# Patient Record
Sex: MALE | Race: WHITE | NOT HISPANIC OR LATINO | Employment: OTHER | URBAN - METROPOLITAN AREA
[De-identification: names, ages, dates, MRNs, and addresses within clinical notes are randomized per-mention and may not be internally consistent; named-entity substitution may affect disease eponyms.]

---

## 2017-05-19 ENCOUNTER — THERAPY VISIT (OUTPATIENT)
Dept: PHYSICAL THERAPY | Facility: CLINIC | Age: 57
End: 2017-05-19
Payer: COMMERCIAL

## 2017-05-19 DIAGNOSIS — M75.102 ROTATOR CUFF TEAR ARTHROPATHY OF BOTH SHOULDERS: ICD-10-CM

## 2017-05-19 DIAGNOSIS — M75.101 ROTATOR CUFF TEAR ARTHROPATHY OF BOTH SHOULDERS: ICD-10-CM

## 2017-05-19 DIAGNOSIS — M12.811 ROTATOR CUFF TEAR ARTHROPATHY OF BOTH SHOULDERS: ICD-10-CM

## 2017-05-19 DIAGNOSIS — M12.812 ROTATOR CUFF TEAR ARTHROPATHY OF BOTH SHOULDERS: ICD-10-CM

## 2017-05-19 DIAGNOSIS — M25.511 BILATERAL SHOULDER PAIN, UNSPECIFIED CHRONICITY: Primary | ICD-10-CM

## 2017-05-19 DIAGNOSIS — M25.512 BILATERAL SHOULDER PAIN, UNSPECIFIED CHRONICITY: Primary | ICD-10-CM

## 2017-05-19 PROCEDURE — 97112 NEUROMUSCULAR REEDUCATION: CPT | Mod: GP | Performed by: PHYSICAL THERAPIST

## 2017-05-19 PROCEDURE — 97161 PT EVAL LOW COMPLEX 20 MIN: CPT | Mod: GP | Performed by: PHYSICAL THERAPIST

## 2017-05-19 NOTE — LETTER
[facility]  [address] [place] [address]  [phone]    May 22, 2017    Re: Yakov Kahn   :   1960  MRN:  [id number]   REFERRING PHYSICIAN:   Ricardo Poe    [facility]  Date of Initial Evaluation: 2107   Visits:  Rxs Used: 1  Reason for Referral:     Bilateral shoulder pain, unspecified chronicity  Rotator cuff tear arthropathy of both shoulders    EVALUATION SUMMARY  Subjective:  Patient is a 56-year-old male presenting with rehab left shoulder hpi.   Yakov Kahn is a 56 year old male with a bilateral shoulders (L>R) condition.  Condition occurred with: Unknown cause.  Condition occurred: for unknown reasons.  This is a new condition  Pain ongoing for years, tore L recently a few weeks ago, R problematic since .    Patient reports pain: In the joint, anterior and lateral (L painful R not).  Radiates to: Upper arm.  Pain is described as sharp and is intermittent and reported as 3/10 and 10/10.  Associated symptoms: Loss of motion/stiffness and edema.  Pain is worse in the PM.  Symptoms are exacerbated by using arm at shoulder level, using arm overhead, certain positions, lifting, carrying and using arm behind back and relieved by ice and NSAIDs.  Since onset symptoms are gradually improving.  Special tests: MRI.  Previous treatment includes other (massage, self prescribed exercsies).  There was significant improvement following previous treatment.  General health as reported by patient is good.  Pertinent medical history includes: None.  Medical allergies: yes.  Other surgeries include:  None reported.  Current medications: None as reported by the patient.  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  Patient is working in normal job without restrictions.  Primary job tasks include: Repetitive tasks, lifting, driving and operating a machine.  Barriers include: None as reported by the patient.  Red flags: None as  reported by the patient.    Objective:  Shoulder Evaluation:  AROM:    Flexion:  Left:  80    Right:  140  Abduction:  Left: 90   Right:  140  Internal Rotation:  Left:  60    Right:  70  External Rotation:  Left:  60    Right:  80  Strength:    Flexion: Left:4-/5   Pain:    Right: 4+/5     Pain:   Abduction:  Left: 4-/5  Pain:    Right: 4+/5     Pain:  Internal Rotation:  Left:4+/5     Pain:    Right: 4+/5     Pain:  External Rotation:   Left:4/5     Pain:   Right:4/5     Pain:    Elbow Flexion:  Left:5/5     Pain:    Right:5/5     Pain:  Elbow Extension:  Left:5/5     Pain:    Right:5/5     Pain:  Stability Testing:  normal  Special Tests:    Left shoulder positive for the following special tests:  Impingement and Rotator cuff tear  Right shoulder positive for the following special tests:Impingement and Rotator cuff tear            Re: Yakov Kahn   :   1960      Assessment/Plan:    Patient is a 56-year-old male with both sides shoulder complaints.    Patient has the following significant findings with corresponding treatment plan.                Diagnosis 1:  B RCT  Pain -  hot/cold therapy, manual therapy, education, directional preference exercise and home program  Decreased ROM/flexibility - manual therapy, therapeutic exercise and home program  Decreased strength - therapeutic exercise, therapeutic activities and home program  Impaired muscle performance - neuro re-education and home program  Decreased function - therapeutic activities and home program    Therapy Evaluation Codes:   1) History comprised of:   Personal factors that impact the plan of care:  Profession.    Comorbidity factors that impact the plan of care are:  Weakness.     Medications impacting care: Anti-inflammatory.  2) Examination of Body Systems comprised of:   Body structures and functions that impact the plan of care:  Shoulder.   Activity limitations that impact the plan of care are:     Bathing, Cooking, Driving, Dressing,  Lifting, Sports, Throwing, Working and Sleeping.  3) Clinical presentation characteristics are: Stable/Uncomplicated.  4) Decision-Making:  Low complexity using standardized patient assessment instrument and/or measureable assessment of functional outcome.  Cumulative Therapy Evaluation is: Low complexity.    Previous and current functional limitations: (See Goal Flow Sheet for this information)    Short term and Long term goals: (See Goal Flow Sheet for this information)   Communication ability: Patient appears to be able to clearly communicate and understand verbal and written communication and follow directions correctly.  Treatment Explanation - The following has been discussed with the patient: RX ordered/plan of care  Anticipated outcomes  Possible risks and side effects  This patient would benefit from PT intervention to resume normal activities.   Rehab potential is fair due to presence of B RCT confirmed on MRI.  Frequency:  1 X week, once daily  Duration:  for 4 weeks tapering to 2 X a month over 8 weeks  Discharge Plan:  Achieve all LTG.  Independent in home treatment program.  Reach maximal therapeutic benefit.         Thank you for your referral.    INQUIRIES  Therapist: Swathi Yang [facility]  [facility]  [address] [place] [address]  Phone: [phone]/Fax: [phone]

## 2017-05-19 NOTE — PROGRESS NOTES
Subjective:    Patient is a 56 year old male presenting with rehab left shoulder hpi.   Yakov Kahn is a 56 year old male with a bilateral shoulders (L>R) condition.  Condition occurred with:  Unknown cause.  Condition occurred: for unknown reasons.  This is a new condition  Pain ongoing for years, tore L recently a few weeks ago, R problematic since 2013.    Patient reports pain:  In the joint, anterior and lateral (L painful R not).  Radiates to:  Upper arm.  Pain is described as sharp and is intermittent and reported as 3/10 and 10/10.  Associated symptoms:  Loss of motion/stiffness and edema. Pain is worse in the P.M..  Symptoms are exacerbated by using arm at shoulder level, using arm overhead, certain positions, lifting, carrying and using arm behind back and relieved by ice and NSAID's.  Since onset symptoms are gradually improving.  Special tests:  MRI.  Previous treatment includes other (massage, self prescribed exercsies).  There was significant improvement following previous treatment.  General health as reported by patient is good.  Pertinent medical history includes:  None.  Medical allergies: yes.  Other surgeries include:  None reported.  Current medications:  None as reported by the patient.  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  Patient is working in normal job without restrictions.  Primary job tasks include:  Repetitive tasks, lifting, driving and operating a machine.    Barriers include:  None as reported by the patient.    Red flags:  None as reported by the patient.                        Objective:    System                   Shoulder Evaluation:  ROM:  AROM:    Flexion:  Left:  80    Right:  140    Abduction:  Left: 90   Right:  140    Internal Rotation:  Left:  60    Right:  70  External Rotation:  Left:  60    Right:  80                      Strength:    Flexion: Left:4-/5   Pain:    Right: 4+/5     Pain:     Abduction:  Left: 4-/5  Pain:    Right: 4+/5      Pain:    Internal Rotation:  Left:4+/5     Pain:    Right: 4+/5     Pain:  External Rotation:   Left:4/5     Pain:   Right:4/5     Pain:        Elbow Flexion:  Left:5/5     Pain:    Right:5/5     Pain:  Elbow Extension:  Left:5/5     Pain:    Right:5/5     Pain:  Stability Testing:  normal      Special Tests:    Left shoulder positive for the following special tests:  Impingement and Rotator cuff tear  Right shoulder positive for the following special tests:Impingement and Rotator cuff tear                                       General     ROS    Assessment/Plan:      Patient is a 56 year old male with both sides shoulder complaints.    Patient has the following significant findings with corresponding treatment plan.                Diagnosis 1:  B RCT  Pain -  hot/cold therapy, manual therapy, education, directional preference exercise and home program  Decreased ROM/flexibility - manual therapy, therapeutic exercise and home program  Decreased strength - therapeutic exercise, therapeutic activities and home program  Impaired muscle performance - neuro re-education and home program  Decreased function - therapeutic activities and home program    Therapy Evaluation Codes:   1) History comprised of:   Personal factors that impact the plan of care:      Profession.    Comorbidity factors that impact the plan of care are:      Weakness.     Medications impacting care: Anti-inflammatory.  2) Examination of Body Systems comprised of:   Body structures and functions that impact the plan of care:      Shoulder.   Activity limitations that impact the plan of care are:      Bathing, Cooking, Driving, Dressing, Lifting, Sports, Throwing, Working and Sleeping.  3) Clinical presentation characteristics are:   Stable/Uncomplicated.  4) Decision-Making    Low complexity using standardized patient assessment instrument and/or measureable assessment of functional outcome.  Cumulative Therapy Evaluation is: Low  complexity.    Previous and current functional limitations:  (See Goal Flow Sheet for this information)    Short term and Long term goals: (See Goal Flow Sheet for this information)     Communication ability:  Patient appears to be able to clearly communicate and understand verbal and written communication and follow directions correctly.  Treatment Explanation - The following has been discussed with the patient:   RX ordered/plan of care  Anticipated outcomes  Possible risks and side effects  This patient would benefit from PT intervention to resume normal activities.   Rehab potential is fair due to presence of B RCT confirmed on MRI.    Frequency:  1 X week, once daily  Duration:  for 4 weeks tapering to 2 X a month over 8 weeks  Discharge Plan:  Achieve all LTG.  Independent in home treatment program.  Reach maximal therapeutic benefit.    Please refer to the daily flowsheet for treatment today, total treatment time and time spent performing 1:1 timed codes.

## 2017-05-19 NOTE — MR AVS SNAPSHOT
After Visit Summary   5/19/2017    Yakov Kahn    MRN: [id number]           Patient Information     Date Of Birth          1960        Visit Information        Provider Department      5/19/2017 9:00 AM Swathi Yang, PT [facility]        Today's Diagnoses     Bilateral shoulder pain, unspecified chronicity    -  1    Rotator cuff tear arthropathy of both shoulders           Follow-ups after your visit        Your next 10 appointments already scheduled     May 25, 2017  8:00 AM CDT   LINDA Extremity with Swathi Yang PT   [facility] (LINDA [place])    [address]  [place] [address]   [phone]            Jun 02, 2017  7:00 AM CDT   LINDA Extremity with Swathi Yang PT   [facility] ([facility] [place])    [address]  [place] [address]   [phone]            Jun 09, 2017  7:00 AM CDT   LINDA Extremity with Swathi Yang PT   [facility] ([facility] [place])    [address]  [place] [address]   [phone]              Who to contact     If you have questions or need follow up information about today's clinic visit or your schedule please contact [facility] directly at [phone].  Normal or non-critical lab and imaging results will be communicated to you by MyChart, letter or phone within 4 business days after the clinic has received the results. If you do not hear from us within 7 days, please contact the clinic through MyChart or phone. If you have a critical or abnormal lab result, we will notify you by phone as soon as possible.  Submit refill requests through [email or web address] or call your pharmacy and they will forward the refill request to us. Please allow 3 business days for your refill to be completed.          Additional Information About Your Visit        Care EveryWhere ID     This is your Care  EveryWhere ID. This could be used by other organizations to access your [facility] medical records  [id number]         Blood Pressure from Last 3 Encounters:   04/07/15 140/82   09/17/04 140/78    Weight from Last 3 Encounters:   04/07/15 95.7 kg (210 lb 14.4 oz)   09/17/04 87.5 kg (193 lb)              We Performed the Following     HC PT EVAL, LOW COMPLEXITY     LINDA INITIAL EVAL REPORT     NEUROMUSCULAR RE-EDUCATION        Primary Care Provider Office Phone # Fax #    Humberto Vora PA-C [phone]       [facility] [address]  [facility] [address]        Thank you!     Thank you for choosing [facility]  for your care. Our goal is always to provide you with excellent care. Hearing back from our patients is one way we can continue to improve our services. Please take a few minutes to complete the written survey that you may receive in the mail after your visit with us. Thank you!             Your Updated Medication List - Protect others around you: Learn how to safely use, store and throw away your medicines at www.disposemymeds.org.          This list is accurate as of: 5/19/17  2:34 PM.  Always use your most recent med list.                   Brand Name Dispense Instructions for use    CALCIUM-D PO          NAPROSYN 500 MG tablet   Generic drug:  naproxen     60    1 TABLET TWICE DAILY       triamcinolone 0.1 % lotion    KENALOG    60 mL    Apply sparingly to affected area three times daily as needed.       vitamin D 1000 UNITS capsule      Take 2 capsules by mouth daily       ZYRTEC ALLERGY 10 MG Caps   Generic drug:  cetirizine HCl      Take by mouth daily

## 2017-05-25 ENCOUNTER — THERAPY VISIT (OUTPATIENT)
Dept: PHYSICAL THERAPY | Facility: CLINIC | Age: 57
End: 2017-05-25
Payer: COMMERCIAL

## 2017-05-25 DIAGNOSIS — M25.511 BILATERAL SHOULDER PAIN, UNSPECIFIED CHRONICITY: ICD-10-CM

## 2017-05-25 DIAGNOSIS — M25.512 BILATERAL SHOULDER PAIN, UNSPECIFIED CHRONICITY: ICD-10-CM

## 2017-05-25 DIAGNOSIS — M12.811 ROTATOR CUFF TEAR ARTHROPATHY OF BOTH SHOULDERS: ICD-10-CM

## 2017-05-25 DIAGNOSIS — M12.812 ROTATOR CUFF TEAR ARTHROPATHY OF BOTH SHOULDERS: ICD-10-CM

## 2017-05-25 DIAGNOSIS — M75.102 ROTATOR CUFF TEAR ARTHROPATHY OF BOTH SHOULDERS: ICD-10-CM

## 2017-05-25 DIAGNOSIS — M75.101 ROTATOR CUFF TEAR ARTHROPATHY OF BOTH SHOULDERS: ICD-10-CM

## 2017-05-25 PROCEDURE — 97110 THERAPEUTIC EXERCISES: CPT | Mod: GP | Performed by: PHYSICAL THERAPIST

## 2017-05-25 PROCEDURE — 97112 NEUROMUSCULAR REEDUCATION: CPT | Mod: GP | Performed by: PHYSICAL THERAPIST

## 2017-06-02 ENCOUNTER — THERAPY VISIT (OUTPATIENT)
Dept: PHYSICAL THERAPY | Facility: CLINIC | Age: 57
End: 2017-06-02
Payer: COMMERCIAL

## 2017-06-02 DIAGNOSIS — M12.812 ROTATOR CUFF TEAR ARTHROPATHY OF BOTH SHOULDERS: ICD-10-CM

## 2017-06-02 DIAGNOSIS — M12.811 ROTATOR CUFF TEAR ARTHROPATHY OF BOTH SHOULDERS: ICD-10-CM

## 2017-06-02 DIAGNOSIS — M75.102 ROTATOR CUFF TEAR ARTHROPATHY OF BOTH SHOULDERS: ICD-10-CM

## 2017-06-02 DIAGNOSIS — M25.512 BILATERAL SHOULDER PAIN, UNSPECIFIED CHRONICITY: ICD-10-CM

## 2017-06-02 DIAGNOSIS — M25.511 BILATERAL SHOULDER PAIN, UNSPECIFIED CHRONICITY: ICD-10-CM

## 2017-06-02 DIAGNOSIS — M75.101 ROTATOR CUFF TEAR ARTHROPATHY OF BOTH SHOULDERS: ICD-10-CM

## 2017-06-02 PROCEDURE — 97110 THERAPEUTIC EXERCISES: CPT | Mod: GP | Performed by: PHYSICAL THERAPIST

## 2017-06-02 PROCEDURE — 97112 NEUROMUSCULAR REEDUCATION: CPT | Mod: GP | Performed by: PHYSICAL THERAPIST

## 2017-06-30 ENCOUNTER — THERAPY VISIT (OUTPATIENT)
Dept: PHYSICAL THERAPY | Facility: CLINIC | Age: 57
End: 2017-06-30
Payer: COMMERCIAL

## 2017-06-30 DIAGNOSIS — M12.812 ROTATOR CUFF TEAR ARTHROPATHY OF BOTH SHOULDERS: ICD-10-CM

## 2017-06-30 DIAGNOSIS — M75.101 ROTATOR CUFF TEAR ARTHROPATHY OF BOTH SHOULDERS: ICD-10-CM

## 2017-06-30 DIAGNOSIS — M25.512 BILATERAL SHOULDER PAIN, UNSPECIFIED CHRONICITY: ICD-10-CM

## 2017-06-30 DIAGNOSIS — M12.811 ROTATOR CUFF TEAR ARTHROPATHY OF BOTH SHOULDERS: ICD-10-CM

## 2017-06-30 DIAGNOSIS — M75.102 ROTATOR CUFF TEAR ARTHROPATHY OF BOTH SHOULDERS: ICD-10-CM

## 2017-06-30 DIAGNOSIS — M25.511 BILATERAL SHOULDER PAIN, UNSPECIFIED CHRONICITY: ICD-10-CM

## 2017-06-30 PROCEDURE — 97112 NEUROMUSCULAR REEDUCATION: CPT | Mod: GP | Performed by: PHYSICAL THERAPIST

## 2017-06-30 PROCEDURE — 97110 THERAPEUTIC EXERCISES: CPT | Mod: GP | Performed by: PHYSICAL THERAPIST

## 2017-07-28 ENCOUNTER — OFFICE VISIT (OUTPATIENT)
Dept: FAMILY MEDICINE | Facility: CLINIC | Age: 57
End: 2017-07-28
Payer: COMMERCIAL

## 2017-07-28 ENCOUNTER — THERAPY VISIT (OUTPATIENT)
Dept: PHYSICAL THERAPY | Facility: CLINIC | Age: 57
End: 2017-07-28
Payer: COMMERCIAL

## 2017-07-28 VITALS
HEART RATE: 69 BPM | HEIGHT: 68 IN | WEIGHT: 204.6 LBS | BODY MASS INDEX: 31.01 KG/M2 | TEMPERATURE: 96.9 F | SYSTOLIC BLOOD PRESSURE: 136 MMHG | DIASTOLIC BLOOD PRESSURE: 87 MMHG

## 2017-07-28 DIAGNOSIS — Z11.59 NEED FOR HEPATITIS C SCREENING TEST: ICD-10-CM

## 2017-07-28 DIAGNOSIS — M12.811 ROTATOR CUFF TEAR ARTHROPATHY OF BOTH SHOULDERS: ICD-10-CM

## 2017-07-28 DIAGNOSIS — M25.511 BILATERAL SHOULDER PAIN, UNSPECIFIED CHRONICITY: ICD-10-CM

## 2017-07-28 DIAGNOSIS — R03.0 ELEVATED BLOOD PRESSURE READING WITHOUT DIAGNOSIS OF HYPERTENSION: Primary | ICD-10-CM

## 2017-07-28 DIAGNOSIS — M75.102 ROTATOR CUFF TEAR ARTHROPATHY OF BOTH SHOULDERS: ICD-10-CM

## 2017-07-28 DIAGNOSIS — Z13.6 CARDIOVASCULAR SCREENING; LDL GOAL LESS THAN 160: ICD-10-CM

## 2017-07-28 DIAGNOSIS — M75.101 ROTATOR CUFF TEAR ARTHROPATHY OF BOTH SHOULDERS: ICD-10-CM

## 2017-07-28 DIAGNOSIS — M25.512 BILATERAL SHOULDER PAIN, UNSPECIFIED CHRONICITY: ICD-10-CM

## 2017-07-28 DIAGNOSIS — Z12.11 SPECIAL SCREENING FOR MALIGNANT NEOPLASMS, COLON: ICD-10-CM

## 2017-07-28 DIAGNOSIS — M12.812 ROTATOR CUFF TEAR ARTHROPATHY OF BOTH SHOULDERS: ICD-10-CM

## 2017-07-28 LAB
ANION GAP SERPL CALCULATED.3IONS-SCNC: 11 MMOL/L (ref 3–14)
BUN SERPL-MCNC: 12 MG/DL (ref 7–30)
CALCIUM SERPL-MCNC: 9.2 MG/DL (ref 8.5–10.1)
CHLORIDE SERPL-SCNC: 107 MMOL/L (ref 94–109)
CHOLEST SERPL-MCNC: 262 MG/DL
CO2 SERPL-SCNC: 22 MMOL/L (ref 20–32)
CREAT SERPL-MCNC: 0.84 MG/DL (ref 0.66–1.25)
GFR SERPL CREATININE-BSD FRML MDRD: ABNORMAL ML/MIN/1.7M2
GLUCOSE SERPL-MCNC: 118 MG/DL (ref 70–99)
HCV AB SERPL QL IA: NORMAL
HDLC SERPL-MCNC: 99 MG/DL
LDLC SERPL CALC-MCNC: 138 MG/DL
NONHDLC SERPL-MCNC: 163 MG/DL
POTASSIUM SERPL-SCNC: 3.8 MMOL/L (ref 3.4–5.3)
SODIUM SERPL-SCNC: 140 MMOL/L (ref 133–144)
TRIGL SERPL-MCNC: 123 MG/DL

## 2017-07-28 PROCEDURE — 80048 BASIC METABOLIC PNL TOTAL CA: CPT | Performed by: PHYSICIAN ASSISTANT

## 2017-07-28 PROCEDURE — 97110 THERAPEUTIC EXERCISES: CPT | Mod: GP | Performed by: PHYSICAL THERAPIST

## 2017-07-28 PROCEDURE — 97112 NEUROMUSCULAR REEDUCATION: CPT | Mod: GP | Performed by: PHYSICAL THERAPIST

## 2017-07-28 PROCEDURE — 36415 COLL VENOUS BLD VENIPUNCTURE: CPT | Performed by: PHYSICIAN ASSISTANT

## 2017-07-28 PROCEDURE — 82274 ASSAY TEST FOR BLOOD FECAL: CPT | Performed by: PHYSICIAN ASSISTANT

## 2017-07-28 PROCEDURE — 80061 LIPID PANEL: CPT | Performed by: PHYSICIAN ASSISTANT

## 2017-07-28 PROCEDURE — 99213 OFFICE O/P EST LOW 20 MIN: CPT | Performed by: PHYSICIAN ASSISTANT

## 2017-07-28 PROCEDURE — 86803 HEPATITIS C AB TEST: CPT | Performed by: PHYSICIAN ASSISTANT

## 2017-07-28 NOTE — MR AVS SNAPSHOT
After Visit Summary   7/28/2017    Yakov Kahn    MRN: [id number]           Patient Information     Date Of Birth          1960        Visit Information        Provider Department      7/28/2017 7:50 AM Indigo Cutler PA-C [facility]        Today's Diagnoses     Elevated blood pressure reading without diagnosis of hypertension    -  1    CARDIOVASCULAR SCREENING; LDL GOAL LESS THAN 160        Need for hepatitis C screening test        Special screening for malignant neoplasms, colon           Follow-ups after your visit        Your next 10 appointments already scheduled     Aug 28, 2017  7:10 AM CDT   LINDA Extremity with Swathi Yang PT   [facility] [place] [facility] ([facility])    [address]  [place] [address] [phone]              Future tests that were ordered for you today     Open Future Orders        Priority Expected Expires Ordered    Fecal colorectal cancer screen FIT Routine 8/18/2017 10/20/2017 7/28/2017            Who to contact     If you have questions or need follow up information about today's clinic visit or your schedule please contact [facility] directly at [phone].  Normal or non-critical lab and imaging results will be communicated to you by MyChart, letter or phone within 4 business days after the clinic has received the results. If you do not hear from us within 7 days, please contact the clinic through MyChart or phone. If you have a critical or abnormal lab result, we will notify you by phone as soon as possible.  Submit refill requests through [employer] or call your pharmacy and they will forward the refill request to us. Please allow 3 business days for your refill to be completed.          Additional Information About Your Visit        Care EveryWhere ID     This is your Care EveryWhere ID. This could be used by other organizations to access your [facility]  "records  [id number]        Your Vitals Were     Pulse Temperature Height BMI (Body Mass Index)          69 96.9  F (36.1  C) (Tympanic) 5' 7.5\" (1.715 m) 31.57 kg/m2         Blood Pressure from Last 3 Encounters:   07/28/17 136/87   04/07/15 140/82   09/17/04 140/78    Weight from Last 3 Encounters:   07/28/17 204 lb 9.6 oz (92.8 kg)   04/07/15 210 lb 14.4 oz (95.7 kg)   09/17/04 193 lb (87.5 kg)              We Performed the Following     Basic metabolic panel     Hepatitis C Screen Reflex to HCV RNA Quant and Genotype     Lipid panel reflex to direct LDL        Primary Care Provider Office Phone # Fax #    Humberto Vora PA-C [phone]       [facility] [address] [facility] [address]        Equal Access to Services     KARISSA ARIZA AH: Hadii aad ku hadasho Soomaali, waaxda luqadaha, qaybta kaalmada adeegyada, waxay idiin hayaan peytoneg kharash laekaterinan . So [facility] [phone].    ATENCIÓN: Si stacey chambers, tiene a parrish disposición servicios gratuitos de asistencia lingüística. Llame al [phone].    We comply with applicable federal civil rights laws and [place] laws. We do not discriminate on the basis of race, color, national origin, age, disability sex, sexual orientation or gender identity.            Thank you!     Thank you for choosing [facility]  for your care. Our goal is always to provide you with excellent care. Hearing back from our patients is one way we can continue to improve our services. Please take a few minutes to complete the written survey that you may receive in the mail after your visit with us. Thank you!             Your Updated Medication List - Protect others around you: Learn how to safely use, store and throw away your medicines at www.disposemymeds.org.          This list is accurate as of: 7/28/17  7:57 AM.  Always use your most recent med list.                   Brand Name Dispense Instructions for use Diagnosis    ZYRTEC " ALLERGY 10 MG Caps   Generic drug:  cetirizine HCl      Take by mouth daily

## 2017-07-28 NOTE — PROGRESS NOTES
"  SUBJECTIVE:                                                    Yakov Kahn is a 56 year old male who presents to clinic today for the following health issues    Hypertension Follow-up      Outpatient blood pressures are not being checked.    Low Salt Diet: low salt    Amount of exercise or physical activity: work only    Problems taking medications regularly: na    Medication side effects: not applicable  Diet: regular (no restrictions)      Patient is here today for BP recheck  Was getting MRI done, and noted to have high blood pressure  No chest pain, shortness of breath, leg swelling, headache or vision changes  Admits to low salt diet, non smoker, + family history of HTN    Problem list and histories reviewed & adjusted, as indicated.  Additional history: as documented    Patient Active Problem List   Diagnosis     Plantar fascial fibromatosis     CARDIOVASCULAR SCREENING; LDL GOAL LESS THAN 160     Bilateral shoulder pain, unspecified chronicity     Rotator cuff tear arthropathy of both shoulders     History reviewed. No pertinent surgical history.    Social History   Substance Use Topics     Smoking status: Never Smoker     Smokeless tobacco: Former User     Alcohol use Yes     Family History   Problem Relation Age of Onset     Hypertension Brother      DIABETES No family hx of      Other Cancer No family hx of          Current Outpatient Prescriptions   Medication Sig Dispense Refill     cetirizine HCl (ZYRTEC ALLERGY) 10 MG CAPS Take by mouth daily       No Known Allergies      Reviewed and updated as needed this visit by clinical staff     Reviewed and updated as needed this visit by Provider         ROS:  Constitutional, HEENT, cardiovascular, pulmonary, gi and gu systems are negative, except as otherwise noted.      OBJECTIVE:   /87  Pulse 69  Temp 96.9  F (36.1  C) (Tympanic)  Ht 5' 7.5\" (1.715 m)  Wt 204 lb 9.6 oz (92.8 kg)  BMI 31.57 kg/m2  Body mass index is 31.57 kg/(m^2).  GENERAL: " healthy, alert and no distress  NECK: no adenopathy, no asymmetry, masses, or scars and thyroid normal to palpation  RESP: lungs clear to auscultation - no rales, rhonchi or wheezes  CV: regular rate and rhythm, normal S1 S2, no S3 or S4, no murmur, click or rub, no peripheral edema and peripheral pulses strong  MS: no gross musculoskeletal defects noted, no edema    Diagnostic Test Results:  none     ASSESSMENT/PLAN:             1. Elevated blood pressure reading without diagnosis of hypertension  Chronic issue, reviewed BP today as high-normal.  Will check BMP today to ensure kidney function okay, suspect may need BP meds in future.  Recommend DASH diet, exercise and weight loss.  Will check BP at a few nurse visit and if higher than 140/90, needs appt.  - Basic metabolic panel    2. CARDIOVASCULAR SCREENING; LDL GOAL LESS THAN 160  - Lipid panel reflex to direct LDL    3. Need for hepatitis C screening test  - Hepatitis C Screen Reflex to HCV RNA Quant and Genotype    4. Special screening for malignant neoplasms, colon  - Fecal colorectal cancer screen FIT; Future    Risks, benefits and alternatives were discussed with patient. Agreeable to the plan of care.      Indigo Cutler PA-C  [facility]

## 2017-07-28 NOTE — MR AVS SNAPSHOT
After Visit Summary   7/28/2017    Yakov Kahn    MRN: [id number]           Patient Information     Date Of Birth          1960        Visit Information        Provider Department      7/28/2017 7:00 AM Swathi Yang PT [facility]        Today's Diagnoses     Bilateral shoulder pain, unspecified chronicity        Rotator cuff tear arthropathy of both shoulders           Follow-ups after your visit        Your next 10 appointments already scheduled     Jul 28, 2017  7:50 AM CDT   SHORT with Indigo Cutler PA-C   [facility]    [address] [address] [phone]            Aug 28, 2017  7:10 AM CDT   LINDA Extremity with Swathi Yang PT   [facility] ([facility])    [address] [address] [phone]              Who to contact     If you have questions or need follow up information about today's clinic visit or your schedule please contact [facility] directly at [phone].  Normal or non-critical lab and imaging results will be communicated to you by MyChart, letter or phone within 4 business days after the clinic has received the results. If you do not hear from us within 7 days, please contact the clinic through MyChart or phone. If you have a critical or abnormal lab result, we will notify you by phone as soon as possible.  Submit refill requests through SideTourt or call your pharmacy and they will forward the refill request to us. Please allow 3 business days for your refill to be completed.          Additional Information About Your Visit        Care EveryWhere ID     This is your Care EveryWhere ID. This could be used by other organizations to access your [facility] medical records  [id number]         Blood Pressure from Last 3 Encounters:   04/07/15 140/82   09/17/04 140/78     Weight from Last 3 Encounters:   04/07/15 95.7 kg (210 lb 14.4 oz)   09/17/04 87.5 kg (193 lb)              We Performed the Following     NEUROMUSCULAR RE-EDUCATION     THERAPEUTIC EXERCISES        Primary Care Provider Office Phone # Fax #    Humberto Vora PA-C [phone]       [facility] [address] [address] [facility] [address]        Equal Access to Services     KARISSA ARIZA : Hadii aad ku hadasho Soomaali, waaxda luqadaha, qaybta kaalmada adeegyada, waxay idiin hayaan adeeg kharash la'aan . So [facility] [phone].    ATENCIÓN: Si habla espyelena, tiene a parrish disposición servicios gratuitos de asistencia lingüística. Llame al [phone].    We comply with applicable federal civil rights laws and [place] laws. We do not discriminate on the basis of race, color, national origin, age, disability sex, sexual orientation or gender identity.            Thank you!     Thank you for choosing [facility]  for your care. Our goal is always to provide you with excellent care. Hearing back from our patients is one way we can continue to improve our services. Please take a few minutes to complete the written survey that you may receive in the mail after your visit with us. Thank you!             Your Updated Medication List - Protect others around you: Learn how to safely use, store and throw away your medicines at www.disposemymeds.org.          This list is accurate as of: 7/28/17  7:48 AM.  Always use your most recent med list.                   Brand Name Dispense Instructions for use Diagnosis    ZYRTEC ALLERGY 10 MG Caps   Generic drug:  cetirizine HCl      Take by mouth daily

## 2017-07-28 NOTE — LETTER
"[facility]  [address] [address] [phone]          July 31, 2017    Yakov Kahn                                                                                                                     [facility] [place] [address]            Dear Yakov,    Total cholesterol is high at 262, please continue exercise and watch diet.  Triglycerides are normal at 123, this is simple sugar and fat in blood. HDL which is the \"good\" cholesterol (heart protective)  is good at 138. Increase this with more exercise.  The LDL or \"bad\" cholesterol is at 138. Would recommend 20mg Lipitor daily with lab recheck in 3 months, see if patient agreeable and if so will order medication and labs.  Your CMP reveals normal kidney function, liver function and electrolytes. Your sugar level was high at 118, would recommend watching diet and exercise, consider adding HGA1C next lab draw.  Your Hepatitis C testing was negative.    Sincerely,         Indigo Cutler PA-C    "

## 2017-08-03 ENCOUNTER — ALLIED HEALTH/NURSE VISIT (OUTPATIENT)
Dept: FAMILY MEDICINE | Facility: CLINIC | Age: 57
End: 2017-08-03
Payer: COMMERCIAL

## 2017-08-03 VITALS — SYSTOLIC BLOOD PRESSURE: 148 MMHG | DIASTOLIC BLOOD PRESSURE: 84 MMHG

## 2017-08-03 DIAGNOSIS — Z01.30 BP CHECK: Primary | ICD-10-CM

## 2017-08-03 PROCEDURE — 99207 ZZC NO CHARGE NURSE ONLY: CPT | Performed by: PHYSICIAN ASSISTANT

## 2017-08-03 NOTE — PROGRESS NOTES
Yakov Kahn is enrolled/participating in the retail pharmacy Blood Pressure Goals Achievement Program (BPGAP).  Yakov Kahn was evaluated at [facility] on August 3, 2017 at which time his blood pressure was:    BP Readings from Last 3 Encounters:   08/03/17 148/84   07/28/17 136/87   04/07/15 140/82     Reviewed lifestyle modifications for blood pressure control and reduction: including making healthy food choices, managing weight, getting regular exercise, smoking cessation, reducing alcohol consumption, monitoring blood pressure regularly.     Yakov Kahn is not experiencing symptoms.    Follow-Up: BP is at goal of < 140/90mmHg (patient 18+ years of age with or without diabetes).  Recommended follow-up at pharmacy in 6 months.     Recommendation to Provider: follow up blood pressure within 30 days    Yakov Kahn was evaluated for enrollment into the PGEN study today.    Patient eligible for enrollment:  No  Patient interested in enrollment:  No    Completed by: Guicho Fong      Thank You   Guicho Fong RPh [facility]

## 2017-08-03 NOTE — MR AVS SNAPSHOT
After Visit Summary   8/3/2017    Yakov Kahn    MRN: [id number]           Patient Information     Date Of Birth          1960        Visit Information        Provider Department      8/3/2017 11:22 AM Humberto Vora PA-C [facility]        Today's Diagnoses     BP check    -  1       Follow-ups after your visit        Your next 10 appointments already scheduled     Aug 28, 2017  7:10 AM CDT   LINDA Extremity with Swathi Yang PT   [facility] [place] [facility] ([facility] [place])    [address]  [place] [address] [phone]              Who to contact     If you have questions or need follow up information about today's clinic visit or your schedule please contact [facility] directly at [phone].  Normal or non-critical lab and imaging results will be communicated to you by MyChart, letter or phone within 4 business days after the clinic has received the results. If you do not hear from us within 7 days, please contact the clinic through MyChart or phone. If you have a critical or abnormal lab result, we will notify you by phone as soon as possible.  Submit refill requests through SpiceCSM or call your pharmacy and they will forward the refill request to us. Please allow 3 business days for your refill to be completed.          Additional Information About Your Visit        Care EveryWhere ID     This is your Care EveryWhere ID. This could be used by other organizations to access your [facility] medical records  [id number]         Blood Pressure from Last 3 Encounters:   08/03/17 148/84   07/28/17 136/87   04/07/15 140/82    Weight from Last 3 Encounters:   07/28/17 204 lb 9.6 oz (92.8 kg)   04/07/15 210 lb 14.4 oz (95.7 kg)   09/17/04 193 lb (87.5 kg)              Today, you had the following     No orders found for display       Primary Care Provider Office Phone # Fax #    Humberto Vora PA-C [phone]  [phone]       [facility] [address]  [facility] [address]        Equal Access to Services     NAVYALORA ANNITA : Hadii aad ku hadbrieo Socemali, waaxda luqadaha, qaybta kaalmada adekofiyada, bernadine maudein hayaasofi bethany clemons laFartunrula mahendra. So [facility] [phone].    ATENCIÓN: Si habla español, tiene a parrish disposición servicios gratuitos de asistencia lingüística. Llame al [phone].    We comply with applicable federal civil rights laws and [place] laws. We do not discriminate on the basis of race, color, national origin, age, disability sex, sexual orientation or gender identity.            Thank you!     Thank you for choosing [facility]  for your care. Our goal is always to provide you with excellent care. Hearing back from our patients is one way we can continue to improve our services. Please take a few minutes to complete the written survey that you may receive in the mail after your visit with us. Thank you!             Your Updated Medication List - Protect others around you: Learn how to safely use, store and throw away your medicines at www.disposemymeds.org.          This list is accurate as of: 8/3/17 11:25 AM.  Always use your most recent med list.                   Brand Name Dispense Instructions for use Diagnosis    ZYRTEC ALLERGY 10 MG Caps   Generic drug:  cetirizine HCl      Take by mouth daily

## 2017-08-04 LAB — HEMOCCULT STL QL IA: NEGATIVE

## 2017-11-07 ENCOUNTER — OFFICE VISIT (OUTPATIENT)
Dept: FAMILY MEDICINE | Facility: CLINIC | Age: 57
End: 2017-11-07
Payer: COMMERCIAL

## 2017-11-07 VITALS
BODY MASS INDEX: 31.39 KG/M2 | HEART RATE: 80 BPM | DIASTOLIC BLOOD PRESSURE: 90 MMHG | HEIGHT: 68 IN | OXYGEN SATURATION: 95 % | WEIGHT: 207.1 LBS | SYSTOLIC BLOOD PRESSURE: 148 MMHG | TEMPERATURE: 97.6 F | RESPIRATION RATE: 16 BRPM

## 2017-11-07 DIAGNOSIS — M12.812 ROTATOR CUFF TEAR ARTHROPATHY OF BOTH SHOULDERS: ICD-10-CM

## 2017-11-07 DIAGNOSIS — M75.101 ROTATOR CUFF TEAR ARTHROPATHY OF BOTH SHOULDERS: ICD-10-CM

## 2017-11-07 DIAGNOSIS — M75.102 ROTATOR CUFF TEAR ARTHROPATHY OF BOTH SHOULDERS: ICD-10-CM

## 2017-11-07 DIAGNOSIS — Z01.818 PREOP GENERAL PHYSICAL EXAM: Primary | ICD-10-CM

## 2017-11-07 DIAGNOSIS — I10 ESSENTIAL HYPERTENSION: ICD-10-CM

## 2017-11-07 DIAGNOSIS — M12.811 ROTATOR CUFF TEAR ARTHROPATHY OF BOTH SHOULDERS: ICD-10-CM

## 2017-11-07 LAB — HGB BLD-MCNC: 16.5 G/DL (ref 13.3–17.7)

## 2017-11-07 PROCEDURE — 36415 COLL VENOUS BLD VENIPUNCTURE: CPT | Performed by: PHYSICIAN ASSISTANT

## 2017-11-07 PROCEDURE — 85018 HEMOGLOBIN: CPT | Performed by: PHYSICIAN ASSISTANT

## 2017-11-07 PROCEDURE — 80048 BASIC METABOLIC PNL TOTAL CA: CPT | Performed by: PHYSICIAN ASSISTANT

## 2017-11-07 PROCEDURE — 99215 OFFICE O/P EST HI 40 MIN: CPT | Performed by: PHYSICIAN ASSISTANT

## 2017-11-07 PROCEDURE — 93000 ELECTROCARDIOGRAM COMPLETE: CPT | Performed by: PHYSICIAN ASSISTANT

## 2017-11-07 RX ORDER — LISINOPRIL 10 MG/1
10 TABLET ORAL DAILY
Qty: 90 TABLET | Refills: 1 | Status: SHIPPED | OUTPATIENT
Start: 2017-11-07 | End: 2018-02-08

## 2017-11-07 NOTE — MR AVS SNAPSHOT
After Visit Summary   11/7/2017    Yakov Kahn    MRN: [id number]           Patient Information     Date Of Birth          1960        Visit Information        Provider Department      11/7/2017 9:00 AM Humberto Vora PA-C [facility]        Today's Diagnoses     Preop general physical exam    -  1    Rotator cuff tear arthropathy of both shoulders        Essential hypertension          Care Instructions    On the morning of your surgery do NOT take your blood pressure medication      Before Your Surgery      Call your surgeon if there is any change in your health. This includes signs of a cold or flu (such as a sore throat, runny nose, cough, rash or fever).    Do not smoke, drink alcohol or take over the counter medicine (unless your surgeon or primary care doctor tells you to) for the 24 hours before and after surgery.    If you take prescribed drugs: Follow your doctor s orders about which medicines to take and which to stop until after surgery.    Eating and drinking prior to surgery: follow the instructions from your surgeon    Take a shower or bath the night before surgery. Use the soap your surgeon gave you to gently clean your skin. If you do not have soap from your surgeon, use your regular soap. Do not shave or scrub the surgery site.  Wear clean pajamas and have clean sheets on your bed.           Follow-ups after your visit        Who to contact     If you have questions or need follow up information about today's clinic visit or your schedule please contact [facility] directly at [phone].  Normal or non-critical lab and imaging results will be communicated to you by MyChart, letter or phone within 4 business days after the clinic has received the results. If you do not hear from us within 7 days, please contact the clinic through MyChart or phone. If you have a critical or abnormal lab result, we will notify you by phone as soon as  "possible.  Submit refill requests through Kopjra or call your pharmacy and they will forward the refill request to us. Please allow 3 business days for your refill to be completed.          Additional Information About Your Visit        Kopjra Information     Kopjra lets you send messages to your doctor, view your test results, renew your prescriptions, schedule appointments and more. To sign up, go to [email or web address] . Click on \"Log in\" on the left side of the screen, which will take you to the Welcome page. Then click on \"Sign up Now\" on the right side of the page.     You will be asked to enter the access code listed below, as well as some personal information. Please follow the directions to create your username and password.     Your access code is: [email or web address]  Expires: 2018  9:27 AM     Your access code will  in 90 days. If you need help or a new code, please call your [facility] clinic or [phone].        Care EveryWhere ID     This is your Care EveryWhere ID. This could be used by other organizations to access your [place] medical records  [id number]        Your Vitals Were     Pulse Temperature Respirations Height Pulse Oximetry BMI (Body Mass Index)    80 97.6  F (36.4  C) (Tympanic) 16 5' 8\" (1.727 m) 95% 31.49 kg/m2       Blood Pressure from Last 3 Encounters:   17 148/90   17 148/84   17 136/87    Weight from Last 3 Encounters:   17 207 lb 1.6 oz (93.9 kg)   17 204 lb 9.6 oz (92.8 kg)   04/07/15 210 lb 14.4 oz (95.7 kg)              We Performed the Following     Basic metabolic panel     EKG 12-lead complete w/read - Clinics     Hemoglobin          Today's Medication Changes          These changes are accurate as of: 17  9:27 AM.  If you have any questions, ask your nurse or doctor.               Start taking these medicines.        Dose/Directions    lisinopril 10 MG tablet   Commonly known as:  PRINIVIL/ZESTRIL   Used for:  Essential " hypertension   Started by:  Humberto Vora PA-C        Dose:  10 mg   Take 1 tablet (10 mg) by mouth daily   Quantity:  90 tablet   Refills:  1            Where to get your medicines      These medications were sent to [facility] [place] - [address]  [address] [place] [address]     Phone:  [phone]     lisinopril 10 MG tablet                Primary Care Provider Office Phone # Fax #    Humberto Vora PA-C [phone]       [address]  [facility] [address]        Equal Access to Services     [facility]: Hadii aad ku hadasho Soomaali, waaxda luqadaha, qaybta kaalmada adeegyada, waxay idiin hayaan adeeg kharayazmin plasencia . So [facility] [phone].    ATENCIÓN: Si habla español, tiene a parrish disposición servicios gratuitos de asistencia lingüística. Llame al [phone].    We comply with applicable federal civil rights laws and [place] laws. We do not discriminate on the basis of race, color, national origin, age, disability, sex, sexual orientation, or gender identity.            Thank you!     Thank you for choosing [facility]  for your care. Our goal is always to provide you with excellent care. Hearing back from our patients is one way we can continue to improve our services. Please take a few minutes to complete the written survey that you may receive in the mail after your visit with us. Thank you!             Your Updated Medication List - Protect others around you: Learn how to safely use, store and throw away your medicines at www.disposemymeds.org.          This list is accurate as of: 11/7/17  9:27 AM.  Always use your most recent med list.                   Brand Name Dispense Instructions for use Diagnosis    lisinopril 10 MG tablet    PRINIVIL/ZESTRIL    90 tablet    Take 1 tablet (10 mg) by mouth daily    Essential hypertension       ZYRTEC ALLERGY 10 MG Caps   Generic drug:  cetirizine HCl      Take by  mouth daily

## 2017-11-07 NOTE — NURSING NOTE
"Chief Complaint   Patient presents with     Pre-Op Exam       Initial There were no vitals taken for this visit. Estimated body mass index is 31.57 kg/(m^2) as calculated from the following:    Height as of 7/28/17: 5' 7.5\" (1.715 m).    Weight as of 7/28/17: 204 lb 9.6 oz (92.8 kg).  Medication Reconciliation: complete     Angelica Kay CMA      "

## 2017-11-07 NOTE — PROGRESS NOTES
[facility]  [address] [address] [phone]  Dept: [phone]    PRE-OP EVALUATION:  Today's date: 2017    Yakov Kahn (: 1960) presents for pre-operative evaluation assessment as requested by Dr. Ricardo Poe.  He requires evaluation and anesthesia risk assessment prior to undergoing surgery/procedure for treatment of Rotator Cuff injury/tear.  Proposed procedure: Rotator Cuff Repair - left    Date of Surgery/ Procedure: 2017  Time of Surgery/ Procedure: 6:30am  H[facility]Surgical [facility]  Fax number for surgical facility: [phone]  Primary Physician: Humberto Vora  Type of Anesthesia Anticipated: General    Patient has a Health Care Directive or Living Will:  NO    Preop Questions 2017   1.  Do you have a history of heart attack, stroke, stent, bypass or surgery on an artery in the head, neck, heart or legs? No   2.  Do you ever have any pain or discomfort in your chest? No   3.  Do you have a history of  Heart Failure? No   4.   Are you troubled by shortness of breath when:  walking on a level surface, or up a slight hill, or at night? No   5.  Do you currently have a cold, bronchitis or other respiratory infection? No   6.  Do you have a cough, shortness of breath, or wheezing? No   7.  Do you sometimes get pains in the calves of your legs when you walk? No   8. Do you or anyone in your family have previous history of blood clots? No   9.  Do you or does anyone in your family have a serious bleeding problem such as prolonged bleeding following surgeries or cuts? No   10. Have you ever had problems with anemia or been told to take iron pills? No   11. Have you had any abnormal blood loss such as black, tarry or bloody stools? No   12. Have you ever had a blood transfusion? No   13. Have you or any of your relatives ever had problems with anesthesia? No   14. Do you have sleep apnea, excessive snoring or  daytime drowsiness? No   15. Do you have any prosthetic heart valves? No   16. Do you have prosthetic joints? No           HPI:                                                      Brief HPI related to upcoming procedure: Patient notes a persistent and increasing hx of bilateral shoulder pain. Will be undergoing repair, left first      See problem list for active medical problems.  Problems all longstanding and stable, except as noted/documented.  See ROS for pertinent symptoms related to these conditions.                                                                                                  .    MEDICAL HISTORY:                                                    Patient Active Problem List    Diagnosis Date Noted     Bilateral shoulder pain, unspecified chronicity 05/19/2017     Priority: Medium     Rotator cuff tear arthropathy of both shoulders 05/19/2017     Priority: Medium     CARDIOVASCULAR SCREENING; LDL GOAL LESS THAN 160 02/10/2010     Priority: Medium     Plantar fascial fibromatosis 09/17/2004     Priority: Medium      History reviewed. No pertinent past medical history.  History reviewed. No pertinent surgical history.  Current Outpatient Prescriptions   Medication Sig Dispense Refill     cetirizine HCl (ZYRTEC ALLERGY) 10 MG CAPS Take by mouth daily       OTC products: None, except as noted above    No Known Allergies   Latex Allergy: NO    Social History   Substance Use Topics     Smoking status: Never Smoker     Smokeless tobacco: Former User     Alcohol use Yes     History   Drug Use No       REVIEW OF SYSTEMS:                                                    Constitutional, neuro, ENT, endocrine, pulmonary, cardiac, gastrointestinal, genitourinary, musculoskeletal, integument and psychiatric systems are negative, except as otherwise noted.      EXAM:                                                    /90 (BP Location: Right arm, Patient Position: Chair, Cuff Size: Adult Large)   "Pulse 80  Temp 97.6  F (36.4  C) (Tympanic)  Resp 16  Ht 5' 8\" (1.727 m)  Wt 207 lb 1.6 oz (93.9 kg)  SpO2 95%  BMI 31.49 kg/m2    GENERAL APPEARANCE: healthy, alert and no distress     EYES: EOMI,  PERRL     HENT: ear canals and TM's normal and nose and mouth without ulcers or lesions     NECK: no adenopathy, no asymmetry, masses, or scars and thyroid normal to palpation     RESP: lungs clear to auscultation - no rales, rhonchi or wheezes     CV: regular rates and rhythm and no murmur, click or rub     ABDOMEN:  soft, nontender, no HSM or masses and bowel sounds normal; small umbilical  Hernia, non tender     MS: shoulder not examined; left radial pulse intact     NEURO: Normal strength and tone, sensory exam grossly normal, mentation intact and speech normal    DIAGNOSTICS:                                                    EKG: Normal Sinus Rhythm, mild LVH enlargement    Hemoglobin: 16.5  Serum Potassium: 5.2  Serum Creatinine: 0.97    Recent Labs   Lab Test  07/28/17   0800   NA  140   POTASSIUM  3.8   CR  0.84        IMPRESSION:                                                    Reason for surgery/procedure: Rotator cuff repair, left  Diagnosis/reason for consult: Pre-operative examination    The proposed surgical procedure is considered INTERMEDIATE risk.    REVISED CARDIAC RISK INDEX  The patient has the following serious cardiovascular risks for perioperative complications such as (MI, PE, VFib and 3  AV Block):  No serious cardiac risks  INTERPRETATION: 0 risks: Class I (very low risk - 0.4% complication rate)    The patient has the following additional risks for perioperative complications:  No identified additional risks      ICD-10-CM    1. Preop general physical exam Z01.818 EKG 12-lead complete w/read - Clinics     Basic metabolic panel     Hemoglobin   2. Rotator cuff tear arthropathy of both shoulders M12.811     M12.812    3. Essential hypertension I10 lisinopril (PRINIVIL/ZESTRIL) 10 MG " tablet     EKG 12-lead complete w/read - Clinics    He will rtc for blood pressure check prior to procedure. We are starting lisinopril today.       RECOMMENDATIONS:                                                        ACE Inhibitor or Angiotensin Receptor Blocker (ARB) Use  Ace inhibitor or Angiotensin Receptor Blocker (ARB) and should HOLD this medication for the 24 hours prior to surgery.        APPROVAL GIVEN to proceed with proposed procedure, without further diagnostic evaluation       Signed Electronically by: Humberto Vora PA-C    Copy of this evaluation report is provided to requesting physician.    [facility] Preop Guidelines

## 2017-11-07 NOTE — PATIENT INSTRUCTIONS
On the morning of your surgery do NOT take your blood pressure medication      Before Your Surgery      Call your surgeon if there is any change in your health. This includes signs of a cold or flu (such as a sore throat, runny nose, cough, rash or fever).    Do not smoke, drink alcohol or take over the counter medicine (unless your surgeon or primary care doctor tells you to) for the 24 hours before and after surgery.    If you take prescribed drugs: Follow your doctor s orders about which medicines to take and which to stop until after surgery.    Eating and drinking prior to surgery: follow the instructions from your surgeon    Take a shower or bath the night before surgery. Use the soap your surgeon gave you to gently clean your skin. If you do not have soap from your surgeon, use your regular soap. Do not shave or scrub the surgery site.  Wear clean pajamas and have clean sheets on your bed.

## 2017-11-08 LAB
ANION GAP SERPL CALCULATED.3IONS-SCNC: 8 MMOL/L (ref 3–14)
BUN SERPL-MCNC: 14 MG/DL (ref 7–30)
CALCIUM SERPL-MCNC: 9.8 MG/DL (ref 8.5–10.1)
CHLORIDE SERPL-SCNC: 105 MMOL/L (ref 94–109)
CO2 SERPL-SCNC: 24 MMOL/L (ref 20–32)
CREAT SERPL-MCNC: 0.97 MG/DL (ref 0.66–1.25)
GFR SERPL CREATININE-BSD FRML MDRD: 80 ML/MIN/1.7M2
GLUCOSE SERPL-MCNC: 111 MG/DL (ref 70–99)
POTASSIUM SERPL-SCNC: 5.2 MMOL/L (ref 3.4–5.3)
SODIUM SERPL-SCNC: 137 MMOL/L (ref 133–144)

## 2017-11-15 ENCOUNTER — ALLIED HEALTH/NURSE VISIT (OUTPATIENT)
Dept: PEDIATRICS | Facility: CLINIC | Age: 57
End: 2017-11-15
Payer: COMMERCIAL

## 2017-11-15 VITALS — SYSTOLIC BLOOD PRESSURE: 146 MMHG | DIASTOLIC BLOOD PRESSURE: 86 MMHG

## 2017-11-15 DIAGNOSIS — I10 ESSENTIAL HYPERTENSION: Primary | ICD-10-CM

## 2017-11-15 PROCEDURE — 99207 ZZC NO CHARGE NURSE ONLY: CPT | Performed by: PHYSICIAN ASSISTANT

## 2017-11-15 NOTE — PROGRESS NOTES
Yakov Kahn is enrolled/participating in the retail pharmacy Blood Pressure Goals Achievement Program (BPGAP).  Yakov Kahn was evaluated at [facility] on November 15, 2017 at which time his blood pressure was:    BP Readings from Last 3 Encounters:   11/15/17 146/86   11/07/17 148/90   08/03/17 148/84     Reviewed lifestyle modifications for blood pressure control and reduction: including making healthy food choices, managing weight, getting regular exercise, smoking cessation, reducing alcohol consumption, monitoring blood pressure regularly.     Yakov Kahn is not experiencing symptoms.    Follow-Up: BP is not at goal of < 140/90mmHg (patient 18+ years of age with or without diabetes), Recommended follow-up with PCP.  Routing to PCP for further review.    Recommendation to Provider: none    Yakov Kahn was evaluated for enrollment into the PGEN study today.    Patient eligible for enrollment:  Yes  Patient interested in enrollment:  No    Completed by: Thank You~  Zari Hernandez, PharmD,   [facility], [place]  [phone]

## 2017-11-17 NOTE — PROGRESS NOTES
Please let patient know BP still high. Recommend recheck in one week and after surgery. If still high, we'll consider dose or medication change. His blood work was normal from his visit so he's good to go for surgery.

## 2017-12-05 PROBLEM — M12.812 ROTATOR CUFF TEAR ARTHROPATHY OF BOTH SHOULDERS: Status: RESOLVED | Noted: 2017-05-19 | Resolved: 2017-12-05

## 2017-12-05 PROBLEM — M25.511 BILATERAL SHOULDER PAIN, UNSPECIFIED CHRONICITY: Status: RESOLVED | Noted: 2017-05-19 | Resolved: 2017-12-05

## 2017-12-05 PROBLEM — M75.101 ROTATOR CUFF TEAR ARTHROPATHY OF BOTH SHOULDERS: Status: RESOLVED | Noted: 2017-05-19 | Resolved: 2017-12-05

## 2017-12-05 PROBLEM — M25.512 BILATERAL SHOULDER PAIN, UNSPECIFIED CHRONICITY: Status: RESOLVED | Noted: 2017-05-19 | Resolved: 2017-12-05

## 2017-12-05 PROBLEM — M75.102 ROTATOR CUFF TEAR ARTHROPATHY OF BOTH SHOULDERS: Status: RESOLVED | Noted: 2017-05-19 | Resolved: 2017-12-05

## 2017-12-05 PROBLEM — M12.811 ROTATOR CUFF TEAR ARTHROPATHY OF BOTH SHOULDERS: Status: RESOLVED | Noted: 2017-05-19 | Resolved: 2017-12-05

## 2017-12-05 NOTE — PROGRESS NOTES
"Subjective:    HPI                    Objective:    System    Physical Exam    General     ROS    Assessment/Plan:      DISCHARGE REPORT    Progress reporting period is from 5/19/17 to 7/28/17.       SUBJECTIVE  Subjective changes noted by patient:   reports that shoulder has been \"noisy\", pain levels are not too bad, icing shoulders nightly, R shoulder is doing OK lately, trying to \"baby\" L shoulder as much as he can, exercises are going good, not been extremely consistent w/ HEP, overhead lifting exercises seems to be helpful    Current pain level is NA  .     Previous pain level was  NA  .   Changes in function:  Yes (See Goal flowsheet attached for changes in current functional level)  Adverse reaction to treatment or activity: None    OBJECTIVE  Changes noted in objective findings:  Patient has failed to return to therapy so current objective findings are unknown.  Objective: ER 4-/5, scapt 4-/5     ASSESSMENT/PLAN  Updated problem list and treatment plan: Diagnosis 1:  B shoulder pain  STG/LTGs have been met or progress has been made towards goals:  Yes (See Goal flow sheet completed today.)  Assessment of Progress: The patient has not returned to therapy. Current status is unknown.  Self Management Plans:  Patient has been instructed in a home treatment program.  Patient  has been instructed in self management of symptoms.  I have re-evaluated this patient and find that the nature, scope, duration and intensity of the therapy is appropriate for the medical condition of the patient.  Yakov continues to require the following intervention to meet STG and LTG's:  PT intervention is no longer required to meet STG/LTG.    Recommendations:  Patient failed to return to PT for further treatment after last visit.  Therefore, patient will be discharged at this time.      Please refer to the daily flowsheet for treatment today, total treatment time and time spent performing 1:1 timed codes.                "

## 2017-12-15 ENCOUNTER — THERAPY VISIT (OUTPATIENT)
Dept: PHYSICAL THERAPY | Facility: CLINIC | Age: 57
End: 2017-12-15
Payer: COMMERCIAL

## 2017-12-15 DIAGNOSIS — Z98.890 S/P LEFT ROTATOR CUFF REPAIR: Primary | ICD-10-CM

## 2017-12-15 PROCEDURE — 97161 PT EVAL LOW COMPLEX 20 MIN: CPT | Mod: GP | Performed by: PHYSICAL THERAPIST

## 2017-12-15 PROCEDURE — 97110 THERAPEUTIC EXERCISES: CPT | Mod: GP | Performed by: PHYSICAL THERAPIST

## 2017-12-15 NOTE — PROGRESS NOTES
[facility] Evaluation  Subjective:    Patient is a 57 year old male presenting with rehab left shoulder hpi.   Yakov Kahn is a 57 year old male with a left shoulder condition.  Condition occurred with:  Unknown cause.  Condition occurred: for unknown reasons.  This is a new condition  Pain ongoing for years, tore L last spring, R problematic since 2013 Nov 30th surgery on L.    Site of Pain: no pain reported.  Radiates to:  Shoulder.  Quality: no pain reported.  Pain Scale: no pain reported.  Associated symptoms:  Loss of motion/stiffness. Pain is the same all the time.  Symptoms are exacerbated by using arm at shoulder level, using arm overhead, certain positions, lifting, carrying, using arm behind back and lying on extremity (not using arm at all due to surgical restrictions) and relieved by bracing/immobilizing.  Since onset symptoms are rapidly improving.  Special tests:  MRI.  Previous treatment includes physical therapy (massage, self prescribed exercsies).  There was mild improvement following previous treatment.  General health as reported by patient is good.  Pertinent medical history includes:  None.  Medical allergies: yes.  Other surgeries include:  None reported.  Current medications:  None as reported by the patient.  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  Patient is working in an alternate job.  Primary job tasks include:  Repetitive tasks, lifting, driving and operating a machine.    Barriers include:  None as reported by the patient.    Red flags:  None as reported by the patient.                        Objective:    System                   Shoulder Evaluation:  ROM:  AROM:  not assessed                            PROM:    Flexion:  Left:  100          Abduction:  Left:  60        Internal Rotation:  Left:  To stomach      External Rotation:  Left:  10          Elbow Flexion:  Left:  WNL      Elbow Extension:  Left:  WNL                Strength:   not assessed                      Stability Testing:  not assessed      Special Tests:  not assessed      Palpation:    Left shoulder tenderness present at:  Incisional    Mobility Tests:  not assessed                                                 General     ROS    Assessment/Plan:      Patient is a 57 year old male with left side shoulder complaints.    Patient has the following significant findings with corresponding treatment plan.                Diagnosis 1:  S/p L RCR  Decreased ROM/flexibility - manual therapy, therapeutic exercise and home program  Decreased strength - therapeutic exercise, therapeutic activities and home program  Impaired muscle performance - neuro re-education and home program  Decreased function - therapeutic activities and home program    Therapy Evaluation Codes:   1) History comprised of:   Personal factors that impact the plan of care:      Past/current experiences, Profession and Time since onset of symptoms.    Comorbidity factors that impact the plan of care are:      None.     Medications impacting care: None.  2) Examination of Body Systems comprised of:   Body structures and functions that impact the plan of care:      Shoulder.   Activity limitations that impact the plan of care are:      Bathing, Cooking, Driving, Dressing, Grasping, Lifting, Reading/Computer work, Throwing and Sleeping.  3) Clinical presentation characteristics are:   Stable/Uncomplicated.  4) Decision-Making    Moderate complexity using standardized patient assessment instrument and/or measureable assessment of functional outcome.  Cumulative Therapy Evaluation is: Low complexity.    Previous and current functional limitations:  (See Goal Flow Sheet for this information)    Short term and Long term goals: (See Goal Flow Sheet for this information)     Communication ability:  Patient appears to be able to clearly communicate and understand verbal and written communication and follow directions  correctly.  Treatment Explanation - The following has been discussed with the patient:   RX ordered/plan of care  Anticipated outcomes  Possible risks and side effects  This patient would benefit from PT intervention to resume normal activities.   Rehab potential is excellent.    Frequency:  2 X week, once daily  Duration:  for 4 weeks tapering to 1 X a week over 6 weeks tapering to 2x a month for 2 months  Discharge Plan:  Achieve all LTG.  Independent in home treatment program.  Reach maximal therapeutic benefit.    Please refer to the daily flowsheet for treatment today, total treatment time and time spent performing 1:1 timed codes.

## 2017-12-15 NOTE — MR AVS SNAPSHOT
After Visit Summary   12/15/2017    Yakov Kahn    MRN: [id number]           Patient Information     Date Of Birth          1960        Visit Information        Provider Department      12/15/2017 7:00 AM Swathi Yang, PT [facility] [place] [facility]        Today's Diagnoses     S/P left rotator cuff repair    -  1       Follow-ups after your visit        Your next 10 appointments already scheduled     Dec 19, 2017  7:10 AM CST   LINDA Extremity with Swathi Yang PT   [facility] [place] [facility] ([facility] [place])    [address]  [place] [address]   [phone]            Dec 22, 2017  7:00 AM CST   LINDA Extremity with Swathi Yang PT   [facility] [place] [facility] ([facility] [place])    [address]  [place] [address]   [phone]            Dec 26, 2017  7:10 AM CST   LINDA Extremity with Swathi Yang PT   [facility] [place] [facility] ([facility] [place])    [address]  [place] [address]   [phone]            Dec 29, 2017  7:00 AM CST   LINDA Extremity with Swathi Yang PT   [facility] [place] [facility] ([facility] [place])    [address]  [place] [address]   [phone]            Jan 02, 2018  7:10 AM CST   LINDA Extremity with Swathi Yang PT   [facility] [place] [facility] ([facility] [place])    [address]  [place] [address]   [phone]            Jan 05, 2018  7:00 AM CST   LINDA Extremity with Swathi Yang PT   [facility] [place] [facility] ([facility] [place])    [address]  [place] [address]   [phone]            Jan 09, 2018  7:10 AM CST   LINDA Extremity with Swathi Yang PT   [facility] [place] [facility] ([facility] [place])    [address]  [place] [address]   [phone]            Jan 12, 2018  7:40 AM CST   LINDA Extremity with Swathi Yang PT   [facility]  [facility] ([facility])    [address] [place] [address]   [phone]            2018  7:10 AM CST   LINDA Extremity with Swathi Yang, PT   [facility] ([facility] [place])    [address] [place] [address]   [phone]            2018  7:40 AM CST   LINDA Extremity with Sawthi Yang, PT   [facility] ([facility])    [address] [place] [address]   [phone]              Who to contact     If you have questions or need follow up information about today's clinic visit or your schedule please contact [facility] directly at [phone].  Normal or non-critical lab and imaging results will be communicated to you by MyChart, letter or phone within 4 business days after the clinic has received the results. If you do not hear from us within 7 days, please contact the clinic through [employer]zikh[employer] or phone. If you have a critical or abnormal lab result, we will notify you by phone as soon as possible.  Submit refill requests through Conf[employer] or call your pharmacy and they will forward the refill request to us. Please allow 3 business days for your refill to be completed.          Additional Information About Your Visit        Beez[employer]art Information     Conf[employer] lets you send messages to your doctor, view your test results, renew your prescriptions, schedule appointments and more. To sign up, go to [email or web address] . Click on \"Log in\" on the left side of the screen, which will take you to the Welcome page. Then click on \"Sign up Now\" on the right side of the page.     You will be asked to enter the access code listed below, as well as some personal information. Please follow the directions to create your username and password.     Your access code is: [email or web address]  Expires: 2018  9:27 AM     Your access code will  in 90 days. If you need help or a new code, " please call your [place] clinic or [phone].        Care EveryWhere ID     This is your Care EveryWhere ID. This could be used by other organizations to access your [place] medical records  [id number]         Blood Pressure from Last 3 Encounters:   11/15/17 146/86   11/07/17 148/90   08/03/17 148/84    Weight from Last 3 Encounters:   11/07/17 93.9 kg (207 lb 1.6 oz)   07/28/17 92.8 kg (204 lb 9.6 oz)   04/07/15 95.7 kg (210 lb 14.4 oz)              We Performed the Following     HC PT EVAL, LOW COMPLEXITY     LINDA INITIAL EVAL REPORT     THERAPEUTIC EXERCISES        Primary Care Provider Office Phone # Fax #    Humberto Vora PA-C [phone] [address] [facility] [address]        Equal Access to Services     [facility]: Hadii aad ku hadasho Soomaali, waaxda luqadaha, qaybta kaalmada adeegyada, waxay idiin hayaan adekofi plasencia . So [facility] [phone].    ATENCIÓN: Si habla español, tiene a parrish disposición servicios gratuitos de asistencia lingüística. Llame al [phone].    We comply with applicable federal civil rights laws and [place] laws. We do not discriminate on the basis of race, color, national origin, age, disability, sex, sexual orientation, or gender identity.            Thank you!     Thank you for choosing [facility]  for your [facility]. Our goal is always to provide you with excellent care. Hearing back from our patients is one way we can continue to improve our services. Please take a few minutes to complete the written survey that you may receive in the mail after your visit with us. Thank you!             Your Updated Medication List - Protect others around you: Learn how to safely use, store and throw away your medicines at www.disposemymeds.org.          This list is accurate as of: 12/15/17  2:45 PM.  Always use your most recent med list.                   Brand Name Dispense Instructions for use Diagnosis     lisinopril 10 MG tablet    PRINIVIL/ZESTRIL    90 tablet    Take 1 tablet (10 mg) by mouth daily    Essential hypertension       ZYRTEC ALLERGY 10 MG Caps   Generic drug:  cetirizine HCl      Take by mouth daily

## 2017-12-19 ENCOUNTER — THERAPY VISIT (OUTPATIENT)
Dept: PHYSICAL THERAPY | Facility: CLINIC | Age: 57
End: 2017-12-19
Payer: COMMERCIAL

## 2017-12-19 DIAGNOSIS — Z98.890 S/P LEFT ROTATOR CUFF REPAIR: ICD-10-CM

## 2017-12-19 PROCEDURE — 97110 THERAPEUTIC EXERCISES: CPT | Mod: GP | Performed by: PHYSICAL THERAPIST

## 2017-12-22 ENCOUNTER — THERAPY VISIT (OUTPATIENT)
Dept: PHYSICAL THERAPY | Facility: CLINIC | Age: 57
End: 2017-12-22
Payer: COMMERCIAL

## 2017-12-22 DIAGNOSIS — Z98.890 S/P LEFT ROTATOR CUFF REPAIR: ICD-10-CM

## 2017-12-22 PROCEDURE — 97110 THERAPEUTIC EXERCISES: CPT | Mod: GP | Performed by: PHYSICAL THERAPIST

## 2017-12-26 ENCOUNTER — THERAPY VISIT (OUTPATIENT)
Dept: PHYSICAL THERAPY | Facility: CLINIC | Age: 57
End: 2017-12-26
Payer: COMMERCIAL

## 2017-12-26 DIAGNOSIS — Z98.890 S/P LEFT ROTATOR CUFF REPAIR: ICD-10-CM

## 2017-12-26 PROCEDURE — 97110 THERAPEUTIC EXERCISES: CPT | Mod: GP | Performed by: PHYSICAL THERAPIST

## 2017-12-29 ENCOUNTER — THERAPY VISIT (OUTPATIENT)
Dept: PHYSICAL THERAPY | Facility: CLINIC | Age: 57
End: 2017-12-29
Payer: COMMERCIAL

## 2017-12-29 DIAGNOSIS — Z98.890 S/P LEFT ROTATOR CUFF REPAIR: ICD-10-CM

## 2017-12-29 PROCEDURE — 97110 THERAPEUTIC EXERCISES: CPT | Mod: GP | Performed by: PHYSICAL THERAPIST

## 2018-01-02 ENCOUNTER — THERAPY VISIT (OUTPATIENT)
Dept: PHYSICAL THERAPY | Facility: CLINIC | Age: 58
End: 2018-01-02
Payer: COMMERCIAL

## 2018-01-02 DIAGNOSIS — Z98.890 S/P LEFT ROTATOR CUFF REPAIR: ICD-10-CM

## 2018-01-02 PROCEDURE — 97140 MANUAL THERAPY 1/> REGIONS: CPT | Mod: GP | Performed by: PHYSICAL THERAPIST

## 2018-01-02 PROCEDURE — 97110 THERAPEUTIC EXERCISES: CPT | Mod: GP | Performed by: PHYSICAL THERAPIST

## 2018-01-05 ENCOUNTER — THERAPY VISIT (OUTPATIENT)
Dept: PHYSICAL THERAPY | Facility: CLINIC | Age: 58
End: 2018-01-05
Payer: COMMERCIAL

## 2018-01-05 ENCOUNTER — TELEPHONE (OUTPATIENT)
Dept: FAMILY MEDICINE | Facility: CLINIC | Age: 58
End: 2018-01-05

## 2018-01-05 DIAGNOSIS — Z98.890 S/P LEFT ROTATOR CUFF REPAIR: ICD-10-CM

## 2018-01-05 PROCEDURE — 97110 THERAPEUTIC EXERCISES: CPT | Mod: GP | Performed by: PHYSICAL THERAPIST

## 2018-01-05 PROCEDURE — 97140 MANUAL THERAPY 1/> REGIONS: CPT | Mod: GP | Performed by: PHYSICAL THERAPIST

## 2018-01-05 NOTE — LETTER
[facility]  [address] [place] [address]  [phone]    2018    Re: Yakov Kahn   :   1960  MRN:  [id number]   REFERRING PHYSICIAN:   Ricardo Poe  [facility]  Date of Initial Evaluation:  12/15/2017  Visits:  Rxs Used: 7  Reason for Referral:  S/P left rotator cuff repair    PROGRESS  REPORT  Progress reporting period is from 12/15/17 to 18.       SUBJECTIVE  Subjective changes noted by patient:  reports that shoulder is doing well, not using sling much and arm is tolerating this well, no issues w/ sleeping, trying not to use the arm much other than with his exercises    Current Pain level: 0/10.     Initial Pain level: 0/10.   Changes in function:  Yes (See Goal flowsheet attached for changes in current functional level)  Adverse reaction to treatment or activity: None    OBJECTIVE  Changes noted in objective findings:  Yes,   Objective: PROM flex 140, abduction 95, ER 30 @ side, ER 40 @ 90 abd     ASSESSMENT/PLAN  Updated problem list and treatment plan: Diagnosis 1:  S/p L RCR  Decreased ROM/flexibility - manual therapy, therapeutic exercise and home program  Decreased strength - therapeutic exercise, therapeutic activities and home program  Impaired muscle performance - neuro re-education and home program  Decreased function - therapeutic activities and home program  STG/LTGs have been met or progress has been made towards goals:  Yes (See Goal flow sheet completed today.)  Assessment of Progress: The patient's condition is improving.  Self Management Plans:  Patient has been instructed in a home treatment program.  Patient  has been instructed in self management of symptoms.  I have re-evaluated this patient and find that the nature, scope, duration and intensity of the therapy is appropriate for the medical condition of the patient.  Yakov continues to require the following intervention to meet  STG and LTG's:  PT    Recommendations:  This patient would benefit from continued therapy.     Frequency:  1 X week, once daily  Duration:  for 6 weeks tapering to 2 X a month over 6-8 weeks      Re: Yakov Kahn   :   1960          Thank you for your referral.    INQUIRIESCASSIE Cadena   [facility]  [address] [place] [address]  Phone: [phone]  Fax: [phone]

## 2018-01-05 NOTE — MR AVS SNAPSHOT
After Visit Summary   1/5/2018    Yakov Kahn    MRN: [id number]           Patient Information     Date Of Birth          1960        Visit Information        Provider Department      1/5/2018 7:00 AM Swathi Yang, PT [facility] [place] [facility]        Today's Diagnoses     S/P left rotator cuff repair           Follow-ups after your visit        Your next 10 appointments already scheduled     Jan 09, 2018  7:10 AM CST   LINDA Extremity with Swathi Yang PT   [facility] [place] [facility] ([facility] [place])    [address]  [place] [address]   [phone]            Jan 12, 2018  7:40 AM CST   LINDA Extremity with Swathi Yang PT   [facility] [place] [facility] ([facility] [place])    [address]  [place] [address]   [phone]            Jan 16, 2018  7:10 AM CST   LINDA Extremity with Swathi Yang PT   [facility] [place] [facility] ([facility] [place])    [address]  [place] [address]   [phone]            Jan 19, 2018  7:40 AM CST   LINDA Extremity with Swathi Yang PT   [facility] [place] [facility] ([facility] [place])    [address]  [place] [address]   [phone]            Jan 23, 2018  7:10 AM CST   LINDA Extremity with Swathi Yang PT   [facility] [place] [facility] ([facility] [place])    [address]  [place] [address]   [phone]            Jan 26, 2018  7:40 AM CST   LINDA Extremity with Swathi Yang PT   [facility] [place] [facility] ([facility] [place])    [address]  [place] [address]   [phone]            Jan 30, 2018  7:10 AM CST   LINDA Extremity with Swathi Yang PT   [facility] [place] [facility] ([facility] [place])    [address]  [place] [address]   [phone]            Feb 02, 2018  7:40 AM CST   LINDA Extremity with Swathi Yang PT   [facility] [place] [facility]  "([facility])    [address] [place] [address] [phone]              Who to contact     If you have questions or need follow up information about today's clinic visit or your schedule please contact [facility] directly at [phone].  Normal or non-critical lab and imaging results will be communicated to you by MyChart, letter or phone within 4 business days after the clinic has received the results. If you do not hear from us within 7 days, please contact the clinic through MyChart or phone. If you have a critical or abnormal lab result, we will notify you by phone as soon as possible.  Submit refill requests through [employer] or call your pharmacy and they will forward the refill request to us. Please allow 3 business days for your refill to be completed.          Additional Information About Your Visit        [employer] Information     [employer] lets you send messages to your doctor, view your test results, renew your prescriptions, schedule appointments and more. To sign up, go to [email or web address] . Click on \"Log in\" on the left side of the screen, which will take you to the Welcome page. Then click on \"Sign up Now\" on the right side of the page.     You will be asked to enter the access code listed below, as well as some personal information. Please follow the directions to create your username and password.     Your access code is: [email or web address]  Expires: 2018  9:27 AM     Your access code will  in 90 days. If you need help or a new code, please call your [place] clinic or [phone].        Care EveryWhere ID     This is your Care EveryWhere ID. This could be used by other organizations to access your [place] medical records  [id number]         Blood Pressure from Last 3 Encounters:   11/15/17 146/86   17 148/90   17 148/84    Weight from Last 3 Encounters:   17 93.9 kg (207 lb 1.6 oz)   17 92.8 kg (204 lb 9.6 oz) "   04/07/15 95.7 kg (210 lb 14.4 oz)              We Performed the Following     LINDA PROGRESS NOTES REPORT     MANUAL THER TECH,1+REGIONS,EA 15 MIN     THERAPEUTIC EXERCISES        Primary Care Provider Office Phone # Fax #    Humberto Vora PA-C [phone] [address]  [facility] [address]        Equal Access to Services     [facility]: Hadii aad ku hadasho Soomaali, waaxda luqadaha, qaybta kaalmada adeegyada, waxay idiin hayaan adeeg kharash la'aan ah. So [facility] [phone].    ATENCIÓN: Si habla español, tiene a parrish disposición servicios gratuitos de asistencia lingüística. Lidyaame al [phone].    We comply with applicable federal civil rights laws and [place] laws. We do not discriminate on the basis of race, color, national origin, age, disability, sex, sexual orientation, or gender identity.            Thank you!     Thank you for choosing [facility]  for your care. Our goal is always to provide you with excellent care. Hearing back from our patients is one way we can continue to improve our services. Please take a few minutes to complete the written survey that you may receive in the mail after your visit with us. Thank you!             Your Updated Medication List - Protect others around you: Learn how to safely use, store and throw away your medicines at www.disposemymeds.org.          This list is accurate as of: 1/5/18  8:38 AM.  Always use your most recent med list.                   Brand Name Dispense Instructions for use Diagnosis    lisinopril 10 MG tablet    PRINIVIL/ZESTRIL    90 tablet    Take 1 tablet (10 mg) by mouth daily    Essential hypertension       ZYRTEC ALLERGY 10 MG Caps   Generic drug:  cetirizine HCl      Take by mouth daily

## 2018-01-05 NOTE — PROGRESS NOTES
Subjective:  HPI                    Objective:  System    Physical Exam    General     ROS    Assessment/Plan:    PROGRESS  REPORT    Progress reporting period is from 12/15/17 to 1/5/18.       SUBJECTIVE  Subjective changes noted by patient:  reports that shoulder is doing well, not using sling much and arm is tolerating this well, no issues w/ sleeping, trying not to use the arm much other than with his exercises    Current Pain level: 0/10.     Initial Pain level: 0/10.   Changes in function:  Yes (See Goal flowsheet attached for changes in current functional level)  Adverse reaction to treatment or activity: None    OBJECTIVE  Changes noted in objective findings:  Yes,   Objective: PROM flex 140, abduction 95, ER 30 @ side, ER 40 @ 90 abd     ASSESSMENT/PLAN  Updated problem list and treatment plan: Diagnosis 1:  S/p L RCR  Decreased ROM/flexibility - manual therapy, therapeutic exercise and home program  Decreased strength - therapeutic exercise, therapeutic activities and home program  Impaired muscle performance - neuro re-education and home program  Decreased function - therapeutic activities and home program  STG/LTGs have been met or progress has been made towards goals:  Yes (See Goal flow sheet completed today.)  Assessment of Progress: The patient's condition is improving.  Self Management Plans:  Patient has been instructed in a home treatment program.  Patient  has been instructed in self management of symptoms.  I have re-evaluated this patient and find that the nature, scope, duration and intensity of the therapy is appropriate for the medical condition of the patient.  Yakov continues to require the following intervention to meet STG and LTG's:  PT    Recommendations:  This patient would benefit from continued therapy.     Frequency:  1 X week, once daily  Duration:  for 6 weeks tapering to 2 X a month over 6-8 weeks          Please refer to the daily flowsheet for treatment today, total treatment  time and time spent performing 1:1 timed codes.

## 2018-01-12 ENCOUNTER — THERAPY VISIT (OUTPATIENT)
Dept: PHYSICAL THERAPY | Facility: CLINIC | Age: 58
End: 2018-01-12
Payer: COMMERCIAL

## 2018-01-12 DIAGNOSIS — Z98.890 S/P LEFT ROTATOR CUFF REPAIR: ICD-10-CM

## 2018-01-12 PROCEDURE — 97110 THERAPEUTIC EXERCISES: CPT | Mod: GP | Performed by: PHYSICAL THERAPIST

## 2018-01-12 NOTE — TELEPHONE ENCOUNTER
Called and spoke with patient, he stated that he will stop into pharmacy next week to have BP checked.   Preethi Morgan MA

## 2018-01-16 ENCOUNTER — ALLIED HEALTH/NURSE VISIT (OUTPATIENT)
Dept: FAMILY MEDICINE | Facility: CLINIC | Age: 58
End: 2018-01-16
Payer: COMMERCIAL

## 2018-01-16 ENCOUNTER — TELEPHONE (OUTPATIENT)
Dept: FAMILY MEDICINE | Facility: CLINIC | Age: 58
End: 2018-01-16

## 2018-01-16 VITALS — SYSTOLIC BLOOD PRESSURE: 144 MMHG | DIASTOLIC BLOOD PRESSURE: 86 MMHG

## 2018-01-16 DIAGNOSIS — Z01.30 BP CHECK: Primary | ICD-10-CM

## 2018-01-16 PROCEDURE — 99207 ZZC NO CHARGE NURSE ONLY: CPT | Performed by: PHYSICIAN ASSISTANT

## 2018-01-16 NOTE — PROGRESS NOTES
Yakov Kahn is enrolled/participating in the retail pharmacy Blood Pressure Goals Achievement Program (BPGAP).  Yakov Kahn was evaluated at [facility] on January 16, 2018 at which time his blood pressure was:    BP Readings from Last 3 Encounters:   01/16/18 144/86   11/15/17 146/86   11/07/17 148/90     Reviewed lifestyle modifications for blood pressure control and reduction: including making healthy food choices, managing weight, getting regular exercise, smoking cessation, reducing alcohol consumption, monitoring blood pressure regularly.     Yakov Kahn is not experiencing symptoms.    Follow-Up: BP is not at goal of < 140/90mmHg (patient 18+ years of age with or without diabetes), Recommended follow-up with PCP.  Routing to PCP for further review.    Recommendation to Provider: follow-up blood pressure check within 30 days, MTM referral    Yakov Kahn was evaluated for enrollment into the PGEN study today.    Patient eligible for enrollment:  No  Patient interested in enrollment:  No    Completed by: Guicho Fong    Thank You   [facility]

## 2018-01-16 NOTE — MR AVS SNAPSHOT
After Visit Summary   1/16/2018    Yakov Kahn    MRN: [id number]           Patient Information     Date Of Birth          1960        Visit Information        Provider Department      1/16/2018 4:53 PM Humberto Vora PA-C [facility] [place]        Today's Diagnoses     BP check    -  1       Follow-ups after your visit        Your next 10 appointments already scheduled     Jan 19, 2018  7:40 AM CST   LINDA Extremity with Swathi Yang, PT   [facility] [place] [facility] ([facility] [place])    [address]  [place] [address]   [phone]            Jan 26, 2018  7:40 AM CST   LINDA Extremity with Swathi Yang, PT   [facility] [place] [facility] ([facility] [place])    [address]  [place] [address]   [phone]            Feb 02, 2018  7:40 AM CST   LINDA Extremity with Swathi Yang, PT   [facility] [place] [facility] ([facility] [place])    [address]  [place] [address]   [phone]              Future tests that were ordered for you today     Open Future Orders        Priority Expected Expires Ordered    Basic metabolic panel Routine  1/17/2019 1/17/2018            Who to contact     If you have questions or need follow up information about today's clinic visit or your schedule please contact [facility] directly at [phone].  Normal or non-critical lab and imaging results will be communicated to you by MyChart, letter or phone within 4 business days after the clinic has received the results. If you do not hear from us within 7 days, please contact the clinic through MyChart or phone. If you have a critical or abnormal lab result, we will notify you by phone as soon as possible.  Submit refill requests through [employer] or call your pharmacy and they will forward the refill request to us. Please allow 3 business days for your refill to be completed.          Additional Information About Your  "Visit        [employer] Information     [employer] lets you send messages to your doctor, view your test results, renew your prescriptions, schedule appointments and more. To sign up, go to [email or web address] . Click on \"Log in\" on the left side of the screen, which will take you to the Welcome page. Then click on \"Sign up Now\" on the right side of the page.     You will be asked to enter the access code listed below, as well as some personal information. Please follow the directions to create your username and password.     Your access code is: [email or web address]  Expires: 2018  9:27 AM     Your access code will  in 90 days. If you need help or a new code, please call your [place] clinic or [phone].        Care EveryWhere ID     This is your Care EveryWhere ID. This could be used by other organizations to access your [place] medical records  [id number]         Blood Pressure from Last 3 Encounters:   18 144/86   11/15/17 146/86   17 148/90    Weight from Last 3 Encounters:   17 207 lb 1.6 oz (93.9 kg)   17 204 lb 9.6 oz (92.8 kg)   04/07/15 210 lb 14.4 oz (95.7 kg)               Primary Care Provider Office Phone # Fax #    Humberto Sj Vora PA-C [phone] [address] [facility] [address] [facility] [address]        Equal Access to Services     [facility] AH: Hadii aad ku hadasho Soomaali, waaxda luqadaha, qaybta kaalmada adeegyada, waxay maudein haynicanorn bethany mccray. So [facility] [phone].    ATENCIÓN: Si habla español, tiene a parrish disposición servicios gratuitos de asistencia lingüística. Llame al [phone].    We comply with applicable federal civil rights laws and [place] laws. We do not discriminate on the basis of race, color, national origin, age, disability, sex, sexual orientation, or gender identity.            Thank you!     Thank you for choosing [facility] [place]  for your care. Our goal is always to provide you with excellent care. " Hearing back from our patients is one way we can continue to improve our services. Please take a few minutes to complete the written survey that you may receive in the mail after your visit with us. Thank you!             Your Updated Medication List - Protect others around you: Learn how to safely use, store and throw away your medicines at www.disposemymeds.org.          This list is accurate as of: 1/16/18 11:59 PM.  Always use your most recent med list.                   Brand Name Dispense Instructions for use Diagnosis    lisinopril 10 MG tablet    PRINIVIL/ZESTRIL    90 tablet    Take 1 tablet (10 mg) by mouth daily    Essential hypertension       ZYRTEC ALLERGY 10 MG Caps   Generic drug:  cetirizine HCl      Take by mouth daily

## 2018-01-17 NOTE — PROGRESS NOTES
BP still elevated. Please let patient know i'd like him to begin taken two tabs (20mg) of his medication and recheck his BP in one week at the pharmacy. At that time we'll also need to have him check the blood to make sure his kidney's are tolerating the medication. A follow up visit will be dependent on what his labs and BP look after doubling his dose.

## 2018-01-19 ENCOUNTER — THERAPY VISIT (OUTPATIENT)
Dept: PHYSICAL THERAPY | Facility: CLINIC | Age: 58
End: 2018-01-19
Payer: COMMERCIAL

## 2018-01-19 DIAGNOSIS — Z98.890 S/P LEFT ROTATOR CUFF REPAIR: ICD-10-CM

## 2018-01-19 PROCEDURE — 97110 THERAPEUTIC EXERCISES: CPT | Mod: GP | Performed by: PHYSICAL THERAPIST

## 2018-01-19 PROCEDURE — 97140 MANUAL THERAPY 1/> REGIONS: CPT | Mod: GP | Performed by: PHYSICAL THERAPIST

## 2018-01-25 DIAGNOSIS — Z01.30 BP CHECK: ICD-10-CM

## 2018-01-25 DIAGNOSIS — I10 ESSENTIAL HYPERTENSION: ICD-10-CM

## 2018-01-25 PROCEDURE — 36415 COLL VENOUS BLD VENIPUNCTURE: CPT | Performed by: PHYSICIAN ASSISTANT

## 2018-01-25 PROCEDURE — 80048 BASIC METABOLIC PNL TOTAL CA: CPT | Performed by: PHYSICIAN ASSISTANT

## 2018-01-26 ENCOUNTER — THERAPY VISIT (OUTPATIENT)
Dept: PHYSICAL THERAPY | Facility: CLINIC | Age: 58
End: 2018-01-26
Payer: COMMERCIAL

## 2018-01-26 DIAGNOSIS — Z98.890 S/P LEFT ROTATOR CUFF REPAIR: ICD-10-CM

## 2018-01-26 LAB
ANION GAP SERPL CALCULATED.3IONS-SCNC: 9 MMOL/L (ref 3–14)
BUN SERPL-MCNC: 11 MG/DL (ref 7–30)
CALCIUM SERPL-MCNC: 9.1 MG/DL (ref 8.5–10.1)
CHLORIDE SERPL-SCNC: 105 MMOL/L (ref 94–109)
CO2 SERPL-SCNC: 24 MMOL/L (ref 20–32)
CREAT SERPL-MCNC: 0.94 MG/DL (ref 0.66–1.25)
GFR SERPL CREATININE-BSD FRML MDRD: 82 ML/MIN/1.7M2
GLUCOSE SERPL-MCNC: 171 MG/DL (ref 70–99)
POTASSIUM SERPL-SCNC: 4.4 MMOL/L (ref 3.4–5.3)
SODIUM SERPL-SCNC: 138 MMOL/L (ref 133–144)

## 2018-01-26 PROCEDURE — 97110 THERAPEUTIC EXERCISES: CPT | Mod: GP | Performed by: PHYSICAL THERAPIST

## 2018-02-02 ENCOUNTER — ALLIED HEALTH/NURSE VISIT (OUTPATIENT)
Dept: FAMILY MEDICINE | Facility: CLINIC | Age: 58
End: 2018-02-02
Payer: COMMERCIAL

## 2018-02-02 ENCOUNTER — THERAPY VISIT (OUTPATIENT)
Dept: PHYSICAL THERAPY | Facility: CLINIC | Age: 58
End: 2018-02-02
Payer: COMMERCIAL

## 2018-02-02 VITALS — SYSTOLIC BLOOD PRESSURE: 138 MMHG | DIASTOLIC BLOOD PRESSURE: 84 MMHG

## 2018-02-02 DIAGNOSIS — I10 ESSENTIAL HYPERTENSION: Primary | ICD-10-CM

## 2018-02-02 DIAGNOSIS — Z98.890 S/P LEFT ROTATOR CUFF REPAIR: ICD-10-CM

## 2018-02-02 PROCEDURE — 97110 THERAPEUTIC EXERCISES: CPT | Mod: GP | Performed by: PHYSICAL THERAPIST

## 2018-02-02 PROCEDURE — 99207 ZZC NO CHARGE NURSE ONLY: CPT | Performed by: PHYSICIAN ASSISTANT

## 2018-02-02 PROCEDURE — 97140 MANUAL THERAPY 1/> REGIONS: CPT | Mod: GP | Performed by: PHYSICAL THERAPIST

## 2018-02-02 NOTE — PROGRESS NOTES
Yakov Kahn is enrolled/participating in the retail pharmacy Blood Pressure Goals Achievement Program (BPGAP).  Yakov Kahn was evaluated at [facility] on February 2, 2018 at which time his blood pressure was:    BP Readings from Last 3 Encounters:   02/02/18 138/84   01/16/18 144/86   11/15/17 146/86     Reviewed lifestyle modifications for blood pressure control and reduction: including making healthy food choices, managing weight, getting regular exercise, smoking cessation, reducing alcohol consumption, monitoring blood pressure regularly.     Yakov Kahn is not experiencing symptoms.    Follow-Up: BP is at goal of < 140/90mmHg (patient 18+ years of age with or without diabetes).  Recommended follow-up at pharmacy in 6 months.     Recommendation to Provider: none    Yakov Kahn was evaluated for enrollment into the PGEN study today.    Patient eligible for enrollment:  No  Patient interested in enrollment:  No    Completed by: Deja Garcia, PharmD  [facility]

## 2018-02-02 NOTE — MR AVS SNAPSHOT
After Visit Summary   2/2/2018    Yakov Kahn    MRN: [id number]           Patient Information     Date Of Birth          1960        Visit Information        Provider Department      2/2/2018 9:28 AM Humberto Vora PA-C [facility] [place]        Today's Diagnoses     Essential hypertension    -  1       Follow-ups after your visit        Your next 10 appointments already scheduled     Feb 08, 2018  8:00 AM CST   [facility]    [address] [address] [phone]           Bring a current list of meds and any records pertaining to this visit. For Physicals, please bring immunization records and any forms needing to be filled out. Please arrive 10 minutes early to complete paperwork.            Feb 16, 2018  7:40 AM CST   LINDA Extremity with Swathi Yang PT   [facility] [place] [facility] ([facility])    [address] [address] [facility] [address] [phone]            Feb 27, 2018  3:30 PM CST   Office Visit with Angelica Gibbs, [facility] MTM ([facility])    [address] [address] [phone]           Bring a current list of meds and any records pertaining to this visit. For Physicals, please bring immunization records and any forms needing to be filled out. Please arrive 10 minutes early to complete paperwork.              Who to contact     If you have questions or need follow up information about today's clinic visit or your schedule please contact [facility] directly at [phone].  Normal or non-critical lab and imaging results will be communicated to you by MyChart, letter or phone within 4 business days after the clinic has received the results. If you do not hear from us within 7 days, please contact the clinic through MyChart or phone. If you  "have a critical or abnormal lab result, we will notify you by phone as soon as possible.  Submit refill requests through [employer] or call your pharmacy and they will forward the refill request to us. Please allow 3 business days for your refill to be completed.          Additional Information About Your Visit        [employer] Information     [employer] lets you send messages to your doctor, view your test results, renew your prescriptions, schedule appointments and more. To sign up, go to [email or web address] . Click on \"Log in\" on the left side of the screen, which will take you to the Welcome page. Then click on \"Sign up Now\" on the right side of the page.     You will be asked to enter the access code listed below, as well as some personal information. Please follow the directions to create your username and password.     Your access code is: [email or web address]  Expires: 2018  9:27 AM     Your access code will  in 90 days. If you need help or a new code, please call your [place] clinic or [phone].        Care EveryWhere ID     This is your Care EveryWhere ID. This could be used by other organizations to access your [place] medical records  [id number]         Blood Pressure from Last 3 Encounters:   18 138/84   18 144/86   11/15/17 146/86    Weight from Last 3 Encounters:   17 207 lb 1.6 oz (93.9 kg)   17 204 lb 9.6 oz (92.8 kg)   04/07/15 210 lb 14.4 oz (95.7 kg)              Today, you had the following     No orders found for display       Primary Care Provider Office Phone # Fax #    Humberto Vora PA-C [phone] [address] [place] [address]        Equal Access to Services     [facility] ANNITA : Hadii sapphire stillo Sohector, waaxda luqadaha, qaybta kaalmada adeegyada, bernadine mccray. So [facility] [phone].    ATENCIÓN: Si habla español, tiene a parrish disposición servicios gratuitos de asistencia lingüística. Llame al " [phone].    We comply with applicable federal civil rights laws and [place] laws. We do not discriminate on the basis of race, color, national origin, age, disability, sex, sexual orientation, or gender identity.            Thank you!     Thank you for choosing [facility] [place]  for your care. Our goal is always to provide you with excellent care. Hearing back from our patients is one way we can continue to improve our services. Please take a few minutes to complete the written survey that you may receive in the mail after your visit with us. Thank you!             Your Updated Medication List - Protect others around you: Learn how to safely use, store and throw away your medicines at www.disposemymeds.org.          This list is accurate as of 2/2/18  9:30 AM.  Always use your most recent med list.                   Brand Name Dispense Instructions for use Diagnosis    lisinopril 10 MG tablet    PRINIVIL/ZESTRIL    90 tablet    Take 1 tablet (10 mg) by mouth daily    Essential hypertension       ZYRTEC ALLERGY 10 MG Caps   Generic drug:  cetirizine HCl      Take by mouth daily

## 2018-02-08 ENCOUNTER — OFFICE VISIT (OUTPATIENT)
Dept: FAMILY MEDICINE | Facility: CLINIC | Age: 58
End: 2018-02-08
Payer: COMMERCIAL

## 2018-02-08 VITALS
OXYGEN SATURATION: 94 % | TEMPERATURE: 97.6 F | HEART RATE: 88 BPM | DIASTOLIC BLOOD PRESSURE: 84 MMHG | SYSTOLIC BLOOD PRESSURE: 148 MMHG | WEIGHT: 209.5 LBS | RESPIRATION RATE: 18 BRPM | BODY MASS INDEX: 31.75 KG/M2 | HEIGHT: 68 IN

## 2018-02-08 DIAGNOSIS — I10 ESSENTIAL HYPERTENSION: Primary | ICD-10-CM

## 2018-02-08 PROCEDURE — 99213 OFFICE O/P EST LOW 20 MIN: CPT | Performed by: PHYSICIAN ASSISTANT

## 2018-02-08 RX ORDER — LISINOPRIL 10 MG/1
20 TABLET ORAL DAILY
Qty: 90 TABLET | Refills: 1 | COMMUNITY
Start: 2018-02-08 | End: 2018-02-27

## 2018-02-08 NOTE — NURSING NOTE
"Chief Complaint   Patient presents with     Hypertension       Initial /88 (BP Location: Right arm, Patient Position: Chair, Cuff Size: Adult Large)  Pulse 88  Temp 97.6  F (36.4  C) (Tympanic)  Resp 18  Ht 5' 8\" (1.727 m)  Wt 209 lb 8 oz (95 kg)  SpO2 94%  BMI 31.85 kg/m2 Estimated body mass index is 31.85 kg/(m^2) as calculated from the following:    Height as of this encounter: 5' 8\" (1.727 m).    Weight as of this encounter: 209 lb 8 oz (95 kg).  Medication Reconciliation: complete   Preethi Morgan MA       "

## 2018-02-08 NOTE — MR AVS SNAPSHOT
After Visit Summary   2/8/2018    Yakov Kahn    MRN: [id number]           Patient Information     Date Of Birth          1960        Visit Information        Provider Department      2/8/2018 8:00 AM Humberto Vora PA-C [facility]        Today's Diagnoses     Essential hypertension    -  1       Follow-ups after your visit        Follow-up notes from your care team     Return in about 1 week (around 2/15/2018) for BP Recheck at pharmacy.      Your next 10 appointments already scheduled     Feb 16, 2018  7:40 AM CST   LINDA Extremity with Swathi Yang, PT   [facility] [place] [facility] ([facility])    [address] [address] [phone]            Feb 27, 2018  3:30 PM CST   Office Visit with Angelica Gibbs [facility] M[facility] ([facility])    [address] [address] [phone]           Bring a current list of meds and any records pertaining to this visit. For Physicals, please bring immunization records and any forms needing to be filled out. Please arrive 10 minutes early to complete paperwork.              Who to contact     If you have questions or need follow up information about today's clinic visit or your schedule please contact [facility] directly at [phone].  Normal or non-critical lab and imaging results will be communicated to you by MyChart, letter or phone within 4 business days after the clinic has received the results. If you do not hear from us within 7 days, please contact the clinic through MyChart or phone. If you have a critical or abnormal lab result, we will notify you by phone as soon as possible.  Submit refill requests through [employer] or call your pharmacy and they will forward the refill request to us. Please allow 3 business days for your refill to be completed.          Additional Information About Your Visit       "  MyChart Information     [employer] lets you send messages to your doctor, view your test results, renew your prescriptions, schedule appointments and more. To sign up, go to [email or web address] . Click on \"Log in\" on the left side of the screen, which will take you to the Welcome page. Then click on \"Sign up Now\" on the right side of the page.     You will be asked to enter the access code listed below, as well as some personal information. Please follow the directions to create your username and password.     Your access code is: [email or web address]  Expires: 2018  8:21 AM     Your access code will  in 90 days. If you need help or a new code, please call your [place] clinic or [phone].        Care EveryWhere ID     This is your Care EveryWhere ID. This could be used by other organizations to access your [place] medical records  [id number]        Your Vitals Were     Pulse Temperature Respirations Height Pulse Oximetry BMI (Body Mass Index)    88 97.6  F (36.4  C) (Tympanic) 18 5' 8\" (1.727 m) 94% 31.85 kg/m2       Blood Pressure from Last 3 Encounters:   18 150/88   18 138/84   18 144/86    Weight from Last 3 Encounters:   18 209 lb 8 oz (95 kg)   17 207 lb 1.6 oz (93.9 kg)   17 204 lb 9.6 oz (92.8 kg)              Today, you had the following     No orders found for display       Primary Care Provider Office Phone # Fax #    Humberto Vora PA-C [phone]       [address] [address] [facility] [address]        Equal Access to Services     KARISSA ARIZA : John Noel, giselle parekh, qasimon kaalmada ingrid, bernadine mccray. So [facility] [phone].    ATENCIÓN: Si habla español, tiene a parrish disposición servicios gratuitos de asistencia lingüística. Llame al [phone].    We comply with applicable federal civil rights laws and [place] laws. We do not discriminate on the basis of race, color, national " origin, age, disability, sex, sexual orientation, or gender identity.            Thank you!     Thank you for choosing [facility] [place]  for your care. Our goal is always to provide you with excellent care. Hearing back from our patients is one way we can continue to improve our services. Please take a few minutes to complete the written survey that you may receive in the mail after your visit with us. Thank you!             Your Updated Medication List - Protect others around you: Learn how to safely use, store and throw away your medicines at www.disposemymeds.org.          This list is accurate as of 2/8/18  8:22 AM.  Always use your most recent med list.                   Brand Name Dispense Instructions for use Diagnosis    lisinopril 10 MG tablet    PRINIVIL/ZESTRIL    90 tablet    Take 1 tablet (10 mg) by mouth daily    Essential hypertension       ZYRTEC ALLERGY 10 MG Caps   Generic drug:  cetirizine HCl      Take by mouth daily

## 2018-02-08 NOTE — PROGRESS NOTES
"  SUBJECTIVE:   Yakov Kahn is a 57 year old male who presents to clinic today for the following health issues:    Hypertension Follow-up      Outpatient blood pressures Patient has checked BP at pharmacy a few times.     Low Salt Diet: low salt      Amount of exercise or physical activity: work     Problems taking medications regularly: No    Medication side effects: coughing, scratchy through     Diet: regular (no restrictions)    -Patient presents to follow up on blood pressure  -We had started him on lisinopril 10mg back in November   -With regular rechecks we needed to bump him to 2 tabs (20mg) daily to  get control  -Recently when moving to 20mg he developed a cough, scratchy throat so did not take them since Sunday  -His BP is elevated  -While on mediation he denies other side effects  -denies headaches, chest pain or dyspnea      Problem list and histories reviewed & adjusted, as indicated.  Additional history: as documented    Patient Active Problem List   Diagnosis     Plantar fascial fibromatosis     CARDIOVASCULAR SCREENING; LDL GOAL LESS THAN 160     Essential hypertension     S/P left rotator cuff repair     History reviewed. No pertinent surgical history.    Social History   Substance Use Topics     Smoking status: Never Smoker     Smokeless tobacco: Former User     Alcohol use Yes     Family History   Problem Relation Age of Onset     Hypertension Brother      DIABETES No family hx of      Other Cancer No family hx of            Reviewed and updated as needed this visit by clinical staff       Reviewed and updated as needed this visit by Provider         ROS:  Constitutional, HEENT, cardiovascular, pulmonary, gi and gu systems are negative, except as otherwise noted.    OBJECTIVE:     /84  Pulse 88  Temp 97.6  F (36.4  C) (Tympanic)  Resp 18  Ht 5' 8\" (1.727 m)  Wt 209 lb 8 oz (95 kg)  SpO2 94%  BMI 31.85 kg/m2  Body mass index is 31.85 kg/(m^2).  GENERAL: healthy, alert and no " distress  EYES: Eyes grossly normal to inspection, PERRL and conjunctivae and sclerae normal  RESP: lungs clear to auscultation - no rales, rhonchi or wheezes  CV: regular rates and rhythm, normal S1 S2, no S3 or S4 and no murmur, click or rub  MS: no peripheral edema    Diagnostic Test Results:  none - reviewed 01/18 bmp    ASSESSMENT/PLAN:   1. Essential hypertension  BP not at goal today, in fact he has not taken his medication since Sunday because he had developed a cough and remembered this was a side effect. Unable to know if this was true side effect or if just simple viral. When last checked at pharmacy while on 20mg, he was at goal. I'd like him to restart the 20mg daily for one week and recheck at the pharmacy. We'll update our plan at that time - consider switching to amlodipine or chlorthalidone if not improving or tolerating.     Humberto Vora PA-C  [facility]

## 2018-02-10 ENCOUNTER — MYC MEDICAL ADVICE (OUTPATIENT)
Dept: FAMILY MEDICINE | Facility: CLINIC | Age: 58
End: 2018-02-10

## 2018-02-10 DIAGNOSIS — I10 ESSENTIAL HYPERTENSION: Primary | ICD-10-CM

## 2018-02-12 RX ORDER — LISINOPRIL 20 MG/1
20 TABLET ORAL DAILY
Qty: 30 TABLET | Refills: 0 | Status: SHIPPED | OUTPATIENT
Start: 2018-02-12 | End: 2018-02-27

## 2018-02-12 NOTE — TELEPHONE ENCOUNTER
Teed up 20 mg dose, not sure if you would want to do this or the 10?  China Dangelo, RN  Triage Nurse

## 2018-02-12 NOTE — TELEPHONE ENCOUNTER
Ill send in one month of the 20mg dosing. Have him start this, recheck at pharmacy a few times over the next few weeks. If going well, without side effects, we'll just send in 6 months at that time.

## 2018-02-16 ENCOUNTER — THERAPY VISIT (OUTPATIENT)
Dept: PHYSICAL THERAPY | Facility: CLINIC | Age: 58
End: 2018-02-16
Payer: COMMERCIAL

## 2018-02-16 DIAGNOSIS — Z98.890 S/P LEFT ROTATOR CUFF REPAIR: ICD-10-CM

## 2018-02-16 PROCEDURE — 97140 MANUAL THERAPY 1/> REGIONS: CPT | Mod: GP | Performed by: PHYSICAL THERAPIST

## 2018-02-16 PROCEDURE — 97110 THERAPEUTIC EXERCISES: CPT | Mod: GP | Performed by: PHYSICAL THERAPIST

## 2018-02-16 PROCEDURE — 97112 NEUROMUSCULAR REEDUCATION: CPT | Mod: GP | Performed by: PHYSICAL THERAPIST

## 2018-02-23 ENCOUNTER — THERAPY VISIT (OUTPATIENT)
Dept: PHYSICAL THERAPY | Facility: CLINIC | Age: 58
End: 2018-02-23
Payer: COMMERCIAL

## 2018-02-23 DIAGNOSIS — Z98.890 S/P LEFT ROTATOR CUFF REPAIR: ICD-10-CM

## 2018-02-23 PROCEDURE — 97110 THERAPEUTIC EXERCISES: CPT | Mod: GP | Performed by: PHYSICAL THERAPIST

## 2018-02-23 PROCEDURE — 97112 NEUROMUSCULAR REEDUCATION: CPT | Mod: GP | Performed by: PHYSICAL THERAPIST

## 2018-02-27 ENCOUNTER — OFFICE VISIT (OUTPATIENT)
Dept: PHARMACY | Facility: CLINIC | Age: 58
End: 2018-02-27
Payer: COMMERCIAL

## 2018-02-27 VITALS
WEIGHT: 207.5 LBS | HEART RATE: 76 BPM | DIASTOLIC BLOOD PRESSURE: 89 MMHG | SYSTOLIC BLOOD PRESSURE: 142 MMHG | BODY MASS INDEX: 31.55 KG/M2

## 2018-02-27 DIAGNOSIS — J31.0 CHRONIC RHINITIS: ICD-10-CM

## 2018-02-27 DIAGNOSIS — J31.0 CHRONIC RHINITIS, UNSPECIFIED TYPE: ICD-10-CM

## 2018-02-27 DIAGNOSIS — I10 ESSENTIAL HYPERTENSION: Primary | ICD-10-CM

## 2018-02-27 DIAGNOSIS — E63.9 NUTRITIONAL DEFICIENCY: ICD-10-CM

## 2018-02-27 PROCEDURE — 99607 MTMS BY PHARM ADDL 15 MIN: CPT | Performed by: PHARMACIST

## 2018-02-27 PROCEDURE — 99605 MTMS BY PHARM NP 15 MIN: CPT | Performed by: PHARMACIST

## 2018-02-27 RX ORDER — AMLODIPINE BESYLATE 5 MG/1
5 TABLET ORAL AT BEDTIME
Qty: 30 TABLET | Refills: 2 | Status: SHIPPED | OUTPATIENT
Start: 2018-02-27 | End: 2021-05-16

## 2018-02-27 NOTE — MR AVS SNAPSHOT
After Visit Summary   2/27/2018    Yakov Kahn    MRN: [id number]           Patient Information     Date Of Birth          1960        Visit Information        Provider Department      2/27/2018 3:30 PM Angelica Gibbs, [facility] MTM        Today's Diagnoses     Essential hypertension    -  1      Care Instructions    Recommendations from today's MTM visit:                                                    MTM (medication therapy management) is a service provided by a clinical pharmacist designed to help you get the most of out of your medicines.     Start amlodipine 5mg at bedtime.    Next MTM/pharmacist visit: 1 month    To schedule another MTM appointment, please call the clinic directly or you may call the MTM scheduling line at [phone] or toll-free at [phone].     My Clinical Pharmacist's contact information:                                                      It was a pleasure seeing you today!  Please feel free to contact me with any questions or concerns you have.      Angelica Gibbs, PharmD [facility]  Medication Therapy Management Practitioner   #[phone]    You may receive a survey about the MTM services you received.  I would appreciate your feedback to help me serve you better in the future. Please fill it out and return it when you can. Your comments will be anonymous.      My healthcare goals:                                                      Goals for patients with hypertension (high blood pressure):  Your blood pressure should be less than <130/80.   Today, yours was   BP Readings from Last 1 Encounters:   02/08/18 148/84   .  Things you can do to help lower your blood pressure:  1) Take your medications as directed  2) Regular exercise- Aim for at least 30 minutes of daily physical activity.  3) Weight loss if overweight- losing as little as 10 lbs. can reduce blood pressure  4) Avoid excess dietary sodium (salt).  You should not  consume more than 1500mg of sodium per day.  Following this restriction can lower your blood pressure as much as adding another medication.  -Foods high in sodium include: ham, solomon, deli meats, canned soups and vegetables, frozen meals, gravy, chips, cheeses, fast food, bread.  -1 teaspoon of table salt is about 2300mg of sodium.  5) Avoid alcohol- if you drink, please do so in moderation (no more than 1 drink per day for women or 2 drinks per day for men).  6) Quit smoking- Tobacco injures vessel walls and speeds up the process of hardening of the arteries, making it harder to control your blood pressure.  7) Look for ways to reduce stress in your life.  8)  Try and get plenty of sleep.  9) Consider the DASH diet.  This diet can lower your blood pressure as much as adding another medication if followed correctly.   a. The DASH diet is high in fruits and vegetables, whole grains, fat-free or low-fat milk products, fish and poultry, beans, seeds and nuts.  b. The DASH diet is low in salt, sodium, sugar, sweets, high-sugar drinks, red meat, saturated fat and trans fats.  c. For more information on the DASH diet, visit the National Heart, Lung and Blood [place] at http://www.nhlbi.nih.gov                      Follow-ups after your visit        Your next 10 appointments already scheduled     Mar 09, 2018  7:40 AM CST   LINDA Extremity with Swathi Yang PT   [facility]    [address] [address] [phone]            Mar 20, 2018  8:00 AM CDT   SHORT with Angelica Gibbs [facility] ([facility])    [address] [address] [phone]              Who to contact     If you have questions or need follow up information about today's clinic visit or your schedule please contact [facility] directly at [phone].  Normal or non-critical lab and imaging  results will be communicated to you by [employer], letter or phone within 4 business days after the clinic has received the results. If you do not hear from us within 7 days, please contact the clinic through [employer] or phone. If you have a critical or abnormal lab result, we will notify you by phone as soon as possible.  Submit refill requests through [employer] or call your pharmacy and they will forward the refill request to us. Please allow 3 business days for your refill to be completed.          Additional Information About Your Visit        [employer] Information     [employer] gives you secure access to your electronic health record. If you see a primary care provider, you can also send messages to your care team and make appointments. If you have questions, please call your primary care clinic.  If you do not have a primary care provider, please call [phone] and they will assist you.        Care EveryWhere ID     This is your Care EveryWhere ID. This could be used by other organizations to access your [place] medical records  [id number]        Your Vitals Were     Pulse BMI (Body Mass Index)                76 31.55 kg/m2           Blood Pressure from Last 3 Encounters:   02/27/18 142/89   02/08/18 148/84   02/02/18 138/84    Weight from Last 3 Encounters:   02/27/18 207 lb 8 oz (94.1 kg)   02/08/18 209 lb 8 oz (95 kg)   11/07/17 207 lb 1.6 oz (93.9 kg)              Today, you had the following     No orders found for display         Today's Medication Changes          These changes are accurate as of 2/27/18  3:54 PM.  If you have any questions, ask your nurse or doctor.               Start taking these medicines.        Dose/Directions    amLODIPine 5 MG tablet   Commonly known as:  NORVASC   Used for:  Essential hypertension        Dose:  5 mg   Take 1 tablet (5 mg) by mouth At Bedtime (to replace lisinopril)   Quantity:  30 tablet   Refills:  2            Where to get your medicines      These medications  were sent to [facility] [place] - [place] - [address]   [address]  [address], [place] [address]     Phone:  [phone]     amLODIPine 5 MG tablet                Primary Care Provider Office Phone # Fax #    Humberto Vora PA-C [phone] [address]  [facility] [address]        Equal Access to Services     [facility]: Hadii aad ku hadasho Soomaali, waaxda luqadaha, qaybta kaalmada adeegyada, waxay idiin hayaan adeeg kharash la'aan ah. So [facility] [phone].    ATENCIÓN: Si habla español, tiene a parrish disposición servicios gratuitos de asistencia lingüística. Llame al [phone].    We comply with applicable federal civil rights laws and [place] laws. We do not discriminate on the basis of race, color, national origin, age, disability, sex, sexual orientation, or gender identity.            Thank you!     Thank you for choosing [facility]  for your care. Our goal is always to provide you with excellent care. Hearing back from our patients is one way we can continue to improve our services. Please take a few minutes to complete the written survey that you may receive in the mail after your visit with us. Thank you!             Your Updated Medication List - Protect others around you: Learn how to safely use, store and throw away your medicines at www.disposemymeds.org.          This list is accurate as of 2/27/18  3:54 PM.  Always use your most recent med list.                   Brand Name Dispense Instructions for use Diagnosis    amLODIPine 5 MG tablet    NORVASC    30 tablet    Take 1 tablet (5 mg) by mouth At Bedtime (to replace lisinopril)    Essential hypertension       FISH OIL + D3 PO      Take 3 capsules by mouth daily        MAGNESIUM OXIDE PO      Take 500 mg by mouth daily        ZYRTEC ALLERGY 10 MG Caps   Generic drug:  cetirizine HCl      Take by mouth daily

## 2018-02-27 NOTE — PROGRESS NOTES
SUBJECTIVE/OBJECTIVE:                           Yakov Kahn is a 57 year old male coming in for an initial visit for Medication Therapy Management.  He was referred to me from [facility] pharmacy for hypertension.     Chief Complaint: Hypertension  .  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  He currently is not taking his lisinopril, he does not like to take medications  The patient fills medications at [facility]: YES.     Hypertension: Current medications include none.  Patient does self-monitor BP. Home BP monitoring in range of 138's systolic over 84-87's diastolic three-four times a week.  Patient reports the following medication side effects: coughing with lisinopril, he is worried about restarting it and the reason he has not picked it up as recommended at his last visit.  He wants to control blood pressure by diet and exercise and weight loss.  Doing exercise and try for weight loss  Wt Readings from Last 4 Encounters:   02/27/18 207 lb 8 oz (94.1 kg)   02/08/18 209 lb 8 oz (95 kg)   11/07/17 207 lb 1.6 oz (93.9 kg)   07/28/17 204 lb 9.6 oz (92.8 kg)     The 10-year ASCVD risk score (Chitra SWAPNA Jr, et al., 2013) is: 7.7%    Values used to calculate the score:      Age: 57 years      Sex: Male      Is Non- : No      Diabetic: No      Tobacco smoker: No      Systolic Blood Pressure: 148 mmHg      Is BP treated: Yes      HDL Cholesterol: 99 mg/dL      Total Cholesterol: 262 mg/dL    Supplement:  Fish oil for joints, 3 capsules per day and  magnesium 500mg QD for blood pressure.  He reports no side effects.  He would like to continue these medications.    Allergic rhinitis: Current medications include cetirizine 10mg once daily.Pt feels that current therapy is effective.     Current labs include:  Today's Vitals: /89  Pulse 76  Wt 207 lb 8 oz (94.1 kg)  BMI 31.55 kg/m2  BP Readings from Last 3 Encounters:   02/08/18 148/84   02/02/18 138/84   01/16/18 144/86     Lab Results   Component Value Date    CHOL 262 07/28/2017     Lab Results   Component Value Date    TRIG 123 07/28/2017     Lab Results   Component Value Date    HDL 99 07/28/2017     Lab Results   Component Value Date     07/28/2017       Last Basic Metabolic Panel:  Lab Results   Component Value Date     01/25/2018      Lab Results   Component Value Date    POTASSIUM 4.4 01/25/2018     Lab Results   Component Value Date    CHLORIDE 105 01/25/2018     Lab Results   Component Value Date    BUN 11 01/25/2018     Lab Results   Component Value Date    CR 0.94 01/25/2018     GFR Estimate   Date Value Ref Range Status   01/25/2018 82 >60 mL/min/1.7m2 Final     Comment:     Non  GFR Calc   11/07/2017 80 >60 mL/min/1.7m2 Final     Comment:     Non  GFR Calc   07/28/2017 >90  Non  GFR Calc   >60 mL/min/1.7m2 Final     GFR Estimate If Black   Date Value Ref Range Status   01/25/2018 >90 >60 mL/min/1.7m2 Final     Comment:      GFR Calc   11/07/2017 >90 >60 mL/min/1.7m2 Final     Comment:      GFR Calc   07/28/2017 >90   GFR Calc   >60 mL/min/1.7m2 Final     No results found for: TSH]    Most Recent Immunizations   Administered Date(s) Administered     TDAP Vaccine (Adacel) 04/07/2015       ASSESSMENT:                             Current medications were reviewed today.     Medication Adherence: fair, needs improvement - see below    Hypertension: Needs Improvement. Patient is not meeting  BP goal of < 130/80mmHg.  Pt did not want to start medication but looking back at his trend, per the new guidelines, treatment would be recommended.  Pt would benefit from the following changes - addition of amlodipine, watching diet, increasing exercise and weight loss and sodium restriction    Supplement:  Stable.  Pt did not want to stop supplements.  Suggested based on the new guidelines foods rich in potassium were helpful for hypertension.    Allergic rhinitis:  stable    PLAN:                            Start amlodipine 5mg at bedtime.  Continue non-pharmacological therapy: weight loss, DASH diet, exercise    Next MTM/pharmacist visit: 1 month    I spent 25 minutes with this patient today. All changes were made via collaborative practice agreement with Humberto Vora. A copy of the visit note was provided to the patient's primary care provider.    The patient was given a summary of these recommendations as an after visit summary.     Angelica Gibbs, PharmD [facility]  Medication Therapy Management Practitioner   #[phone]

## 2018-02-27 NOTE — PATIENT INSTRUCTIONS
Recommendations from today's MTM visit:                                                    MTM (medication therapy management) is a service provided by a clinical pharmacist designed to help you get the most of out of your medicines.     Start amlodipine 5mg at bedtime.    Next MTM/pharmacist visit: 1 month    To schedule another MTM appointment, please call the clinic directly or you may call the MTM scheduling line at [phone] or toll-free at [phone].     My Clinical Pharmacist's contact information:                                                      It was a pleasure seeing you today!  Please feel free to contact me with any questions or concerns you have.      Angelica Gibbs, PharmD [facility]  Medication Therapy Management Practitioner   #[phone]    You may receive a survey about the MTM services you received.  I would appreciate your feedback to help me serve you better in the future. Please fill it out and return it when you can. Your comments will be anonymous.      My healthcare goals:                                                      Goals for patients with hypertension (high blood pressure):  Your blood pressure should be less than <130/80.   Today, yours was   BP Readings from Last 1 Encounters:   02/08/18 148/84   .  Things you can do to help lower your blood pressure:  1) Take your medications as directed  2) Regular exercise- Aim for at least 30 minutes of daily physical activity.  3) Weight loss if overweight- losing as little as 10 lbs. can reduce blood pressure  4) Avoid excess dietary sodium (salt).  You should not consume more than 1500mg of sodium per day.  Following this restriction can lower your blood pressure as much as adding another medication.  -Foods high in sodium include: ham, solomon, deli meats, canned soups and vegetables, frozen meals, gravy, chips, cheeses, fast food, bread.  -1 teaspoon of table salt is about 2300mg of sodium.  5) Avoid alcohol- if you drink,  please do so in moderation (no more than 1 drink per day for women or 2 drinks per day for men).  6) Quit smoking- Tobacco injures vessel walls and speeds up the process of hardening of the arteries, making it harder to control your blood pressure.  7) Look for ways to reduce stress in your life.  8)  Try and get plenty of sleep.  9) Consider the DASH diet.  This diet can lower your blood pressure as much as adding another medication if followed correctly.   a. The DASH diet is high in fruits and vegetables, whole grains, fat-free or low-fat milk products, fish and poultry, beans, seeds and nuts.  b. The DASH diet is low in salt, sodium, sugar, sweets, high-sugar drinks, red meat, saturated fat and trans fats.  c. For more information on the DASH diet, visit the National Heart, Lung and Blood [place] at http://www.nhlbi.nih.gov

## 2018-03-09 ENCOUNTER — THERAPY VISIT (OUTPATIENT)
Dept: PHYSICAL THERAPY | Facility: CLINIC | Age: 58
End: 2018-03-09
Payer: COMMERCIAL

## 2018-03-09 DIAGNOSIS — Z98.890 S/P LEFT ROTATOR CUFF REPAIR: ICD-10-CM

## 2018-03-09 PROCEDURE — 97112 NEUROMUSCULAR REEDUCATION: CPT | Mod: GP | Performed by: PHYSICAL THERAPIST

## 2018-03-09 PROCEDURE — 97110 THERAPEUTIC EXERCISES: CPT | Mod: GP | Performed by: PHYSICAL THERAPIST

## 2018-03-20 ENCOUNTER — OFFICE VISIT (OUTPATIENT)
Dept: PHARMACY | Facility: CLINIC | Age: 58
End: 2018-03-20
Payer: COMMERCIAL

## 2018-03-20 VITALS
BODY MASS INDEX: 31.85 KG/M2 | WEIGHT: 209.5 LBS | HEART RATE: 98 BPM | DIASTOLIC BLOOD PRESSURE: 87 MMHG | SYSTOLIC BLOOD PRESSURE: 142 MMHG

## 2018-03-20 DIAGNOSIS — I10 ESSENTIAL HYPERTENSION: Primary | ICD-10-CM

## 2018-03-20 PROCEDURE — 99607 MTMS BY PHARM ADDL 15 MIN: CPT | Performed by: PHARMACIST

## 2018-03-20 PROCEDURE — 99606 MTMS BY PHARM EST 15 MIN: CPT | Performed by: PHARMACIST

## 2018-03-20 NOTE — PATIENT INSTRUCTIONS
Recommendations from today's MTM visit:                                                      Pill box given today    BP Readings from Last 3 Encounters:   03/20/18 142/87   02/27/18 142/89   02/08/18 148/84     Next MTM/pharmacist visit: 1 month    To schedule another MTM appointment, please call the clinic directly or you may call the MTM scheduling line at [phone] or toll-free at [phone].     My Clinical Pharmacist's contact information:                                                      It was a pleasure seeing you today!  Please feel free to contact me with any questions or concerns you have.      Angelica Gibbs, PharmD [facility]  Medication Therapy Management Practitioner   #[phone]    You may receive a survey about the MTM services you received.  I would appreciate your feedback to help me serve you better in the future. Please fill it out and return it when you can. Your comments will be anonymous.

## 2018-03-20 NOTE — MR AVS SNAPSHOT
After Visit Summary   3/20/2018    Yakov Kahn    MRN: [id number]           Patient Information     Date Of Birth          1960        Visit Information        Provider Department      3/20/2018 8:00 AM Angelica Gibbs, [facility]        Today's Diagnoses     Essential hypertension    -  1      Care Instructions    Recommendations from today's MTM visit:                                                      Pill box given today    BP Readings from Last 3 Encounters:   03/20/18 142/87   02/27/18 142/89   02/08/18 148/84       Next MTM/pharmacist visit: 1 month    To schedule another MTM appointment, please call the clinic directly or you may call the MTM scheduling line at [phone] or toll-free at [phone].     My Clinical Pharmacist's contact information:                                                      It was a pleasure seeing you today!  Please feel free to contact me with any questions or concerns you have.      Angelica Gibbs, PharmD [facility]  Medication Therapy Management Practitioner   #[phone]    You may receive a survey about the MT services you received.  I would appreciate your feedback to help me serve you better in the future. Please fill it out and return it when you can. Your comments will be anonymous.                  Follow-ups after your visit        Your next 10 appointments already scheduled     Mar 23, 2018  7:40 AM CDT   LINDA Extremity with Swathi Yang PT   [place] [facility]    [address] [address] [phone]            May 01, 2018  8:00 AM CDT   SHORT with Angelica Gibbs [facility] ([facility]    [address] [address] [phone]              Who to contact     If you have questions or need follow up information about today's clinic visit or your schedule please contact  [facility] MT directly at [phone].  Normal or non-critical lab and imaging results will be communicated to you by MyChart, letter or phone within 4 business days after the clinic has received the results. If you do not hear from us within 7 days, please contact the clinic through MyChart or phone. If you have a critical or abnormal lab result, we will notify you by phone as soon as possible.  Submit refill requests through [employer] or call your pharmacy and they will forward the refill request to us. Please allow 3 business days for your refill to be completed.          Additional Information About Your Visit        [employer] Information     [employer] gives you secure access to your electronic health record. If you see a primary care provider, you can also send messages to your care team and make appointments. If you have questions, please call your primary care clinic.  If you do not have a primary care provider, please call [phone] and they will assist you.        Care EveryWhere ID     This is your Care EveryWhere ID. This could be used by other organizations to access your [place] medical records  [id number]        Your Vitals Were     Pulse                   98            Blood Pressure from Last 3 Encounters:   03/20/18 142/87   02/27/18 142/89   02/08/18 148/84    Weight from Last 3 Encounters:   02/27/18 207 lb 8 oz (94.1 kg)   02/08/18 209 lb 8 oz (95 kg)   11/07/17 207 lb 1.6 oz (93.9 kg)              Today, you had the following     No orders found for display       Primary Care Provider Office Phone # Fax #    Humberto Vora PA-C [phone]       [address] [address] [facility] [address]        Equal Access to Services     [facility]: Hadii aad unno bailey Sohector, waaxda luqadaha, qaybta kaalmada adeegyada, bernadine plasencia . So [facility] [phone].    ATENCIÓN: Si habla español, tiene a parrish disposición servicios gratuitos de asistencia  lingüística. Julio Cesar al [phone].    We comply with applicable federal civil rights laws and [place] laws. We do not discriminate on the basis of race, color, national origin, age, disability, sex, sexual orientation, or gender identity.            Thank you!     Thank you for choosing [facility]  for your care. Our goal is always to provide you with excellent care. Hearing back from our patients is one way we can continue to improve our services. Please take a few minutes to complete the written survey that you may receive in the mail after your visit with us. Thank you!             Your Updated Medication List - Protect others around you: Learn how to safely use, store and throw away your medicines at www.disposemymeds.org.          This list is accurate as of 3/20/18  8:13 AM.  Always use your most recent med list.                   Brand Name Dispense Instructions for use Diagnosis    amLODIPine 5 MG tablet    NORVASC    30 tablet    Take 1 tablet (5 mg) by mouth At Bedtime (to replace lisinopril)    Essential hypertension       FISH OIL + D3 PO      Take 3 capsules by mouth daily        MAGNESIUM OXIDE PO      Take 500 mg by mouth daily        ZYRTEC ALLERGY 10 MG Caps   Generic drug:  cetirizine HCl      Take by mouth daily

## 2018-03-20 NOTE — PROGRESS NOTES
SUBJECTIVE/OBJECTIVE:                Yakov Kahn is a 57 year old male coming in for a follow-up visit for Medication Therapy Management.  He was referred to me from  .     Chief Complaint: Follow up from our visit on 2/27/2018.  Hypertension    Tobacco: No tobacco use  Alcohol: 1-3 beverages / week  Activity: Treadmill 30 minutes, 2.7 miles/hour; three times a week  Shoe shoeing with dog  He works in air conditioning and heating  PMH: Reviewed in Epic Shoulder surgery and seeing PT    Personal Healthcare Goals: He would not like to take medications for his blood pressure    Medication Adherence/Access:   Per patient, misses medication, he reports taking 12 in the last 3 weeks and has 18 pills that he has at home.     Hypertension: Current medications include amlodipine 5mg at night.  Patient does self-monitor BP. He has not been checking home BP monitoring very much but reports continue to be in range of 138's systolic over 84-87's diastolic. Patient reports the following medication side effects:  None  Hx: coughing with lisinopril  He wants to control blood pressure by diet and exercise and weight loss.    Wt Readings from Last 4 Encounters:   03/20/18 209 lb 8 oz (95 kg)   02/27/18 207 lb 8 oz (94.1 kg)   02/08/18 209 lb 8 oz (95 kg)   11/07/17 207 lb 1.6 oz (93.9 kg)       Current labs include:  Today's Vitals: /87  Pulse 98  Wt 209 lb 8 oz (95 kg)  BMI 31.85 kg/m2  BP Readings from Last 3 Encounters:   02/27/18 142/89   02/08/18 148/84   02/02/18 138/84     Lab Results   Component Value Date    CHOL 262 07/28/2017     Lab Results   Component Value Date    TRIG 123 07/28/2017     Lab Results   Component Value Date    HDL 99 07/28/2017     Lab Results   Component Value Date     07/28/2017       Last Basic Metabolic Panel:  Lab Results   Component Value Date     01/25/2018      Lab Results   Component Value Date    POTASSIUM 4.4 01/25/2018     Lab Results   Component Value Date     CHLORIDE 105 01/25/2018     Lab Results   Component Value Date    BUN 11 01/25/2018     Lab Results   Component Value Date    CR 0.94 01/25/2018     GFR Estimate   Date Value Ref Range Status   01/25/2018 82 >60 mL/min/1.7m2 Final     Comment:     Non  GFR Calc   11/07/2017 80 >60 mL/min/1.7m2 Final     Comment:     Non  GFR Calc   07/28/2017 >90  Non  GFR Calc   >60 mL/min/1.7m2 Final     GFR Estimate If Black   Date Value Ref Range Status   01/25/2018 >90 >60 mL/min/1.7m2 Final     Comment:      GFR Calc   11/07/2017 >90 >60 mL/min/1.7m2 Final     Comment:      GFR Calc   07/28/2017 >90   GFR Calc   >60 mL/min/1.7m2 Final     No results found for: TSH]    Most Recent Immunizations   Administered Date(s) Administered     TDAP Vaccine (Adacel) 04/07/2015       ASSESSMENT:              Current medications were reviewed today as discussed above.      Medication Adherence: poor, advised use of pill boxes to help med adherence    Hypertension: Needs Improvement. Patient is not meeting BP goal of < 130/80mmHg.  Pt would benefit from the following changes - taking amlodipine every day, continued BP monitoring and sodium restriction    PLAN:                  Pill box given today for patient to remember to take amlodipine every day    Next MTM/pharmacist visit: 1 month    I spent 15 minutes with this patient today. All changes were made via collaborative practice agreement with Humberto Vora. A copy of the visit note was provided to the patient's primary care provider.     The patient was given a summary of these recommendations as an after visit summary.    Angelica Gibbs, PharmD [facility]  Medication Therapy Management Practitioner   #[phone]

## 2018-03-23 ENCOUNTER — THERAPY VISIT (OUTPATIENT)
Dept: PHYSICAL THERAPY | Facility: CLINIC | Age: 58
End: 2018-03-23
Payer: COMMERCIAL

## 2018-03-23 DIAGNOSIS — Z98.890 S/P LEFT ROTATOR CUFF REPAIR: ICD-10-CM

## 2018-03-23 PROCEDURE — 97110 THERAPEUTIC EXERCISES: CPT | Mod: GP | Performed by: PHYSICAL THERAPIST

## 2018-03-23 PROCEDURE — 97112 NEUROMUSCULAR REEDUCATION: CPT | Mod: GP | Performed by: PHYSICAL THERAPIST

## 2018-04-04 DIAGNOSIS — I10 ESSENTIAL HYPERTENSION: ICD-10-CM

## 2018-04-04 RX ORDER — AMLODIPINE BESYLATE 5 MG/1
5 TABLET ORAL AT BEDTIME
Qty: 30 TABLET | Refills: 2 | Status: CANCELLED | OUTPATIENT
Start: 2018-04-04

## 2018-04-04 NOTE — TELEPHONE ENCOUNTER
Unsure what is needed for this pt.  Does he need a nurse only BP or an OV? Please advise.  Marquise Carcamo CMA ([facility])

## 2018-04-04 NOTE — TELEPHONE ENCOUNTER
Amlodipine    Last Written Prescription Date: 2/27/18  Last Fill Quantity: 30,   # refills: 2  Last Office Visit: 2/8/18     Next 5 appointments (look out 90 days)     May 01, 2018  8:00 AM CDT   SHORT with Angelica Gibbs [facility] ([facility])    [address] [facility] [address]   [phone]

## 2018-04-30 ENCOUNTER — THERAPY VISIT (OUTPATIENT)
Dept: PHYSICAL THERAPY | Facility: CLINIC | Age: 58
End: 2018-04-30
Payer: COMMERCIAL

## 2018-04-30 DIAGNOSIS — Z98.890 S/P LEFT ROTATOR CUFF REPAIR: ICD-10-CM

## 2018-04-30 PROCEDURE — 97112 NEUROMUSCULAR REEDUCATION: CPT | Mod: GP | Performed by: PHYSICAL THERAPIST

## 2018-04-30 PROCEDURE — 97110 THERAPEUTIC EXERCISES: CPT | Mod: GP | Performed by: PHYSICAL THERAPIST

## 2018-04-30 NOTE — PROGRESS NOTES
Subjective:  HPI                    Objective:  System    Physical Exam    General     ROS    Assessment/Plan:    DISCHARGE REPORT    Progress reporting period is from 1/5/2018 to 4/30/2018.     SUBJECTIVE    Subjective: Saw MD last week and thought he was doing well. Sleeping well. Patient reports returning to all daily activities, including washing windows and yardwork without difficulty or pain.        Initial Pain level: 0/10    Current level of pain: 0/10       OBJECTIVE    Objective: Left shoulder ROM: WNL except slight loss of internal and external rotation. Strength left shoulder: 5/5 except external rotation=5-/5      ASSESSMENT/PLAN    STG/LTGs have been met or progress has been made towards goals:  Yes (See Goal flow sheet completed today.)  Assessment of Progress: The patient's condition is improving.  The patient's condition has potential to improve.  Self Management Plans:  Patient is independent in a home treatment program.  Patient is independent in self management of symptoms.    Yakov continues to require the following intervention to meet STG and LTG's: PT intervention is no longer required to meet STG/LTG.      Recommendations:    This patient is ready to be discharged from therapy and continue their home treatment program.    Please refer to the daily flowsheet for treatment today, total treatment time and time spent performing 1:1 timed codes.

## 2018-04-30 NOTE — MR AVS SNAPSHOT
After Visit Summary   4/30/2018    Yakov Kahn    MRN: [id number]           Patient Information     Date Of Birth          1960        Visit Information        Provider Department      4/30/2018 8:30 AM Rosalinda Nicholson PT [facility] Rosemarie [facility]        Today's Diagnoses     S/P left rotator cuff repair           Follow-ups after your visit        Your next 10 appointments already scheduled     May 01, 2018  8:00 AM CDT   SHORT with Angelica Gibbs [facility] ([facility])    [address] [address] [phone]              Who to contact     If you have questions or need follow up information about today's clinic visit or your schedule please contact [facility] ROSEMARIE SHIPMAN directly at [phone].  Normal or non-critical lab and imaging results will be communicated to you by MyChart, letter or phone within 4 business days after the clinic has received the results. If you do not hear from us within 7 days, please contact the clinic through [employer] or phone. If you have a critical or abnormal lab result, we will notify you by phone as soon as possible.  Submit refill requests through [facility] or call your pharmacy and they will forward the refill request to us. Please allow 3 business days for your refill to be completed.          Additional Information About Your Visit        MyChart Information     [facility] gives you secure access to your electronic health record. If you see a primary care provider, you can also send messages to your care team and make appointments. If you have questions, please call your primary care clinic.  If you do not have a primary care provider, please call [phone] and they will assist you.        Care EveryWhere ID     This is your Care EveryWhere ID. This could be used by other organizations to access your [place] medical records  [id number]          Blood Pressure from Last 3 Encounters:   03/20/18 142/87   02/27/18 142/89   02/08/18 148/84    Weight from Last 3 Encounters:   03/20/18 95 kg (209 lb 8 oz)   02/27/18 94.1 kg (207 lb 8 oz)   02/08/18 95 kg (209 lb 8 oz)              We Performed the Following     LINDA PROGRESS NOTES REPORT     NEUROMUSCULAR RE-EDUCATION     THERAPEUTIC EXERCISES        Primary Care Provider Office Phone # Fax #    Humberto Vora PA-C [phone] [address]  [facility] [address]        Equal Access to Services     [facility]: Hadii aad ku hadasho Soomaali, waaxda luqadaha, qaybta kaalmada adeegyada, bernadine don hayaan bethany plasencia . So [facility] [phone].    ATENCIÓN: Si habla español, tiene a parrish disposición servicios gratuitos de asistencia lingüística. [facility] [phone].    We comply with applicable federal civil rights laws and [place] laws. We do not discriminate on the basis of race, color, national origin, age, disability, sex, sexual orientation, or gender identity.            Thank you!     Thank you for choosing [facility]  for your care. Our goal is always to provide you with excellent care. Hearing back from our patients is one way we can continue to improve our services. Please take a few minutes to complete the written survey that you may receive in the mail after your visit with us. Thank you!             Your Updated Medication List - Protect others around you: Learn how to safely use, store and throw away your medicines at www.disposemymeds.org.          This list is accurate as of 4/30/18  9:04 AM.  Always use your most recent med list.                   Brand Name Dispense Instructions for use Diagnosis    amLODIPine 5 MG tablet    NORVASC    30 tablet    Take 1 tablet (5 mg) by mouth At Bedtime (to replace lisinopril)    Essential hypertension       FISH OIL + D3 PO      Take 3 capsules by mouth daily        MAGNESIUM OXIDE  PO      Take 500 mg by mouth daily        ZYRTEC ALLERGY 10 MG Caps   Generic drug:  cetirizine HCl      Take by mouth daily

## 2018-04-30 NOTE — LETTER
[facility] PT  [address] [facility] [id number] [phone]    2018    Re: Yakov Kahn   :   1960  MRN:  [id number]   REFERRING PHYSICIAN:   Ricardo Poe  [facility] ROSEMARIE PT  Date of Initial Evaluation:  12/15/2017  Visits:  Rxs Used: 16  Reason for Referral:  S/P left rotator cuff repair    DISCHARGE REPORT  Progress reporting period is from 2018 to 2018.     SUBJECTIVE    Subjective: Saw MD last week and thought he was doing well. Sleeping well. Patient reports returning to all daily activities, including washing windows and yardwork without difficulty or pain.      Initial Pain level: 0/10    Current level of pain: 0/10     OBJECTIVE  Objective: Left shoulder ROM: WNL except slight loss of internal and external rotation. Strength left shoulder: 5/5 except external rotation=5-/5      ASSESSMENT/PLAN  STG/LTGs have been met or progress has been made towards goals:  Yes (See Goal flow sheet completed today.)  Assessment of Progress: The patient's condition is improving.  The patient's condition has potential to improve.  Self Management Plans:  Patient is independent in a home treatment program.  Patient is independent in self management of symptoms.  Yakov continues to require the following intervention to meet STG and LTG's: PT intervention is no longer required to meet STG/LTG.    Recommendations:  This patient is ready to be discharged from therapy and continue their home treatment program.      Thank you for your referral.    INQUIRIES  Therapist: Rosalinda Nicholson PT  [facility]  [address]  [facility] [address]  Phone: [phone]  Fax: [phone]

## 2018-06-25 ENCOUNTER — TELEPHONE (OUTPATIENT)
Dept: FAMILY MEDICINE | Facility: CLINIC | Age: 58
End: 2018-06-25

## 2018-06-25 NOTE — TELEPHONE ENCOUNTER
Type of outreach:  Sent thesixtyone message.  Health Maintenance Due   Topic Date Due     HIV SCREEN (SYSTEM ASSIGNED)  10/12/1978     ADVANCE DIRECTIVE PLANNING Q5 YRS  10/12/2015     Patient is due for a BP F/U  Preethi Morgan MA

## 2018-07-02 NOTE — TELEPHONE ENCOUNTER
2nd attempt to contact, LM for patient to return call to schedule appointment.   Preethi Morgan MA

## 2018-10-02 ENCOUNTER — TELEPHONE (OUTPATIENT)
Dept: FAMILY MEDICINE | Facility: CLINIC | Age: 58
End: 2018-10-02

## 2018-10-02 NOTE — TELEPHONE ENCOUNTER
Type of outreach:  None  Health Maintenance Due   Topic Date Due     HIV SCREEN (SYSTEM ASSIGNED)  10/12/1978     ADVANCE DIRECTIVE PLANNING Q5 YRS  10/12/2015     FIT Q1 YR  07/28/2018     PHQ-2 Q1 YR  07/28/2018     INFLUENZA VACCINE (1) 09/01/2018     Contact attempted within last 3 months, letter sent 8/2018 for MTM appt, can follow up on BP at this visit if scheduled.   Preethi Morgan MA

## 2018-10-09 ENCOUNTER — TELEPHONE (OUTPATIENT)
Dept: PHARMACY | Facility: CLINIC | Age: 58
End: 2018-10-09

## 2018-10-09 NOTE — LETTER
October 25, 2018      Yakov Kahn  [address] [place] [address]        Dear Christiano,     You are due for an appointment with your primary provider to follow up on your blood pressure. Please contact the clinic at [phone] at your earliest convenience to schedule this appointment.     Sincerely,     Humberto Vora PA-C

## 2018-10-09 NOTE — TELEPHONE ENCOUNTER
We have attempted to contact this patient three times to set up a MTM follow up appointment and were unsuccessful.  We will no longer continue to contact this patient to schedule a visit at this time. Please refer back to MTM if you believe this patient would continue to benefit from our services.     Thank you!    Angelica Gibbs, PharmD [facility]  Medication Therapy Management Practitioner   #[phone]

## 2018-11-01 ENCOUNTER — TELEPHONE (OUTPATIENT)
Dept: PHARMACY | Facility: CLINIC | Age: 58
End: 2018-11-01

## 2018-11-01 NOTE — TELEPHONE ENCOUNTER
Please call patient to have his blood pressure checked and to follow up with Олег Vora, provider for failing blood pressure.

## 2018-12-18 ENCOUNTER — ALLIED HEALTH/NURSE VISIT (OUTPATIENT)
Dept: FAMILY MEDICINE | Facility: CLINIC | Age: 58
End: 2018-12-18

## 2018-12-18 VITALS — DIASTOLIC BLOOD PRESSURE: 84 MMHG | SYSTOLIC BLOOD PRESSURE: 138 MMHG

## 2018-12-18 DIAGNOSIS — Z01.30 BP CHECK: Primary | ICD-10-CM

## 2018-12-18 PROCEDURE — 99207 ZZC NO CHARGE NURSE ONLY: CPT | Performed by: PHYSICIAN ASSISTANT

## 2018-12-18 NOTE — PROGRESS NOTES
Yakov Kahn is enrolled/participating in the retail pharmacy Blood Pressure Goals Achievement Program (BPGAP).  Yakov Kahn was evaluated at [facility] on December 18, 2018 at which time his blood pressure was:    BP Readings from Last 3 Encounters:   12/18/18 138/84   03/20/18 142/87   02/27/18 142/89     Reviewed lifestyle modifications for blood pressure control and reduction: including making healthy food choices, managing weight, getting regular exercise, smoking cessation, reducing alcohol consumption, monitoring blood pressure regularly.     Yakov Kahn is not experiencing symptoms.    Follow-Up: BP is at goal of < 140/90mmHg (patient 18+ years of age with or without diabetes).  Recommended follow-up at pharmacy in 6 months.     Recommendation to Provider: none     Yakov Kahn was evaluated for enrollment into the PGEN study today.    PLEASE INITIATE ENROLLMENT DISCUSSION WITH HTN PTS  1) Between 30-80 years old                                                                                                               2) BMI between 19-50                                                                                                        3) BP ?140/90 AND ?170/110 patients aged 30-59         BP ?150/90 AND ?170/110 non-diabetic patients aged 60-80       BP ?140/90 AND ?170/110 diabetic patients aged 60-80  4) Additional requirements for uncontrolled HTN patients:        Pt on only 1 class of medication  5) EXCLUDE patient if confirmation of:                  ? Cardiac disease                  ? Chronic Kidney Disease                  ? Pregnancy/Breastfeeding                  ? Secondary Hypertension/Pre-eclampsia                                 ? Vascular disease    Patient eligible for enrollment:  No  Patient interested in enrollment:  No    This note completed by: Guicho Fong    Thank You   [facility]

## 2019-03-14 ENCOUNTER — MYC MEDICAL ADVICE (OUTPATIENT)
Dept: FAMILY MEDICINE | Facility: CLINIC | Age: 59
End: 2019-03-14

## 2019-03-14 ENCOUNTER — E-VISIT (OUTPATIENT)
Dept: FAMILY MEDICINE | Facility: CLINIC | Age: 59
End: 2019-03-14

## 2019-03-14 DIAGNOSIS — Z20.828 EXPOSURE TO INFLUENZA: Primary | ICD-10-CM

## 2019-03-14 PROCEDURE — 99444 ZZC PHYSICIAN ONLINE EVALUATION & MANAGEMENT SERVICE: CPT | Performed by: PHYSICIAN ASSISTANT

## 2019-03-14 RX ORDER — OSELTAMIVIR PHOSPHATE 75 MG/1
75 CAPSULE ORAL DAILY
Qty: 10 CAPSULE | Refills: 0 | Status: SHIPPED | OUTPATIENT
Start: 2019-03-14 | End: 2019-03-24

## 2019-03-14 NOTE — TELEPHONE ENCOUNTER
Spoke with patient and explained needed to use the [employer] application for an e-visit. Explained the charge. Patient will submit e-visit for the request.    Wife had messaged that patient was exposed to flu as she was tested positive. Patient is asymptomatic at this time. Would like preventive dose of Tamiflu as he visits his father frequently who is a cancer patient.    Veronica Davis RN

## 2019-03-14 NOTE — TELEPHONE ENCOUNTER
Patient called clinic stating all the info in his MC message, was hoping that calling in would be faster than a MC message. Patient would like to use the [facility] pharmacy if rx is written. Please call patient if rx can be written, [phone].

## 2020-02-16 ENCOUNTER — HEALTH MAINTENANCE LETTER (OUTPATIENT)
Age: 60
End: 2020-02-16

## 2020-11-22 ENCOUNTER — HEALTH MAINTENANCE LETTER (OUTPATIENT)
Age: 60
End: 2020-11-22

## 2021-04-04 ENCOUNTER — HEALTH MAINTENANCE LETTER (OUTPATIENT)
Age: 61
End: 2021-04-04

## 2021-05-16 ENCOUNTER — APPOINTMENT (OUTPATIENT)
Dept: CT IMAGING | Facility: CLINIC | Age: 61
DRG: 812 | End: 2021-05-16
Attending: PHYSICIAN ASSISTANT
Payer: COMMERCIAL

## 2021-05-16 ENCOUNTER — HOSPITAL ENCOUNTER (INPATIENT)
Facility: CLINIC | Age: 61
LOS: 4 days | Discharge: HOME | DRG: 812 | End: 2021-05-20
Attending: PHYSICIAN ASSISTANT | Admitting: INTERNAL MEDICINE
Payer: COMMERCIAL

## 2021-05-16 DIAGNOSIS — D62 ANEMIA DUE TO BLOOD LOSS, ACUTE: ICD-10-CM

## 2021-05-16 DIAGNOSIS — K92.2 UPPER GI BLEED: ICD-10-CM

## 2021-05-16 DIAGNOSIS — K70.30 ALCOHOLIC CIRRHOSIS OF LIVER WITHOUT ASCITES (H): Primary | ICD-10-CM

## 2021-05-16 DIAGNOSIS — K74.60 CIRRHOSIS OF LIVER (H): ICD-10-CM

## 2021-05-16 LAB
ALBUMIN SERPL-MCNC: 2.6 G/DL (ref 3.4–5)
ALBUMIN UR-MCNC: NEGATIVE MG/DL
ALP SERPL-CCNC: 123 U/L (ref 40–150)
ALT SERPL W P-5'-P-CCNC: 67 U/L (ref 0–70)
AMMONIA PLAS-SCNC: 40 UMOL/L (ref 10–50)
ANION GAP SERPL CALCULATED.3IONS-SCNC: 13 MMOL/L (ref 3–14)
ANISOCYTOSIS BLD QL SMEAR: ABNORMAL
APPEARANCE UR: CLEAR
AST SERPL W P-5'-P-CCNC: 67 U/L (ref 0–45)
BASOPHILS # BLD AUTO: 0 10E9/L (ref 0–0.2)
BASOPHILS NFR BLD AUTO: 0.1 %
BILIRUB DIRECT SERPL-MCNC: 1.2 MG/DL (ref 0–0.2)
BILIRUB SERPL-MCNC: 1.9 MG/DL (ref 0.2–1.3)
BILIRUB UR QL STRIP: NEGATIVE
BLD PROD TYP BPU: NORMAL
BLD UNIT ID BPU: 0
BLOOD PRODUCT CODE: NORMAL
BPU ID: NORMAL
BUN SERPL-MCNC: 36 MG/DL (ref 7–30)
CALCIUM SERPL-MCNC: 8.1 MG/DL (ref 8.5–10.1)
CHLORIDE SERPL-SCNC: 101 MMOL/L (ref 94–109)
CO2 SERPL-SCNC: 18 MMOL/L (ref 20–32)
COLOR UR AUTO: YELLOW
CREAT SERPL-MCNC: 1.06 MG/DL (ref 0.66–1.25)
DIFFERENTIAL METHOD BLD: ABNORMAL
EOSINOPHIL # BLD AUTO: 0 10E9/L (ref 0–0.7)
EOSINOPHIL NFR BLD AUTO: 0.1 %
ERYTHROCYTE [DISTWIDTH] IN BLOOD BY AUTOMATED COUNT: 24.8 % (ref 10–15)
ETHANOL SERPL-MCNC: <0.01 G/DL
GFR SERPL CREATININE-BSD FRML MDRD: 76 ML/MIN/{1.73_M2}
GLUCOSE BLDC GLUCOMTR-MCNC: 137 MG/DL (ref 70–99)
GLUCOSE SERPL-MCNC: 244 MG/DL (ref 70–99)
GLUCOSE UR STRIP-MCNC: NEGATIVE MG/DL
HCT VFR BLD AUTO: 11 % (ref 40–53)
HGB BLD-MCNC: 3 G/DL (ref 13.3–17.7)
HGB BLD-MCNC: 5.3 G/DL (ref 13.3–17.7)
HGB UR QL STRIP: NEGATIVE
HYALINE CASTS #/AREA URNS LPF: 1 /LPF (ref 0–2)
HYPOCHROMIA BLD QL: PRESENT
IMM GRANULOCYTES # BLD: 0.1 10E9/L (ref 0–0.4)
IMM GRANULOCYTES NFR BLD: 0.8 %
INR PPP: 1.5 (ref 0.86–1.14)
KETONES UR STRIP-MCNC: NEGATIVE MG/DL
LABORATORY COMMENT REPORT: NORMAL
LEUKOCYTE ESTERASE UR QL STRIP: NEGATIVE
LIPASE SERPL-CCNC: 517 U/L (ref 73–393)
LYMPHOCYTES # BLD AUTO: 0.9 10E9/L (ref 0.8–5.3)
LYMPHOCYTES NFR BLD AUTO: 5 %
MCH RBC QN AUTO: 19.1 PG (ref 26.5–33)
MCHC RBC AUTO-ENTMCNC: 27.3 G/DL (ref 31.5–36.5)
MCV RBC AUTO: 70 FL (ref 78–100)
MONOCYTES # BLD AUTO: 1.9 10E9/L (ref 0–1.3)
MONOCYTES NFR BLD AUTO: 11 %
NEUTROPHILS # BLD AUTO: 14.3 10E9/L (ref 1.6–8.3)
NEUTROPHILS NFR BLD AUTO: 83 %
NITRATE UR QL: NEGATIVE
NRBC # BLD AUTO: 0.1 10*3/UL
NRBC BLD AUTO-RTO: 0 /100
PH UR STRIP: 5.5 PH (ref 5–7)
PLATELET # BLD AUTO: 274 10E9/L (ref 150–450)
PLATELET # BLD EST: ABNORMAL 10*3/UL
POLYCHROMASIA BLD QL SMEAR: SLIGHT
POTASSIUM SERPL-SCNC: 3.7 MMOL/L (ref 3.4–5.3)
PROT SERPL-MCNC: 5.6 G/DL (ref 6.8–8.8)
RBC # BLD AUTO: 1.57 10E12/L (ref 4.4–5.9)
RBC #/AREA URNS AUTO: 0 /HPF (ref 0–2)
SARS-COV-2 RNA RESP QL NAA+PROBE: NEGATIVE
SODIUM SERPL-SCNC: 132 MMOL/L (ref 133–144)
SOURCE: NORMAL
SP GR UR STRIP: 1.01 (ref 1–1.03)
SPECIMEN SOURCE: NORMAL
TRANSFUSION STATUS PATIENT QL: NORMAL
UROBILINOGEN UR STRIP-MCNC: NORMAL MG/DL (ref 0–2)
WBC # BLD AUTO: 17.2 10E9/L (ref 4–11)
WBC #/AREA URNS AUTO: 1 /HPF (ref 0–5)

## 2021-05-16 PROCEDURE — 250N000011 HC RX IP 250 OP 636: Performed by: EMERGENCY MEDICINE

## 2021-05-16 PROCEDURE — P9016 RBC LEUKOCYTES REDUCED: HCPCS | Performed by: PHYSICIAN ASSISTANT

## 2021-05-16 PROCEDURE — 86850 RBC ANTIBODY SCREEN: CPT | Performed by: PHYSICIAN ASSISTANT

## 2021-05-16 PROCEDURE — 250N000011 HC RX IP 250 OP 636: Performed by: INTERNAL MEDICINE

## 2021-05-16 PROCEDURE — 200N000001 HC R&B ICU

## 2021-05-16 PROCEDURE — 82077 ASSAY SPEC XCP UR&BREATH IA: CPT | Performed by: PHYSICIAN ASSISTANT

## 2021-05-16 PROCEDURE — 96366 THER/PROPH/DIAG IV INF ADDON: CPT

## 2021-05-16 PROCEDURE — 80076 HEPATIC FUNCTION PANEL: CPT | Performed by: PHYSICIAN ASSISTANT

## 2021-05-16 PROCEDURE — C9113 INJ PANTOPRAZOLE SODIUM, VIA: HCPCS | Performed by: INTERNAL MEDICINE

## 2021-05-16 PROCEDURE — 81001 URINALYSIS AUTO W/SCOPE: CPT | Performed by: PHYSICIAN ASSISTANT

## 2021-05-16 PROCEDURE — 80048 BASIC METABOLIC PNL TOTAL CA: CPT | Performed by: PHYSICIAN ASSISTANT

## 2021-05-16 PROCEDURE — 83690 ASSAY OF LIPASE: CPT | Performed by: PHYSICIAN ASSISTANT

## 2021-05-16 PROCEDURE — 99292 CRITICAL CARE ADDL 30 MIN: CPT

## 2021-05-16 PROCEDURE — 999N001017 HC STATISTIC GLUCOSE BY METER IP

## 2021-05-16 PROCEDURE — 96376 TX/PRO/DX INJ SAME DRUG ADON: CPT

## 2021-05-16 PROCEDURE — 36430 TRANSFUSION BLD/BLD COMPNT: CPT

## 2021-05-16 PROCEDURE — 82140 ASSAY OF AMMONIA: CPT | Performed by: PHYSICIAN ASSISTANT

## 2021-05-16 PROCEDURE — 250N000009 HC RX 250: Performed by: PHYSICIAN ASSISTANT

## 2021-05-16 PROCEDURE — 82728 ASSAY OF FERRITIN: CPT | Performed by: PHYSICIAN ASSISTANT

## 2021-05-16 PROCEDURE — 86901 BLOOD TYPING SEROLOGIC RH(D): CPT | Performed by: PHYSICIAN ASSISTANT

## 2021-05-16 PROCEDURE — 258N000003 HC RX IP 258 OP 636: Performed by: EMERGENCY MEDICINE

## 2021-05-16 PROCEDURE — 36415 COLL VENOUS BLD VENIPUNCTURE: CPT | Performed by: PHYSICIAN ASSISTANT

## 2021-05-16 PROCEDURE — 258N000003 HC RX IP 258 OP 636: Performed by: INTERNAL MEDICINE

## 2021-05-16 PROCEDURE — 250N000011 HC RX IP 250 OP 636: Performed by: PHYSICIAN ASSISTANT

## 2021-05-16 PROCEDURE — 83550 IRON BINDING TEST: CPT | Performed by: PHYSICIAN ASSISTANT

## 2021-05-16 PROCEDURE — 96368 THER/DIAG CONCURRENT INF: CPT

## 2021-05-16 PROCEDURE — 85018 HEMOGLOBIN: CPT | Performed by: PHYSICIAN ASSISTANT

## 2021-05-16 PROCEDURE — 85610 PROTHROMBIN TIME: CPT | Performed by: PHYSICIAN ASSISTANT

## 2021-05-16 PROCEDURE — 86923 COMPATIBILITY TEST ELECTRIC: CPT | Performed by: PHYSICIAN ASSISTANT

## 2021-05-16 PROCEDURE — 99223 1ST HOSP IP/OBS HIGH 75: CPT | Mod: AI | Performed by: INTERNAL MEDICINE

## 2021-05-16 PROCEDURE — 85025 COMPLETE CBC W/AUTO DIFF WBC: CPT | Performed by: PHYSICIAN ASSISTANT

## 2021-05-16 PROCEDURE — C9113 INJ PANTOPRAZOLE SODIUM, VIA: HCPCS | Performed by: PHYSICIAN ASSISTANT

## 2021-05-16 PROCEDURE — 96365 THER/PROPH/DIAG IV INF INIT: CPT | Mod: 59

## 2021-05-16 PROCEDURE — 74177 CT ABD & PELVIS W/CONTRAST: CPT

## 2021-05-16 PROCEDURE — 83540 ASSAY OF IRON: CPT | Performed by: PHYSICIAN ASSISTANT

## 2021-05-16 PROCEDURE — 87635 SARS-COV-2 COVID-19 AMP PRB: CPT | Performed by: PHYSICIAN ASSISTANT

## 2021-05-16 PROCEDURE — 86900 BLOOD TYPING SEROLOGIC ABO: CPT | Performed by: PHYSICIAN ASSISTANT

## 2021-05-16 PROCEDURE — 99291 CRITICAL CARE FIRST HOUR: CPT | Mod: 25

## 2021-05-16 PROCEDURE — 258N000003 HC RX IP 258 OP 636: Performed by: PHYSICIAN ASSISTANT

## 2021-05-16 RX ORDER — CEFTRIAXONE 2 G/1
2 INJECTION, POWDER, FOR SOLUTION INTRAMUSCULAR; INTRAVENOUS ONCE
Status: DISCONTINUED | OUTPATIENT
Start: 2021-05-16 | End: 2021-05-16

## 2021-05-16 RX ORDER — LEVOCETIRIZINE DIHYDROCHLORIDE 5 MG/1
5 TABLET, FILM COATED ORAL EVERY EVENING
COMMUNITY
End: 2024-03-05

## 2021-05-16 RX ORDER — IOPAMIDOL 755 MG/ML
500 INJECTION, SOLUTION INTRAVASCULAR ONCE
Status: COMPLETED | OUTPATIENT
Start: 2021-05-16 | End: 2021-05-16

## 2021-05-16 RX ORDER — DEXTROSE MONOHYDRATE 25 G/50ML
25-50 INJECTION, SOLUTION INTRAVENOUS
Status: DISCONTINUED | OUTPATIENT
Start: 2021-05-16 | End: 2021-05-20 | Stop reason: HOSPADM

## 2021-05-16 RX ORDER — NICOTINE POLACRILEX 4 MG
15-30 LOZENGE BUCCAL
Status: DISCONTINUED | OUTPATIENT
Start: 2021-05-16 | End: 2021-05-20 | Stop reason: HOSPADM

## 2021-05-16 RX ORDER — CEFTRIAXONE 2 G/1
2 INJECTION, POWDER, FOR SOLUTION INTRAMUSCULAR; INTRAVENOUS EVERY 24 HOURS
Status: DISCONTINUED | OUTPATIENT
Start: 2021-05-16 | End: 2021-05-17

## 2021-05-16 RX ADMIN — SODIUM CHLORIDE 8 MG/HR: 9 INJECTION, SOLUTION INTRAVENOUS at 23:09

## 2021-05-16 RX ADMIN — PANTOPRAZOLE SODIUM 40 MG: 40 INJECTION, POWDER, FOR SOLUTION INTRAVENOUS at 13:31

## 2021-05-16 RX ADMIN — SODIUM CHLORIDE 64 ML: 9 INJECTION, SOLUTION INTRAVENOUS at 13:42

## 2021-05-16 RX ADMIN — TAZOBACTAM SODIUM AND PIPERACILLIN SODIUM 4.5 G: 500; 4 INJECTION, SOLUTION INTRAVENOUS at 15:31

## 2021-05-16 RX ADMIN — CEFTRIAXONE 2 G: 2 INJECTION, POWDER, FOR SOLUTION INTRAMUSCULAR; INTRAVENOUS at 23:46

## 2021-05-16 RX ADMIN — SODIUM CHLORIDE 8 MG/HR: 9 INJECTION, SOLUTION INTRAVENOUS at 16:03

## 2021-05-16 RX ADMIN — IOPAMIDOL 95 ML: 755 INJECTION, SOLUTION INTRAVENOUS at 13:42

## 2021-05-16 RX ADMIN — PANTOPRAZOLE SODIUM 40 MG: 40 INJECTION, POWDER, FOR SOLUTION INTRAVENOUS at 13:58

## 2021-05-16 RX ADMIN — OCTREOTIDE ACETATE 50 MCG/HR: 200 INJECTION, SOLUTION INTRAVENOUS; SUBCUTANEOUS at 14:12

## 2021-05-16 ASSESSMENT — ENCOUNTER SYMPTOMS
CONSTIPATION: 1
BLOOD IN STOOL: 1
ABDOMINAL PAIN: 0
WEAKNESS: 1
NAUSEA: 0
ABDOMINAL DISTENTION: 1
FEVER: 0
VOMITING: 0
FATIGUE: 1

## 2021-05-16 ASSESSMENT — MIFFLIN-ST. JEOR
SCORE: 1659.94
SCORE: 1674.5

## 2021-05-16 NOTE — ED PROVIDER NOTES
Emergency Department Attending Supervision Note  5/16/2021  1:39 PM      I evaluated this patient in conjunction with Moo Andrade PA-C      Briefly, the patient presented with weakness and fatigue. The patient tells us that five days ago he developed difficulty sleeping which persisted and cause increased fatigue. In addition, the patient has been experiencing pressure in his abdomen with decreased bowel movements. However, when the patient is able to produce stool it is blackish in color. The patient does note drinking about 4 cocktails per day but denies history of alcohol withdrawal complications.     On my exam, pallor, ill-appearing, normal respiratory effort, abdomen soft and nontender, 2+ bilat radial pulses, RRR    Results:    Imaging:  Abd/pelvis CT,  IV  contrast only TRAUMA / AAA:  1.  Cirrhotic liver. Portal hypertension evidenced by multiple vascular varices and mild splenomegaly. There is small abdominal and pelvic ascites. Nodularity noted throughout the liver including an example at the inferior right liver. Recommend correlation with nonurgent liver MRI.  2.  Edema of the duodenum and right colon could relate to ascites and liver disease. Cannot exclude an infectious or inflammatory cause of duodenitis and colitis. No obstruction.  3.  Mildly prominent prostate.  4.  Distended gallbladder.  Reading per radiology.      Laboratory:  CBC: WBC 17.2 (H), HGB: 3.0 (LL), PLT: 274    Repeat hemoglobin (post transfusion): Pending     BMP: Na: 132 (L), CO2: 18 (L), Glucose: 244 (H), BUN: 36 (H), Ca: 8.1 (L), o/w WNL (Creatinine: 1.06)     Hepatic Panel: Bilirubin direct: 1.2 (H), Bilirubin total: 1.9 (H), Albumin: 2.6 (L), Protein total: 5.6 (L), AST: 67 (H), o/w WNL     Lipase: 517 (H)     INR: 1.50 (H)    Ammonia: 40    Alcohol level blood: <0.01    UA with microscopic: All Negative    Asymptomatic COVID-19 PCR: Negative    Blood type & screen: B+; antibodies negative    ED course:    MDM    60-year-old  male presenting with generalized weakness, black stools concerning for melena.  Labs revealed profound anemia likely acute to subacute.  Secondary to an upper GI bleed.  Given his history of alcohol use and cirrhosis, patient is treated pantoprazole bolus and infusion as well as octreotide.  PA discussed the patient with gastroenterology as well as the hospital service and the patient was subsequently admitted to the ICU for further evaluation and management.  Patient was counseled results, diagnosis and disposition.  He is understanding and agreeable to plan.  Blood transfusion started in the emergency department.    Critical care time: 35 minutes excluding procedures    Diagnosis    ICD-10-CM    1. Upper GI bleed  K92.2 Blood component   2. Anemia due to blood loss, acute  D62          Moo Andrade PA-C Vaughn, Christopher E, MD  05/17/21 0857

## 2021-05-16 NOTE — H&P
H&P dictated    Admitted for severe microcytic anemia    Hemodynamically stable in the setting of severely reduced hgb at 3.  Suspect sub-acute to chronic UGIB given hemodynamic stability and his tolerance of this degree of anemia.      Plan  - admit to ICU for close monitoring given degree of anemia but doubt acute bleed for reasons mentioned above  - continue PPI gtt  - continue octreotide gtt for now  - Rocephin IV ppx for now in the setting of presumed UGIB and possible cirrhosis with ascites on imaging  - liquid diet today; NPO after midnight  - GI consult for EGD in AM  - hgb q6h  - conditional order for blood tx if hgb<7.  Anticipate he will need at least 4 units to achieve this goal.  Has been given 2 units in ER

## 2021-05-16 NOTE — ED PROVIDER NOTES
"  History   Chief Complaint:  Fatigue    HPI  Yakov Kahn is a 60 year old male who presents for evaluation of generalized weakness and fatigue. Five days ago, he reports developing difficulty sleeping which has persisted and caused him to feel more fatigued than usual. Along with the difficulty sleeping, he reports developing a pressure in his abdomen and decreased stool output. He took some OTC laxatives three days ago which helped with the constipation, though not completely. When he has been able to produce stool this week, he states it has been very strained and the stool itself has been black-stephany. Along with the laxative, he reports trying to push water at home however his symptoms have only persisted. His wife notes concern for how quickly the weakness came on; she states he was able to walk, albeit slowly, well five days ago but yesterday needed the help of a wheelchair to move across any distance. Given the progression, wife brought him to the ED today for evaluation. Here, he reports his abdomen feels \"heavy and wonky\" but denies focal pain. He denies fever, vomiting/hematemesis, or other symptoms. He personally denies history of constipation, GI bleeding, or liver disease. He does report drinking about 4 cocktails per day, but denies history of alcohol withdrawal complications. He also denies excessive use of Tylenol. Wife states this current state is very off his baseline; she reports he is usually quite strong and active, and she finds herself needing to catch up with him when they are walking.     Allergies:  No Known Allergies    Medications:    Levocetirizine     Past Medical History:    Hypertension   Plantar fascial fibromatosis     Past Surgical History:    He denies history of surgery.     Family History:    Brother: Hypertension      Social History:  Presents with his wife, Morelia.   Reports alcohol use. States he drinks 4 cocktails every day.     Review of Systems   Constitutional: Positive " "for fatigue. Negative for fever.   Gastrointestinal: Positive for abdominal distention, blood in stool and constipation. Negative for abdominal pain, nausea and vomiting.   Neurological: Positive for weakness (global).   All other systems reviewed and are negative.    Physical Exam     Patient Vitals for the past 24 hrs:   BP Temp Temp src Pulse Resp SpO2 Height Weight   05/16/21 1700 130/67 -- -- 88 -- 99 % -- --   05/16/21 1614 135/75 98.9  F (37.2  C) Oral -- 16 -- -- --   05/16/21 1600 127/75 98.9  F (37.2  C) Oral -- 16 -- -- --   05/16/21 1545 120/68 98.5  F (36.9  C) Oral 89 16 99 % -- --   05/16/21 1539 128/72 98.8  F (37.1  C) Oral -- 16 -- -- --   05/16/21 1523 134/78 98.5  F (36.9  C) -- 90 16 -- -- --   05/16/21 1500 118/66 98.9  F (37.2  C) Oral 91 16 97 % -- --   05/16/21 1445 120/64 -- -- 93 -- 98 % -- --   05/16/21 1444 123/68 98.6  F (37  C) Oral 94 16 99 % -- --   05/16/21 1430 125/63 -- -- 96 -- 99 % -- --   05/16/21 1415 124/62 98.4  F (36.9  C) Oral 98 16 100 % -- --   05/16/21 1400 124/59 -- -- 108 -- 100 % -- --   05/16/21 1330 127/63 -- -- 102 -- -- -- --   05/16/21 1315 -- -- -- 107 -- -- -- --   05/16/21 1300 98/60 -- -- 105 -- 100 % -- --   05/16/21 1230 138/61 -- -- 109 -- 100 % -- --   05/16/21 1158 128/60 96.9  F (36.1  C) Temporal 111 18 100 % 1.727 m (5' 8\") 87.5 kg (193 lb)        Physical Exam  General: Very pale appearing.  Awake and alert nontoxic.    Head:  Scalp is NC/AT  Eyes:  Marked conjunctival pallor,, PERRL  ENT:  The external nose and ears are normal.   Neck:  Normal range of motion without rigidity.  CV:  Regular rate and rhythm    No pathologic murmur, rubs, or gallops.  Resp:  Breath sounds are clear bilaterally.  No crackles, wheezes, rhonchi, stridor.    Non-labored, no retractions or accessory muscle use  Abdomen: Abdomen is soft, no distension, mild epigastric tenderness, no masses. No CVA tenderness.  Rectal exam demonstrates melena, no brisk bleeding.  MS:  No " lower extremity edema or asymmetric calf swelling.  Skin:  Warm and dry, No rash or lesions noted. 2+ peripheral pulses in all extremities  Neuro: Alert and oriented x3.  No gross motor deficits.  No facial asymmetry.  Psych: Awake. Alert. Normal affect. Appropriate interactions.    Emergency Department Course     Imaging:  Abd/pelvis CT,  IV  contrast only TRAUMA / AAA:  1.  Cirrhotic liver. Portal hypertension evidenced by multiple vascular varices and mild splenomegaly. There is small abdominal and pelvic ascites. Nodularity noted throughout the liver including an example at the inferior right liver. Recommend correlation with nonurgent liver MRI.  2.  Edema of the duodenum and right colon could relate to ascites and liver disease. Cannot exclude an infectious or inflammatory cause of duodenitis and colitis. No obstruction.  3.  Mildly prominent prostate.  4.  Distended gallbladder.  Reading per radiology.      Laboratory:  CBC: WBC 17.2 (H), HGB: 3.0 (LL), PLT: 274    Repeat hemoglobin (post transfusion): Pending on admission    BMP: Na: 132 (L), CO2: 18 (L), Glucose: 244 (H), BUN: 36 (H), Ca: 8.1 (L), o/w WNL (Creatinine: 1.06)     Hepatic Panel: Bilirubin direct: 1.2 (H), Bilirubin total: 1.9 (H), Albumin: 2.6 (L), Protein total: 5.6 (L), AST: 67 (H), o/w WNL     Lipase: 517 (H)     INR: 1.50 (H)    Ammonia: 40    Alcohol level blood: <0.01    UA with microscopic: All Negative    Asymptomatic COVID-19 PCR: Negative    Blood type & screen: B+; antibodies negative    Emergency Department Course:    Reviewed:  I reviewed the patient's nursing notes, vitals, past medical records, and Care Everywhere.     Assessments:  1223: I performed an exam of the patient, as documented above. History obtained and plan for ED work up discussed as well.   1317: I reassessed the patient and discussed the results of the work up. Chaperoned rectal exam performed with gross melena noted.   1338: I rechecked the patient, updating him  on the hemoglobin level; he was consented for a blood transfusion.   1600: I reassessed the patient and discussed the plan for admission at this time.     Consults:   1336: Lab called the RN to report the critically low hemoglobin; they think the value is around 3.0 and recommend a complete re-draw of the labs at this time to rule out contamination.    1340: I spoke with my ED colleague, Dr. Reich, to share service on the patient. Please see his note for details.   1410: I consulted with Dr. Alford, on call for [facility], regarding the patient's history and presentation here in the emergency department.    1515: I spoke with Dr. Alford after his ED evaluation of the patient regarding recommendations for care.   1556: I consulted with Dr. Mark of the hospitalist services. He is in agreement to accept the patient for admission.    Interventions:  1331: Protonix, 40 mg, IV push injection   1358: Protonix, 40 mg, IV push injection   1412: octreotide, 50 mcg/hr, IV infusion  1415: 1 unit PRBCs, IV   1531: Zosyn, 4.5 g, IV infusion  1603: Protonix, 8 mg/hr, IV drip   1608: 1 unit PRBCs, IV     Disposition:  The patient was admitted to the hospital under the care of Dr. Mark.     Impression & Plan      Covid-19  Yakov Kahn was evaluated during a global COVID-19 pandemic, which necessitated consideration that the patient might be at risk for infection with the SARS-CoV-2 virus that causes COVID-19.   Applicable protocols for evaluation were followed during the patient's care.   COVID-19 was considered as part of the patient's evaluation. The plan for testing is:  a test was obtained during this visit.    Medical Decision Making:   Yakov Kahn is a 60 year old male who presents with weakness, black stools, abdominal pain.  Work-up here reveals melena on exam with hemoglobin of 3 due to upper GI bleed.   CT shows new development of cirrhosis with portal hypertension and varices, no evidence of perforation, possible  duodenitis versus reactive edema.  Differential would include ulcer versus variceal bleed though given relative vital stability this favors somewhat more of a subacute bleed and ulcer/alcoholic gastritis seems more likely.  LFTs elevated, likely all due to alcoholic cirrhosis.    Transfused 2 units of blood, repeat hemoglobin 5.3, started on Protonix and octreotide to cover for any potential variceal bleed.  Will transfuse 2 more units.  We will also cover with antibiotics given possible duodenitis, as well as acute upper GI bleed.  GI saw patient in ED and is aware, recommend stabilizing hemoglobin will plan for likely EGD later tonight or tomorrow no indication for emergent intervention given relative stability.  Discussed with hospitalist who agrees to accept the patient for admission.  Initially plan for admission to ICU however will admit to IMC given improving hemoglobin, stable vitals, no active brisk bleeding.    Critical Care time was 45 minutes for this patient excluding procedures.    Diagnosis:     ICD-10-CM    1. Upper GI bleed  K92.2 ABO/Rh type and screen     Blood component     Blood component     Blood component     Blood component     Blood component     Hemoglobin   2. Anemia due to blood loss, acute  D62    3. Cirrhosis of liver (H)  K74.60        Scribe Disclosure:  I, Brandi Alan, am serving as a scribe on 5/16/2021 at 12:23 PM to personally document services performed by Moo Andrade PA-C, based on my observations and the provider's statements to me.      5/16/2021   EMERGENCY DEPARTMENT     Moo Andrade PA-C  05/16/21 1848

## 2021-05-16 NOTE — ED TRIAGE NOTES
60 year old male presents with complaints of fatigue, decreased appetite, nausea, generalized abdominal pain, black stools since Tuesday. Denies chest pain, SOB. Patient does appear to be pale and in triage, which he states is not normal for him. ABCs intact. GCS 15.

## 2021-05-16 NOTE — ED NOTES
DATE:  5/16/2021   TIME OF RECEIPT FROM LAB:  1802  LAB TEST:  Hgb  LAB VALUE:  5.3  RESULTS GIVEN WITH READ-BACK TO (PROVIDER):  Moo Andrade, *  TIME LAB VALUE REPORTED TO PROVIDER:   1809

## 2021-05-16 NOTE — PHARMACY-ADMISSION MEDICATION HISTORY
Admission medication history interview status for this patient is complete. See [facility] admission navigator for allergy information, prior to admission medications and immunization status.     Medication history interview done, indicate source(s): Patient  Medication history resources (including written lists, pill bottles, clinic record): None  Pharmacy: [facility]    Changes made to PTA medication list:  Added: xyzal  Deleted: amlodipine, cetirizine, fish oil+vit d, magnesium oxide  Changed: nothing    Actions taken by pharmacist (provider contacted, etc):None     Additional medication history information: Patient was taking cetirizine, but switched to xyzal due to ineffectiveness    Medication reconciliation/reorder completed by provider prior to medication history?  N   (Y/N)      Prior to Admission medications    Medication Sig Last Dose Taking? Auth Provider   levocetirizine (XYZAL) 5 MG tablet Take 5 mg by mouth every evening Past Week at Unknown time Yes Unknown, Entered By History

## 2021-05-16 NOTE — CONSULTS
GASTROENTEROLOGY CONSULTATION      Yakov Kahn  [address] [facility] [address]  60 year old male     Admission Date/Time: 5/16/2021  Primary Care Provider: Humberto Vora     We were asked to see the patient in consultation by Moo RANDHAWA for evaluation of GI bleed.    ASSESSMENT:    1. GI bleed - suspect subacute bleed, 5 day history of only 1-2 black stools per day and hgb of 3 with vitals stable (only 1 SBP of 98 in ED, remainder have been 118-138). Signs of cirrhosis and portal HTN/varices on CT, thus variceal bleed possible, however, the small number of black stools (particularly in the last 24 hours and his stability with a hgb of 3 would go against active variceal bleeding at this time), would still treat for possible variceal bleed with octreotide. DDx includes ulcer disease, vascular source, malignancy, etc.   - Given severe anemia, hgb of 3 and vitals stable, would recommend resuscitation with pRBC to a goal of hgb of 7, ED has 4u pRBC ordered, monitor in ICU and we will plan for EGD tomorrow, unless there are signs of increased bleeding or instability, then would pursue EGD sooner.   2. Cirrhosis on imaging - suspect alcohol, given 8 drink equivalent per day use over at least 10 years, will check viral studies. Complications:  A. Ascites - small amount on imaging.  B. Varices - possible, CT shows varices in upper abdomen.  C. Encephalopathy - none  3. Nodularity in liver - will need Liver MRI, will pursue after GI bleed work-up.     RECOMMENDATIONS:  1. Transfuse pRBC as planned  2. Monitor hgb, goal is 7.0  3. Call GI with significant GI bleeding or instability  4. NPO  5. Octreotide infusion  6. PPI  7. Viral hepatitis serology  8. EGD tomorrow, unless signs of active, significant bleeding, then can be done sooner    Hemanth Alford MD   [facility]  [phone]      ________________________________________________________________________        HPI:  Yakov Kahn is a  "60 year old male who has a history of HTN presents with a week and a half of fatigue. He developed black stools some formed and some tarry, just 1-2 per day for the last 5 days. He has had only two black stools in the last 24 hours. No hematochezia. He has nausea, no emesis. No GERD or dysphagia. He does report drinking 4 drinks a day, each with 2 shots of alcohol in them, for 10 years or so. Some mild epigastric pain. No emesis, no hematemesis.     No prior EGD. He took a Cologuard test 2 years ago that was negative.      ROS: A comprehensive ten point review of systems was negative aside from those in mentioned in the HPI.      PAST MEDICAL HISTORY:  No past medical history on file.  SOCIAL HISTORY:  Social History     Tobacco Use     Smoking status: Never Smoker     Smokeless tobacco: Former User   Substance Use Topics     Alcohol use: Yes     Drug use: No     FAMILY HISTORY:  Family History   Problem Relation Age of Onset     Hypertension Brother      Diabetes No family hx of      Other Cancer No family hx of      ALLERGIES: No Known Allergies  MEDICATIONS:   Prior to Admission medications    Medication Sig Start Date End Date Taking? Authorizing Provider   levocetirizine (XYZAL) 5 MG tablet Take 5 mg by mouth every evening   Yes Unknown, Entered By History     PHYSICAL EXAM:   /66   Pulse 91   Temp 98.9  F (37.2  C) (Oral)   Resp 16   Ht 1.727 m (5' 8\")   Wt 87.5 kg (193 lb)   SpO2 97%   BMI 29.35 kg/m     GEN: Alert, oriented x3, NAD.  INGRID: AT, anicteric, OP without erythema, exudate, or ulcers.    NECK: Supple.    LYMPH: No LAD noted.  HRT: RRR, no TWILA  LUNGS: CTA  ABD: +BS, non-tender, non-distended, no rebound or guarding.  SKIN: No rash, jaundice   NEURO: MS intact       ADDITIONAL DATA:   I reviewed the patient's new clinical lab test results.   Recent Labs   Lab Test 05/16/21  1234 11/07/17  0933   WBC 17.2*  --    HGB 3.0* 16.5   MCV 70*  --      --    INR 1.50*  --      Recent Labs "   Lab Test 05/16/21  1234 01/25/18  1039 11/07/17  0933   * 138 137   POTASSIUM 3.7 4.4 5.2   CHLORIDE 101 105 105   CO2 18* 24 24   BUN 36* 11 14   CR 1.06 0.94 0.97   ANIONGAP 13 9 8   DEXTER 8.1* 9.1 9.8   * 171* 111*     Recent Labs   Lab Test 05/16/21  1319 05/16/21  1234   ALBUMIN  --  2.6*   BILITOTAL  --  1.9*   ALT  --  67   AST  --  67*   PROTEIN Negative  --    LIPASE  --  517*        I reviewed the patient's new imaging results.     CT ABDOMEN AND PELVIS WITHOUT CONTRAST 5/16/2021 1:49 PM     CLINICAL HISTORY: Abdominal distension; Bowel obstruction suspected;  Acute jaundice.     TECHNIQUE: CT scan of the abdomen and pelvis was performed following  injection of IV contrast. Multiplanar reformats were obtained. Dose  reduction techniques were used.     CONTRAST: 95mL Isovue-370     COMPARISON: None.     FINDINGS:   LOWER CHEST: Normal.     HEPATOBILIARY: Micronodular opacities noted diffusely throughout the  liver with peripheral hepatic nodular contour consistent with  cirrhosis. A focal small hypodense nodule is 1.2 cm at the inferior  right liver series 3 image 102, as an example lesion. There is small  ascites. Moderate distention of the gallbladder. Multiple vascular  varices identified including esophageal varices and recannulated  paraumbilical vein.     PANCREAS: No visible focal pancreas lesion. No main pancreatic duct  dilatation or fluid collection.     SPLEEN: Mild splenomegaly with craniocaudal length of 14 cm.     ADRENAL GLANDS: Normal.     KIDNEYS/BLADDER: No significant mass, stones, or hydronephrosis. There  are simple or benign cysts. No follow up is needed.     BOWEL: No evidence for small bowel obstruction. There is edema  diffusely involving the duodenum and right colon. No evidence for  appendicitis. No abscess or free air.     PELVIC ORGANS: Small ascites. Mildly prominent prostate measuring 5  cm.     ADDITIONAL FINDINGS: Small periumbilical fat-containing hernia that  is  2.9 cm image 140.     MUSCULOSKELETAL: Normal.                                                                      IMPRESSION:   1.  Cirrhotic liver. Portal hypertension evidenced by multiple  vascular varices and mild splenomegaly. There is small abdominal and  pelvic ascites. Nodularity noted throughout the liver including an  example at the inferior right liver. Recommend correlation with  nonurgent liver MRI.  2.  Edema of the duodenum and right colon could relate to ascites and  liver disease. Cannot exclude an infectious or inflammatory cause of  duodenitis and colitis. No obstruction.  3.  Mildly prominent prostate.  4.  Distended gallbladder.

## 2021-05-17 LAB
AFP SERPL-MCNC: 5.6 UG/L (ref 0–8)
ANION GAP SERPL CALCULATED.3IONS-SCNC: 7 MMOL/L (ref 3–14)
BASOPHILS # BLD AUTO: 0 10E9/L (ref 0–0.2)
BASOPHILS NFR BLD AUTO: 0.3 %
BLD PROD TYP BPU: NORMAL
BLD UNIT ID BPU: 0
BLOOD PRODUCT CODE: NORMAL
BPU ID: NORMAL
BUN SERPL-MCNC: 37 MG/DL (ref 7–30)
CALCIUM SERPL-MCNC: 7.6 MG/DL (ref 8.5–10.1)
CHLORIDE SERPL-SCNC: 103 MMOL/L (ref 94–109)
CO2 SERPL-SCNC: 22 MMOL/L (ref 20–32)
CREAT SERPL-MCNC: 1.29 MG/DL (ref 0.66–1.25)
DIFFERENTIAL METHOD BLD: ABNORMAL
EOSINOPHIL # BLD AUTO: 0.1 10E9/L (ref 0–0.7)
EOSINOPHIL NFR BLD AUTO: 0.7 %
ERYTHROCYTE [DISTWIDTH] IN BLOOD BY AUTOMATED COUNT: 20.5 % (ref 10–15)
FERRITIN SERPL-MCNC: 9 NG/ML (ref 26–388)
GFR SERPL CREATININE-BSD FRML MDRD: 60 ML/MIN/{1.73_M2}
GLUCOSE BLDC GLUCOMTR-MCNC: 142 MG/DL (ref 70–99)
GLUCOSE BLDC GLUCOMTR-MCNC: 146 MG/DL (ref 70–99)
GLUCOSE SERPL-MCNC: 122 MG/DL (ref 70–99)
HCT VFR BLD AUTO: 23.2 % (ref 40–53)
HGB BLD-MCNC: 6.2 G/DL (ref 13.3–17.7)
HGB BLD-MCNC: 7.1 G/DL (ref 13.3–17.7)
HGB BLD-MCNC: 7.2 G/DL (ref 13.3–17.7)
HGB BLD-MCNC: 7.2 G/DL (ref 13.3–17.7)
IMM GRANULOCYTES # BLD: 0.1 10E9/L (ref 0–0.4)
IMM GRANULOCYTES NFR BLD: 0.7 %
IRON SATN MFR SERPL: 3 % (ref 15–46)
IRON SERPL-MCNC: 11 UG/DL (ref 35–180)
LYMPHOCYTES # BLD AUTO: 1 10E9/L (ref 0.8–5.3)
LYMPHOCYTES NFR BLD AUTO: 8.4 %
MCH RBC QN AUTO: 25 PG (ref 26.5–33)
MCHC RBC AUTO-ENTMCNC: 31 G/DL (ref 31.5–36.5)
MCV RBC AUTO: 81 FL (ref 78–100)
MONOCYTES # BLD AUTO: 1.7 10E9/L (ref 0–1.3)
MONOCYTES NFR BLD AUTO: 13.4 %
NEUTROPHILS # BLD AUTO: 9.4 10E9/L (ref 1.6–8.3)
NEUTROPHILS NFR BLD AUTO: 76.5 %
NRBC # BLD AUTO: 0.1 10*3/UL
NRBC BLD AUTO-RTO: 0 /100
PLATELET # BLD AUTO: 169 10E9/L (ref 150–450)
POTASSIUM SERPL-SCNC: 3.7 MMOL/L (ref 3.4–5.3)
RBC # BLD AUTO: 2.88 10E12/L (ref 4.4–5.9)
SODIUM SERPL-SCNC: 132 MMOL/L (ref 133–144)
TIBC SERPL-MCNC: 340 UG/DL (ref 240–430)
TRANSFUSION STATUS PATIENT QL: NORMAL
TRANSFUSION STATUS PATIENT QL: NORMAL
UPPER GI ENDOSCOPY: NORMAL
WBC # BLD AUTO: 12.3 10E9/L (ref 4–11)

## 2021-05-17 PROCEDURE — 36415 COLL VENOUS BLD VENIPUNCTURE: CPT | Performed by: INTERNAL MEDICINE

## 2021-05-17 PROCEDURE — 87340 HEPATITIS B SURFACE AG IA: CPT | Performed by: INTERNAL MEDICINE

## 2021-05-17 PROCEDURE — 0DJ08ZZ INSPECTION OF UPPER INTESTINAL TRACT, VIA NATURAL OR ARTIFICIAL OPENING ENDOSCOPIC: ICD-10-PCS | Performed by: INTERNAL MEDICINE

## 2021-05-17 PROCEDURE — 120N000001 HC R&B MED SURG/OB

## 2021-05-17 PROCEDURE — 99233 SBSQ HOSP IP/OBS HIGH 50: CPT | Performed by: INTERNAL MEDICINE

## 2021-05-17 PROCEDURE — 250N000011 HC RX IP 250 OP 636: Performed by: INTERNAL MEDICINE

## 2021-05-17 PROCEDURE — 80048 BASIC METABOLIC PNL TOTAL CA: CPT | Performed by: INTERNAL MEDICINE

## 2021-05-17 PROCEDURE — 82105 ALPHA-FETOPROTEIN SERUM: CPT | Performed by: INTERNAL MEDICINE

## 2021-05-17 PROCEDURE — P9016 RBC LEUKOCYTES REDUCED: HCPCS | Performed by: PHYSICIAN ASSISTANT

## 2021-05-17 PROCEDURE — 250N000013 HC RX MED GY IP 250 OP 250 PS 637: Performed by: INTERNAL MEDICINE

## 2021-05-17 PROCEDURE — 86708 HEPATITIS A ANTIBODY: CPT | Performed by: INTERNAL MEDICINE

## 2021-05-17 PROCEDURE — 999N000099 HC STATISTIC MODERATE SEDATION < 10 MIN: Performed by: INTERNAL MEDICINE

## 2021-05-17 PROCEDURE — 258N000003 HC RX IP 258 OP 636: Performed by: INTERNAL MEDICINE

## 2021-05-17 PROCEDURE — C9113 INJ PANTOPRAZOLE SODIUM, VIA: HCPCS | Performed by: INTERNAL MEDICINE

## 2021-05-17 PROCEDURE — 250N000009 HC RX 250: Performed by: INTERNAL MEDICINE

## 2021-05-17 PROCEDURE — 85025 COMPLETE CBC W/AUTO DIFF WBC: CPT | Performed by: INTERNAL MEDICINE

## 2021-05-17 PROCEDURE — 86803 HEPATITIS C AB TEST: CPT | Performed by: INTERNAL MEDICINE

## 2021-05-17 PROCEDURE — 86706 HEP B SURFACE ANTIBODY: CPT | Performed by: INTERNAL MEDICINE

## 2021-05-17 PROCEDURE — 85018 HEMOGLOBIN: CPT | Performed by: INTERNAL MEDICINE

## 2021-05-17 PROCEDURE — 999N001017 HC STATISTIC GLUCOSE BY METER IP

## 2021-05-17 PROCEDURE — 43235 EGD DIAGNOSTIC BRUSH WASH: CPT | Performed by: INTERNAL MEDICINE

## 2021-05-17 RX ORDER — NALOXONE HYDROCHLORIDE 0.4 MG/ML
0.4 INJECTION, SOLUTION INTRAMUSCULAR; INTRAVENOUS; SUBCUTANEOUS
Status: DISCONTINUED | OUTPATIENT
Start: 2021-05-17 | End: 2021-05-20 | Stop reason: HOSPADM

## 2021-05-17 RX ORDER — NALOXONE HYDROCHLORIDE 0.4 MG/ML
0.2 INJECTION, SOLUTION INTRAMUSCULAR; INTRAVENOUS; SUBCUTANEOUS
Status: DISCONTINUED | OUTPATIENT
Start: 2021-05-17 | End: 2021-05-20 | Stop reason: HOSPADM

## 2021-05-17 RX ORDER — PANTOPRAZOLE SODIUM 40 MG/1
40 TABLET, DELAYED RELEASE ORAL
Status: DISCONTINUED | OUTPATIENT
Start: 2021-05-18 | End: 2021-05-20 | Stop reason: HOSPADM

## 2021-05-17 RX ORDER — LIDOCAINE 40 MG/G
CREAM TOPICAL
Status: DISCONTINUED | OUTPATIENT
Start: 2021-05-17 | End: 2021-05-17

## 2021-05-17 RX ORDER — FENTANYL CITRATE 50 UG/ML
INJECTION, SOLUTION INTRAMUSCULAR; INTRAVENOUS PRN
Status: COMPLETED | OUTPATIENT
Start: 2021-05-17 | End: 2021-05-17

## 2021-05-17 RX ORDER — PROPRANOLOL HYDROCHLORIDE 10 MG/1
10 TABLET ORAL 3 TIMES DAILY
Status: DISCONTINUED | OUTPATIENT
Start: 2021-05-17 | End: 2021-05-20 | Stop reason: HOSPADM

## 2021-05-17 RX ORDER — MAGNESIUM CARB/ALUMINUM HYDROX 105-160MG
296 TABLET,CHEWABLE ORAL ONCE
Status: COMPLETED | OUTPATIENT
Start: 2021-05-18 | End: 2021-05-18

## 2021-05-17 RX ORDER — LIDOCAINE 40 MG/G
CREAM TOPICAL
Status: DISCONTINUED | OUTPATIENT
Start: 2021-05-17 | End: 2021-05-20 | Stop reason: HOSPADM

## 2021-05-17 RX ORDER — FLUMAZENIL 0.1 MG/ML
0.2 INJECTION, SOLUTION INTRAVENOUS
Status: ACTIVE | OUTPATIENT
Start: 2021-05-17 | End: 2021-05-17

## 2021-05-17 RX ORDER — POLYETHYLENE GLYCOL 3350 17 G/17G
238 POWDER, FOR SOLUTION ORAL ONCE
Status: COMPLETED | OUTPATIENT
Start: 2021-05-17 | End: 2021-05-17

## 2021-05-17 RX ADMIN — PROPRANOLOL HYDROCHLORIDE 10 MG: 10 TABLET ORAL at 14:21

## 2021-05-17 RX ADMIN — FENTANYL CITRATE 100 MCG: 50 INJECTION, SOLUTION INTRAMUSCULAR; INTRAVENOUS at 11:22

## 2021-05-17 RX ADMIN — Medication 238 G: at 15:39

## 2021-05-17 RX ADMIN — PROPRANOLOL HYDROCHLORIDE 10 MG: 10 TABLET ORAL at 20:22

## 2021-05-17 RX ADMIN — SODIUM CHLORIDE 8 MG/HR: 9 INJECTION, SOLUTION INTRAVENOUS at 09:00

## 2021-05-17 RX ADMIN — MIDAZOLAM 2 MG: 1 INJECTION INTRAMUSCULAR; INTRAVENOUS at 11:22

## 2021-05-17 RX ADMIN — TOPICAL ANESTHETIC 1 EACH: 200 SPRAY DENTAL; PERIODONTAL at 11:23

## 2021-05-17 ASSESSMENT — ACTIVITIES OF DAILY LIVING (ADL)
ADLS_ACUITY_SCORE: 19

## 2021-05-17 ASSESSMENT — MIFFLIN-ST. JEOR: SCORE: 1693.5

## 2021-05-17 NOTE — PLAN OF CARE
Received from Ed, alert oriented x 4, finishing up 3 unit of pc, oriented to room, admitted to icu    ICU End of Shift Summary.  For vital signs and complete assessments, please see documentation flowsheets.     Pertinent assessments: patient alert 7 oriented x 4 , no c/o pain nor SOB villegas, cms intact, no reaction from blood transfusions.   Major Shift Events: npo at midnight  Plan (Upcoming Events): cont poc, recheck hgb  Discharge/Transfer Needs: tbd    Bedside Shift Report Completed : yes  Bedside Safety Check Completed: yes

## 2021-05-17 NOTE — PROGRESS NOTES
Full note to follow    No active bleed on EGD    Can tx to floor today    GI planning on colonoscopy tomorrow

## 2021-05-17 NOTE — ED NOTES
Voided 400 ml dark jefry urine at 2020 Denies any pain Report called to Jessica in ICU.  Please call wife if patient gets scoped tonight.

## 2021-05-17 NOTE — PLAN OF CARE
Pertinent assessments: A&O. Denies SOB, RA. BS active, bowel prep in progress. No signs of bleeding. Ambulating independently, educated to call for help if needed.     Major Shift Events: Uneventful.     Treatment Plan: Bowel prep for colonoscopy tomorrow at 1205. Hgb monitoring.

## 2021-05-17 NOTE — PROGRESS NOTES
[facility]  Hospitalist Progress Note  Hemanth Mark MD 05/17/2021    Reason for Stay (Diagnosis): iron deficiency anemia         Assessment and Plan:      Summary of Stay: Yakov Kahn is a 60-year-old male with history of alcohol dependence and untreated hypertension.  He has no known history of liver disease.  He presented to the hospital today with several weeks of fatigue, generalized weakness, and dark stools.  On presentation to the ER, vital signs notable for mild tachycardia with otherwise stable vital signs and normal blood pressure.  Lab work notable for hemoglobin of 3.  Other labs notable for leukocytosis, mildly elevated lipase level, mild total bilirubin elevation, and elevated glucose of 240.  Imaging included abdominal pelvic CT scan showing evidence of a cirrhotic liver with portal hypertension.  Nodularity throughout the liver.  Edema of the duodenum.     He was initiated on proton pump inhibitor drip and octreotide drip and provided 2 units packed red blood cells.  He will be admitted to the Intensive Care Unit for close monitoring the setting of his profound anemia.  Pt has received 4 units PRBC.  hgb has improved from 3 to 7.  He has remained hemodynamically stable with no e/o active GIB.  GI performed an EGD 5/16 - non-bleeding varices with no blood found.  GI planning colonoscopy tomorrow    Plan:  - repeat hgb this aftn and again in evening.  - conditional blood tx ordered for hgb<7  - stop PPI gtt  - start PO PPI  - stop octreotide gtt  - start PO propranolol in the setting of non-bleeding esoph varices  - stop Rocephin  - can tx to med surg floor    1.  Anemia:  Suspect subacute to chronic given how well he was tolerating this degree of anemia on admit.    - s/p 4 units PRBC as above without e/o active hemorrhage  2.  Daily alcohol use, alcohol dependence.  Last drink about 1 week ago.  No evidence of withdrawal.  3.  Alcoholic liver disease with findings that  "indicate possible cirrhosis on CT imaging.  4.  Nodularity of the liver with radiology recommending outpatient MRI.  - d/w pt and spouse today.  Provided copy of CT report indicating recommendation for outpt MRI.  They will d/w their primary MD who can arrange this for them    CODE:  Full  DVT ppx:  SCD/mobilize  DISPO:  Home 1-2 days anticipated        Interval History (Subjective):      Feels well.  No complaints.  received 4 unit(s) prbc since presenting to ER.  No e/o active bleeding since admit.  No fever                  Physical Exam:      Last Vital Signs:  /52   Pulse 75   Temp 98.2  F (36.8  C) (Oral)   Resp 8   Ht 1.727 m (5' 8\")   Wt 90.9 kg (200 lb 6.4 oz)   SpO2 99%   BMI 30.47 kg/m        Intake/Output Summary (Last 24 hours) at 5/17/2021 1259  Last data filed at 5/17/2021 0800  Gross per 24 hour   Intake 2443.75 ml   Output 1050 ml   Net 1393.75 ml       Constitutional: Awake, alert, cooperative, no apparent distress.  Spouse present   Respiratory: Clear to auscultation bilaterally, no crackles or wheezing   Cardiovascular: Regular rate and rhythm, normal S1 and S2, and no murmur noted   Abdomen: Normal bowel sounds, soft, non-distended, non-tender   Skin: No rashes, no cyanosis, dry to touch   Neuro: Alert and oriented x3, no weakness, numbness, memory loss   Extremities: No edema, normal range of motion   Other(s):        All other systems: Negative          Medications:      All current medications were reviewed with changes reflected in problem list.         Data:      All new lab and imaging data was reviewed.   Labs:  Recent Labs   Lab 05/17/21  0604   WBC 12.3*   HGB 7.2*   HCT 23.2*   MCV 81         Imaging:   Recent Results (from the past 24 hour(s))   Abd/pelvis CT,  IV  contrast only TRAUMA / AAA    Narrative    CT ABDOMEN AND PELVIS WITHOUT CONTRAST 5/16/2021 1:49 PM    CLINICAL HISTORY: Abdominal distension; Bowel obstruction suspected;  Acute jaundice.    TECHNIQUE: " CT scan of the abdomen and pelvis was performed following  injection of IV contrast. Multiplanar reformats were obtained. Dose  reduction techniques were used.    CONTRAST: 95mL Isovue-370    COMPARISON: None.    FINDINGS:   LOWER CHEST: Normal.    HEPATOBILIARY: Micronodular opacities noted diffusely throughout the  liver with peripheral hepatic nodular contour consistent with  cirrhosis. A focal small hypodense nodule is 1.2 cm at the inferior  right liver series 3 image 102, as an example lesion. There is small  ascites. Moderate distention of the gallbladder. Multiple vascular  varices identified including esophageal varices and recannulated  paraumbilical vein.    PANCREAS: No visible focal pancreas lesion. No main pancreatic duct  dilatation or fluid collection.    SPLEEN: Mild splenomegaly with craniocaudal length of 14 cm.    ADRENAL GLANDS: Normal.    KIDNEYS/BLADDER: No significant mass, stones, or hydronephrosis. There  are simple or benign cysts. No follow up is needed.    BOWEL: No evidence for small bowel obstruction. There is edema  diffusely involving the duodenum and right colon. No evidence for  appendicitis. No abscess or free air.    PELVIC ORGANS: Small ascites. Mildly prominent prostate measuring 5  cm.    ADDITIONAL FINDINGS: Small periumbilical fat-containing hernia that is  2.9 cm image 140.    MUSCULOSKELETAL: Normal.      Impression    IMPRESSION:   1.  Cirrhotic liver. Portal hypertension evidenced by multiple  vascular varices and mild splenomegaly. There is small abdominal and  pelvic ascites. Nodularity noted throughout the liver including an  example at the inferior right liver. Recommend correlation with  nonurgent liver MRI.  2.  Edema of the duodenum and right colon could relate to ascites and  liver disease. Cannot exclude an infectious or inflammatory cause of  duodenitis and colitis. No obstruction.  3.  Mildly prominent prostate.  4.  Distended gallbladder.    SHARYN MATTSON MD

## 2021-05-17 NOTE — PLAN OF CARE
ICU End of Shift Summary.  For vital signs and complete assessments, please see documentation flowsheets.     Pertinent assessments: VSS, hgb this am 7.2, next hgb check 1500  Major Shift Events: endo, voided in urinal  Plan (Upcoming Events): tx to floor and prep for colonoscopy tomorrow  Discharge/Transfer Needs: home with wife    Bedside Shift Report Completed : y  Bedside Safety Check Completed: y  Report called to 5th floor nurse. EGD showed stage 1 varices. No bleeding. Protonix and octreotide gtt stopped.

## 2021-05-17 NOTE — H&P
Admitted: 05/16/2021    CHIEF COMPLAINT:  Fatigue, black stools.  On presentation to the ER, workup included a hemoglobin of 3.    HISTORY OF PRESENT ILLNESS:  Mr. Kahn is a 60-year-old male with a history of daily alcohol use.  He presented to the hospital for concerns about fatigue and generalized weakness.  He has been noticing these symptoms for the past several weeks.  He is additionally noticed dark stools.  He has not had any bloody or maroon-colored stools.  He has never had any history of GI bleeding.  He has no known history of liver disease.  He does admit to drinking alcohol daily up to 4 drinks per day for the last 8-10 years.  He denies any excessive NSAID use, and uses an ibuprofen rarely.  He is on no blood thinners and takes an over-the-counter allergy medication for seasonal allergies.  Denies any other medications.    On arrival to the ER today.  VITAL SIGNS:  Included blood pressure 128/60 with a heart rate of 111.  No fever.  Saturation 100% on room air.  Workup in the ER included.    LABORATORIES AND IMAGING:  Sodium 132.  Glucose 244.  BUN is 36.  Creatinine is 1.  Ammonia is 40.  INR is 1.5.  Alcohol is undetectable.  Bilirubin 1.9.  AST 67.  Lipase is 517.  Hemoglobin is 3.  MCV is 70.  Platelets 270.  White cell count of 17,000.  COVID-19 is negative.  Urinalysis unremarkable.  Type and screen was done in the Emergency Department and 2 units packed red blood cell transfusion was initiated.  Follow up hemoglobin 5.3.  Plans are for admission to the intensive care unit given his degree of anemia.    I spoke to the ER provider.  He has contacted gastroenterology.  Given the patient was hemodynamically stable with suspicion that this is likely a slow more subacute to chronic GI bleed.  The patient will be admitted, transfusion will be performed, and GI planning on endoscopy tomorrow.  Pantoprazole drip and octreotide drip were started in the Emergency Department.    IMAGING DATA:  Included  abdominal pelvic CT scan showing a cirrhotic liver with portal hypertension and multiple vascular varices with mild splenomegaly.  No nodularity noted throughout the liver.  Edema of the duodenum and right colon also noted.  Cannot exclude infectious or inflammatory cause of the duodenitis and colitis.    PAST MEDICAL HISTORY:     1.  History of alcohol dependence with daily alcohol use.  No known history of liver disease previously.  2.  Denies any history of alcohol withdrawal.  3.  Previous rotator cuff surgery.  4.  Hypertension, untreated.    Current medicines:  1.  Zyrtec for seasonal allergies.  2.  Xyzal.    ALLERGIES:  None.    FAMILY HISTORY:  Reviewed.  Nothing contributory to this admission.    SOCIAL HISTORY:  The patient drinks up to 4 cocktails per day for the last decade.  He reports his last alcoholic beverage was approximately 1 week ago.  Denies any history of withdrawal symptoms.  Former tobacco chewer,.  He is here with his spouse and daughter today.    REVIEW OF SYSTEMS:  See HPI for details.  Comprehensive greater than 10-point review of systems is otherwise negative besides that detailed above.    PHYSICAL EXAMINATION:  Blood pressure is currently 120/70 with a heart rate of 90.  No fever.  Saturation 99% on room air.  GENERAL:  The patient appears pale.  He is awake, alert and oriented x3.  Her spouse and daughter present at bedside.  He was seen in the ER prior to admission.  HEENT:  Head is atraumatic.  Sclerae white.  Eyelids normal.  Conjunctivae normal.  Extraocular movements are intact.  NECK:  Supple.  No cervical or supraclavicular lymphadenopathy.  Conjunctivae appear pale.  CARDIOVASCULAR:  Regular rate and rhythm.  No significant murmurs.  No lower extremity edema.  LUNGS:  Clear to auscultation bilaterally.  No intercostal retractions.  No conversational dyspnea.  ABDOMEN:  Nontender Soft, no masses.  No organomegaly.  EXTREMITIES:  Show no edema.  SKIN:  Reveals no rashes.  No  jaundice.  Skin is dry to touch.  NEUROLOGIC:  Cranial nerves 2 through intact.  Moves all extremities appropriately.  Sensation intact to light touch in the upper and lower extremities bilaterally.  Psychiatric:  The patient is awake, alert and oriented x3.  Mood and affect are normal and appropriate.    LABORATORY AND IMAGING DATA:  Reviewed above in HPI.    PLAN:  Mr. Kahn is a 60-year-old male with history of alcohol dependence and untreated hypertension.  He has no known history of liver disease.  He presented to the hospital today with several weeks of fatigue, generalized weakness, and dark stools.  On presentation to the ER, vital signs notable for mild tachycardia with otherwise stable vital signs and normal blood pressure.  Lab work notable for hemoglobin of 3.  Other labs notable for leukocytosis, mildly elevated lipase level, mild total bilirubin elevation, and elevated glucose of 240.  Imaging included abdominal pelvic CT scan showing evidence of a cirrhotic liver with portal hypertension.  Nodularity throughout the liver.  Edema of the duodenum.    He was initiated on proton pump inhibitor drip and octreotide drip and provided 2 units packed red blood cells.  GI has been contacted and are aware of the patient.  He will be admitted to the Intensive Care Unit for close monitoring the setting of his profound anemia.  1.  Anemia:  Suspect subacute to chronic given how well he is tolerating this degree of anemia with quite stable vital signs.  Suspect upper gastrointestinal bleed.  Given edema of the duodenal wonder about the possible duodenal ulcer.  Doubt variceal bleed.  2.  Daily alcohol use, alcohol dependence.  Last drink about 1 week ago.  No evidence of withdrawal.  3.  Alcoholic liver disease with findings that indicate possible cirrhosis on CT imaging.  4.  Nodularity of the liver with radiology recommending outpatient MRI.    PLAN:     1.  Admit to ICU given degree of anemia for close  monitoring.  2.  A conditional order placed for packed red blood cell transfusion.  I have ordered 2 units packed red blood cell transfusion for hemoglobin less than 7.  3.  Check hemoglobin every 6 hours.  4.  Continue octreotide drip started in the ER, although I am doubtful of acute variceal bleed.  5.  Continue pantoprazole drip started in the ER.  6.  Liquid diet today without red foods, n.p.o. after midnight.  7.  GI consultation.  Anticipate endoscopy tomorrow.  Given the stability of the patient currently and no need for acute endoscopy.  8.  Outpatient MRI recommended given nodularity of liver.  9.  I have advised complete alcohol cessation.  10.  Recommend outpatient gastroenterology or hepatology followup given findings of alcoholic liver disease on CT imaging.  11.  The patient reports last drink was approximately 1 week ago.  No evidence of withdrawal.  Currently, it should not be an issue this admission.    Hemanth Mark MD        D: 2021   T: 2021   MT: [id number]    Name:     RADHA JOHNSON  MRN:      [id number]        Account:     [id number]   :      1960           Admitted:    2021       Document: [id number]

## 2021-05-17 NOTE — PLAN OF CARE
ICU End of Shift Summary.  For vital signs and complete assessments, please see documentation flowsheets.     Pertinent assessments: A&Ox4. SBA at bedside. AVSS, RA. Denies pain, nausea, or dizziness. Octreotide & protonix gtt infusing. CMS intact. Voided x1. No BMs this shift, passing gas.   Major Shift Events: Hgb 6.2, [employer] aware. One unit PRBCs given. Hgb recheck this am  Plan (Upcoming Events): EGD with GI this am  Discharge/Transfer Needs: Pt can transfer to M/S when able    Bedside Shift Report Completed : y  Bedside Safety Check Completed: y

## 2021-05-17 NOTE — PRE-PROCEDURE
Pre-Endoscopy History and Physical     Yakov Kahn MRN# [id number]   YOB: 1960 Age: 60 year old     Date of Procedure: 5/16/2021  Primary care provider: Humberto Vora  Type of Endoscopy: esophagogastroduodenoscopy (upper GI endoscopy)  Reason for Procedure: anemia, melena  Type of Anesthesia Anticipated: Moderate (conscious) sedation    HPI:    Yakov is a 60 year old male who will be undergoing the above procedure.      A history and physical has been performed. The patient's medications and allergies have been reviewed. The risks and benefits of the procedure and the sedation options and risks were discussed with the patient.  All questions were answered and informed consent was obtained.      No Known Allergies     Current Facility-Administered Medications   Medication     cefTRIAXone (ROCEPHIN) 2 g vial to attach to  ml bag for ADULTS or NS 50 ml bag for PEDS     glucose gel 15-30 g    Or     dextrose 50 % injection 25-50 mL    Or     glucagon injection 1 mg     lidocaine (LMX4) kit     lidocaine 1 % 0.1-1 mL     May continue current IV fluids if patient has IV fluids infusing.     May take regular AM medications except those listed below     octreotide (sandoSTATIN) 1,250 mcg in sodium chloride 0.9 % 250 mL     pantoprazole (PROTONIX) 80 mg in sodium chloride 0.9 % 100 mL infusion     sodium chloride (PF) 0.9% PF flush 3 mL     sodium chloride (PF) 0.9% PF flush 3 mL       Patient Active Problem List   Diagnosis     Plantar fascial fibromatosis     CARDIOVASCULAR SCREENING; LDL GOAL LESS THAN 160     Essential hypertension     Upper GI bleed     Anemia due to blood loss, acute        No past medical history on file.     No past surgical history on file.    Social History     Tobacco Use     Smoking status: Never Smoker     Smokeless tobacco: Former User   Substance Use Topics     Alcohol use: Yes       Family History   Problem Relation Age of Onset     Hypertension Brother       "Diabetes No family hx of      Other Cancer No family hx of             Medications:     Medications Prior to Admission   Medication Sig Dispense Refill Last Dose     levocetirizine (XYZAL) 5 MG tablet Take 5 mg by mouth every evening   Past Week at Unknown time       Scheduled Medications:    cefTRIAXone  2 g Intravenous Q24H     sodium chloride (PF)  3 mL Intracatheter Q8H       PRN:  glucose **OR** dextrose **OR** glucagon, lidocaine 4%, lidocaine (buffered or not buffered), - MEDICATION INSTRUCTIONS -, - MEDICATION INSTRUCTIONS -, sodium chloride (PF)    PHYSICAL EXAM:   /72   Pulse 75   Temp 98.2  F (36.8  C) (Oral)   Resp 14   Ht 1.727 m (5' 8\")   Wt 90.9 kg (200 lb 6.4 oz)   SpO2 97%   BMI 30.47 kg/m   Estimated body mass index is 30.47 kg/m  as calculated from the following:    Height as of this encounter: 1.727 m (5' 8\").    Weight as of this encounter: 90.9 kg (200 lb 6.4 oz).   RESP: lungs clear to auscultation - no rales, rhonchi or wheezes  CV: regular rates and rhythm    IMPRESSION   ASA Class 3 - Severe systemic disease, but not incapacitating      Signed Electronically by: Rey Zamora MD  May 17, 2021    .            "

## 2021-05-18 LAB
ABO + RH BLD: NORMAL
ABO + RH BLD: NORMAL
ANION GAP SERPL CALCULATED.3IONS-SCNC: 4 MMOL/L (ref 3–14)
BASOPHILS # BLD AUTO: 0.1 10E9/L (ref 0–0.2)
BASOPHILS NFR BLD AUTO: 0.5 %
BLD GP AB SCN SERPL QL: NORMAL
BLD PROD TYP BPU: NORMAL
BLD PROD TYP BPU: NORMAL
BLD UNIT ID BPU: 0
BLOOD BANK CMNT PATIENT-IMP: NORMAL
BLOOD PRODUCT CODE: NORMAL
BPU ID: NORMAL
BUN SERPL-MCNC: 23 MG/DL (ref 7–30)
CALCIUM SERPL-MCNC: 7.5 MG/DL (ref 8.5–10.1)
CHLORIDE SERPL-SCNC: 105 MMOL/L (ref 94–109)
CO2 SERPL-SCNC: 25 MMOL/L (ref 20–32)
COLONOSCOPY: NORMAL
CREAT SERPL-MCNC: 1.14 MG/DL (ref 0.66–1.25)
DIFFERENTIAL METHOD BLD: ABNORMAL
EOSINOPHIL # BLD AUTO: 0.3 10E9/L (ref 0–0.7)
EOSINOPHIL NFR BLD AUTO: 3.2 %
ERYTHROCYTE [DISTWIDTH] IN BLOOD BY AUTOMATED COUNT: 20.7 % (ref 10–15)
GFR SERPL CREATININE-BSD FRML MDRD: 69 ML/MIN/{1.73_M2}
GLUCOSE SERPL-MCNC: 112 MG/DL (ref 70–99)
HAV IGG SER QL IA: NONREACTIVE
HBA1C MFR BLD: 5.4 % (ref 0–5.6)
HBV SURFACE AB SERPL IA-ACNC: 6.47 M[IU]/ML
HBV SURFACE AG SERPL QL IA: NONREACTIVE
HCT VFR BLD AUTO: 22.3 % (ref 40–53)
HCV AB SERPL QL IA: NONREACTIVE
HGB BLD-MCNC: 6.9 G/DL (ref 13.3–17.7)
IMM GRANULOCYTES # BLD: 0.1 10E9/L (ref 0–0.4)
IMM GRANULOCYTES NFR BLD: 0.5 %
LYMPHOCYTES # BLD AUTO: 1 10E9/L (ref 0.8–5.3)
LYMPHOCYTES NFR BLD AUTO: 10.3 %
MCH RBC QN AUTO: 25.1 PG (ref 26.5–33)
MCHC RBC AUTO-ENTMCNC: 30.9 G/DL (ref 31.5–36.5)
MCV RBC AUTO: 81 FL (ref 78–100)
MONOCYTES # BLD AUTO: 1.4 10E9/L (ref 0–1.3)
MONOCYTES NFR BLD AUTO: 14.1 %
NEUTROPHILS # BLD AUTO: 6.9 10E9/L (ref 1.6–8.3)
NEUTROPHILS NFR BLD AUTO: 71.4 %
NRBC # BLD AUTO: 0 10*3/UL
NRBC BLD AUTO-RTO: 0 /100
NUM BPU REQUESTED: 5
PLATELET # BLD AUTO: 155 10E9/L (ref 150–450)
POTASSIUM SERPL-SCNC: 3.8 MMOL/L (ref 3.4–5.3)
RBC # BLD AUTO: 2.75 10E12/L (ref 4.4–5.9)
SODIUM SERPL-SCNC: 134 MMOL/L (ref 133–144)
SPECIMEN EXP DATE BLD: NORMAL
TRANSFUSION STATUS PATIENT QL: NORMAL
TRANSFUSION STATUS PATIENT QL: NORMAL
WBC # BLD AUTO: 9.7 10E9/L (ref 4–11)

## 2021-05-18 PROCEDURE — 250N000011 HC RX IP 250 OP 636: Performed by: INTERNAL MEDICINE

## 2021-05-18 PROCEDURE — 250N000013 HC RX MED GY IP 250 OP 250 PS 637: Performed by: INTERNAL MEDICINE

## 2021-05-18 PROCEDURE — 88305 TISSUE EXAM BY PATHOLOGIST: CPT | Mod: TC | Performed by: INTERNAL MEDICINE

## 2021-05-18 PROCEDURE — 85025 COMPLETE CBC W/AUTO DIFF WBC: CPT | Performed by: INTERNAL MEDICINE

## 2021-05-18 PROCEDURE — 80048 BASIC METABOLIC PNL TOTAL CA: CPT | Performed by: INTERNAL MEDICINE

## 2021-05-18 PROCEDURE — 88305 TISSUE EXAM BY PATHOLOGIST: CPT | Mod: 26

## 2021-05-18 PROCEDURE — 0DBL8ZZ EXCISION OF TRANSVERSE COLON, VIA NATURAL OR ARTIFICIAL OPENING ENDOSCOPIC: ICD-10-PCS | Performed by: INTERNAL MEDICINE

## 2021-05-18 PROCEDURE — 45385 COLONOSCOPY W/LESION REMOVAL: CPT | Performed by: INTERNAL MEDICINE

## 2021-05-18 PROCEDURE — 99233 SBSQ HOSP IP/OBS HIGH 50: CPT | Performed by: INTERNAL MEDICINE

## 2021-05-18 PROCEDURE — 120N000001 HC R&B MED SURG/OB

## 2021-05-18 PROCEDURE — 0DBP8ZZ EXCISION OF RECTUM, VIA NATURAL OR ARTIFICIAL OPENING ENDOSCOPIC: ICD-10-PCS | Performed by: INTERNAL MEDICINE

## 2021-05-18 PROCEDURE — 36415 COLL VENOUS BLD VENIPUNCTURE: CPT | Performed by: INTERNAL MEDICINE

## 2021-05-18 PROCEDURE — P9016 RBC LEUKOCYTES REDUCED: HCPCS | Performed by: PHYSICIAN ASSISTANT

## 2021-05-18 PROCEDURE — G0500 MOD SEDAT ENDO SERVICE >5YRS: HCPCS | Performed by: INTERNAL MEDICINE

## 2021-05-18 PROCEDURE — 83036 HEMOGLOBIN GLYCOSYLATED A1C: CPT | Performed by: INTERNAL MEDICINE

## 2021-05-18 PROCEDURE — 258N000003 HC RX IP 258 OP 636: Performed by: INTERNAL MEDICINE

## 2021-05-18 PROCEDURE — 0DBK8ZZ EXCISION OF ASCENDING COLON, VIA NATURAL OR ARTIFICIAL OPENING ENDOSCOPIC: ICD-10-PCS | Performed by: INTERNAL MEDICINE

## 2021-05-18 RX ORDER — METHYLPREDNISOLONE SODIUM SUCCINATE 125 MG/2ML
125 INJECTION, POWDER, LYOPHILIZED, FOR SOLUTION INTRAMUSCULAR; INTRAVENOUS
Status: DISCONTINUED | OUTPATIENT
Start: 2021-05-18 | End: 2021-05-20 | Stop reason: HOSPADM

## 2021-05-18 RX ORDER — FLUMAZENIL 0.1 MG/ML
0.2 INJECTION, SOLUTION INTRAVENOUS
Status: ACTIVE | OUTPATIENT
Start: 2021-05-18 | End: 2021-05-19

## 2021-05-18 RX ORDER — DIPHENHYDRAMINE HYDROCHLORIDE 50 MG/ML
50 INJECTION INTRAMUSCULAR; INTRAVENOUS
Status: DISCONTINUED | OUTPATIENT
Start: 2021-05-18 | End: 2021-05-20 | Stop reason: HOSPADM

## 2021-05-18 RX ORDER — FENTANYL CITRATE 50 UG/ML
INJECTION, SOLUTION INTRAMUSCULAR; INTRAVENOUS PRN
Status: COMPLETED | OUTPATIENT
Start: 2021-05-18 | End: 2021-05-18

## 2021-05-18 RX ADMIN — MAGNESIUM CITRATE 296 ML: 1.75 LIQUID ORAL at 07:33

## 2021-05-18 RX ADMIN — PROPRANOLOL HYDROCHLORIDE 10 MG: 10 TABLET ORAL at 09:06

## 2021-05-18 RX ADMIN — MIDAZOLAM 1 MG: 1 INJECTION INTRAMUSCULAR; INTRAVENOUS at 12:08

## 2021-05-18 RX ADMIN — PANTOPRAZOLE SODIUM 40 MG: 40 TABLET, DELAYED RELEASE ORAL at 05:50

## 2021-05-18 RX ADMIN — FENTANYL CITRATE 100 MCG: 50 INJECTION, SOLUTION INTRAMUSCULAR; INTRAVENOUS at 12:08

## 2021-05-18 RX ADMIN — PROPRANOLOL HYDROCHLORIDE 10 MG: 10 TABLET ORAL at 13:26

## 2021-05-18 RX ADMIN — IRON SUCROSE 300 MG: 20 INJECTION, SOLUTION INTRAVENOUS at 16:21

## 2021-05-18 RX ADMIN — PROPRANOLOL HYDROCHLORIDE 10 MG: 10 TABLET ORAL at 22:43

## 2021-05-18 ASSESSMENT — ACTIVITIES OF DAILY LIVING (ADL)
ADLS_ACUITY_SCORE: 19
ADLS_ACUITY_SCORE: 17
ADLS_ACUITY_SCORE: 17

## 2021-05-18 ASSESSMENT — MIFFLIN-ST. JEOR: SCORE: 1689.43

## 2021-05-18 NOTE — CONSULTS
"CLINICAL NUTRITION SERVICES  -  ASSESSMENT NOTE    Recommendations Ordered by Registered Dietitian (RD):   Diet per MD  Will plan to order oral nutritional supplements as diet advances   Malnutrition:   % Weight Loss: unable to determine - No weight history   % Intake:  <75% for > 7 days (non-severe malnutrition)  Subcutaneous Fat Loss:  Orbital region mild depletion and Upper arm region mild depletion  Muscle Loss:  Temporal region mild depletion, Clavicle bone region mild depletion and Acromion bone region mild depletion  Fluid Retention:  None noted    Malnutrition Diagnosis: Non-Severe malnutrition  In Context of:  Acute illness or injury  Chronic illness or disease     REASON FOR ASSESSMENT  Yakov Kahn is a 60 year old male seen by Registered Dietitian for Admission Nutrition Risk Screen for positive    NUTRITION HISTORY  - Information obtained from patient and chart   - Patient admitted for anemia   - History of alcohol dependence and untreated hypertension  - Typical diet at home: regular diet   - Typical food/fluid intake PTA: pt stated that his appetite was poor for about 1 week PTA. Attributes this to increased nausea. Overall was consuming smaller portions and less meals. Typically will consistently consume dinner and lunch is \"hit or miss\". Consumes \"optavia bars\" for lunch and breakfast at times.  - Supplements: none   - Chewing/swallowing difficulty: none   - Food allergies: NKFA    CURRENT NUTRITION ORDERS  Diet Order:     NPO     Current Intake/Tolerance:  NPO     NUTRITION FOCUSED PHYSICAL ASSESSMENT FOR DIAGNOSING MALNUTRITION)  Yes               Observed:    Muscle wasting (refer to documentation in Malnutrition section) and Subcutaneous fat loss (refer to documentation in Malnutrition section)    Obtained from Chart/Interdisciplinary Team:  - GI following, pt underwent upper GI endoscopy on 5/17    ANTHROPOMETRICS  Height: 5' 8\"  Weight: 90.5 kg ( 199 lbs 8 oz)   Body mass index is 30.33 " kg/m .  Weight Status:  Obesity Grade I BMI 30-34.9  Weight History: No weight history to comment on. Pt states that his weight fluctuates and that he may have lost around 1-2 lbs., typically around 200 lbs.    Wt Readings from Last 10 Encounters:   05/18/21 90.5 kg (199 lb 8 oz)   03/20/18 95 kg (209 lb 8 oz)   02/27/18 94.1 kg (207 lb 8 oz)   02/08/18 95 kg (209 lb 8 oz)   11/07/17 93.9 kg (207 lb 1.6 oz)   07/28/17 92.8 kg (204 lb 9.6 oz)   04/07/15 95.7 kg (210 lb 14.4 oz)   09/17/04 87.5 kg (193 lb)     LABS  Labs reviewed    Electrolytes  Potassium (mmol/L)   Date Value   05/18/2021 3.8   05/17/2021 3.7   05/16/2021 3.7    Blood Glucose  Glucose (mg/dL)   Date Value   05/18/2021 112 (H)   05/17/2021 122 (H)   05/16/2021 244 (H)   01/25/2018 171 (H)   11/07/2017 111 (H)    Inflammatory Markers  WBC (10e9/L)   Date Value   05/18/2021 9.7   05/17/2021 12.3 (H)   05/16/2021 17.2 (H)     Albumin (g/dL)   Date Value   05/16/2021 2.6 (L)      Sodium (mmol/L)   Date Value   05/18/2021 134   05/17/2021 132 (L)   05/16/2021 132 (L)    Renal  Urea Nitrogen (mg/dL)   Date Value   05/18/2021 23   05/17/2021 37 (H)   05/16/2021 36 (H)     Creatinine (mg/dL)   Date Value   05/18/2021 1.14   05/17/2021 1.29 (H)   05/16/2021 1.06     Additional  Triglycerides (mg/dL)   Date Value   07/28/2017 123   10/30/2002 112     Ketones Urine (mg/dL)   Date Value   05/16/2021 Negative        MEDICATIONS  Medications reviewed    pantoprazole  40 mg Oral QAM AC     propranolol  10 mg Oral TID     sodium chloride (PF)  3 mL Intracatheter Q8H        - MEDICATION INSTRUCTIONS -       - MEDICATION INSTRUCTIONS -       - MEDICATION INSTRUCTIONS -        glucose **OR** dextrose **OR** glucagon, lidocaine 4%, lidocaine (buffered or not buffered), - MEDICATION INSTRUCTIONS -, - MEDICATION INSTRUCTIONS -, - MEDICATION INSTRUCTIONS -, naloxone, naloxone, naloxone, naloxone, sodium chloride (PF), sodium chloride (PF)     ASSESSED NUTRITION NEEDS PER  APPROVED PRACTICE GUIDELINES:    Dosing Weight 90.5 kg   Estimated Energy Needs: 1855-6360 kcals (20-25 Kcal/Kg), MSJ = 2032 kcal (AF 1.2)   Justification: maintenance  Estimated Protein Needs: + grams protein (1-1.2+ g pro/Kg)  Justification: preservation of lean body mass  Estimated Fluid Needs: per MD     MALNUTRITION:  % Weight Loss: unable to determine - No weight history   % Intake:  <75% for > 7 days (non-severe malnutrition)  Subcutaneous Fat Loss:  Orbital region mild depletion and Upper arm region mild depletion  Muscle Loss:  Temporal region mild depletion, Clavicle bone region mild depletion and Acromion bone region mild depletion  Fluid Retention:  None noted    Malnutrition Diagnosis: Non-Severe malnutrition  In Context of:  Acute illness or injury  Chronic illness or disease    NUTRITION DIAGNOSIS:  Inadequate oral intake related to increased nausea and lack of appetite as evidenced by pt likely consuming <75% of nutritional needs over the past 1 week.     NUTRITION INTERVENTIONS  Recommendations / Nutrition Prescription  Diet per MD  Will plan to order oral nutritional supplements as diet advances    Implementation  Nutrition education: Provided education on the role of the RD and the different oral nutritional supplements when diet advances   Collaboration and Referral of Nutrition care: discussed pt during interdisciplinary rounds this morning     Nutrition Goals  Diet to advance >/=fulls in the next 24-48 hours     MONITORING AND EVALUATION:  Progress towards goals will be monitored and evaluated per protocol and Practice Guidelines    Christine Aldana RD, LD  Clinical Dietitian

## 2021-05-18 NOTE — PLAN OF CARE
Pertinent assessments: A&O. Denies pain and nausea. Tolerating clears, NPO at 0000. Ambulating independently. No signs of bleeding.   Major Shift Events IV iron given with no issues.   Treatment Plan: Hgb monitoring, IV Iron

## 2021-05-18 NOTE — PROVIDER NOTIFICATION
DATE:  5/18/2021   TIME OF RECEIPT FROM LAB:  0722  LAB TEST:  Hbg  LAB VALUE:  6.9  RESULTS GIVEN WITH READ-BACK TO (PROVIDER):  Dr. Suarez  TIME LAB VALUE REPORTED TO PROVIDER:  0722  Patient had conditional order to transfuse one unit of if the value is less than 7.

## 2021-05-18 NOTE — PLAN OF CARE
A/O denies pain or nausea. Working on bowel prep for scheduled colonoscopy today. Up with SBA. Using BSC independently. Pt states stools are liquid but not clear yet. Will plan to give Mag Citrate in AM.     Major Shift Events: Uneventful.     Treatment Plan: Bowel prep for colonoscopy today.  Hgb monitoring.

## 2021-05-18 NOTE — PRE-PROCEDURE
Pre-Endoscopy History and Physical     Yakov Kahn MRN# [id number]   YOB: 1960 Age: 60 year old     Date of Procedure: 5/16/2021  Primary care provider: Humberto Vora  Type of Endoscopy: colonoscopy  Reason for Procedure: melena  Type of Anesthesia Anticipated: Moderate (conscious) sedation    HPI:    Yakov is a 60 year old male who will be undergoing the above procedure.      A history and physical has been performed. The patient's medications and allergies have been reviewed. The risks and benefits of the procedure and the sedation options and risks were discussed with the patient.  All questions were answered and informed consent was obtained.      No Known Allergies     Current Facility-Administered Medications   Medication     glucose gel 15-30 g    Or     dextrose 50 % injection 25-50 mL    Or     glucagon injection 1 mg     lidocaine (LMX4) kit     lidocaine 1 % 0.1-1 mL     May continue current IV fluids if patient has IV fluids infusing.     May take regular AM medications except those listed below     naloxone (NARCAN) injection 0.2 mg     naloxone (NARCAN) injection 0.2 mg     naloxone (NARCAN) injection 0.4 mg     naloxone (NARCAN) injection 0.4 mg     pantoprazole (PROTONIX) EC tablet 40 mg     propranolol (INDERAL) tablet 10 mg     sodium chloride (PF) 0.9% PF flush 3 mL     sodium chloride (PF) 0.9% PF flush 3 mL       Patient Active Problem List   Diagnosis     Plantar fascial fibromatosis     CARDIOVASCULAR SCREENING; LDL GOAL LESS THAN 160     Essential hypertension     Upper GI bleed     Anemia due to blood loss, acute        No past medical history on file.     Past Surgical History:   Procedure Laterality Date     ESOPHAGOSCOPY, GASTROSCOPY, DUODENOSCOPY (EGD), COMBINED Left 5/17/2021    Procedure: ESOPHAGOGASTRODUODENOSCOPY (EGD);  Surgeon: Rey Zamora MD;  Location:  GI       Social History     Tobacco Use     Smoking status: Never Smoker     Smokeless  "tobacco: Former User   Substance Use Topics     Alcohol use: Yes       Family History   Problem Relation Age of Onset     Hypertension Brother      Diabetes No family hx of      Other Cancer No family hx of             Medications:     Medications Prior to Admission   Medication Sig Dispense Refill Last Dose     levocetirizine (XYZAL) 5 MG tablet Take 5 mg by mouth every evening   Past Week at Unknown time       Scheduled Medications:    pantoprazole  40 mg Oral QAM AC     propranolol  10 mg Oral TID     sodium chloride (PF)  3 mL Intracatheter Q8H       PRN:  glucose **OR** dextrose **OR** glucagon, lidocaine 4%, lidocaine (buffered or not buffered), - MEDICATION INSTRUCTIONS -, - MEDICATION INSTRUCTIONS -, naloxone, naloxone, naloxone, naloxone, sodium chloride (PF)    PHYSICAL EXAM:   /65   Pulse 56   Temp 97.7  F (36.5  C) (Temporal)   Resp 16   Ht 1.727 m (5' 8\")   Wt 90.5 kg (199 lb 8 oz)   SpO2 97%   BMI 30.33 kg/m   Estimated body mass index is 30.33 kg/m  as calculated from the following:    Height as of this encounter: 1.727 m (5' 8\").    Weight as of this encounter: 90.5 kg (199 lb 8 oz).   RESP: lungs clear to auscultation - no rales, rhonchi or wheezes  CV: regular rates and rhythm    IMPRESSION   ASA Class 2 - Mild systemic disease      Signed Electronically by: Rey Zamora MD  May 18, 2021    .            "

## 2021-05-18 NOTE — OR NURSING
Colonoscopy with polypectomy in endoscopy department.  Polyps and hemorrhoids not cause of GI bleed/anemia, will have video capsule tomorrow. Clear liquids only tomorrow. Report called to station nurse. Will observe pt in recovery/endo 30 minutes then send back to station on stretcher. VSS.

## 2021-05-18 NOTE — PLAN OF CARE
Pt A/O, denies pain. HR reg, pulses palpable, no edema. RA, no SOB, lungs clear/dim, sats adequate. BS hyperactive, no N/V, tolerating clears, NPO at MN for colonoscopy tomorrow. Completed Miralax, dark stools. Hgb 7.2. Voiding in BR independently and steady. VSS. PIV x2. No signs of withdrawal.

## 2021-05-18 NOTE — PLAN OF CARE
Pertinent assessments:  A/O denies pain or nausea. VSS. Clear LS.  Hbg of 6.9, one unit of Rbc transfused. Ambulates independently to the bathroom. Colonoscopy study completed today.    Major Shift Events: Uneventful.     Treatment Plan: Hgb monitoring.     Bedside Nurse: Julio Cesar Brothers RN

## 2021-05-18 NOTE — PROGRESS NOTES
[facility]  Hospitalist Progress Note  Reena Suarez MD 05/18/2021    Reason for Stay (Diagnosis): Anemia         Assessment and Plan:      Summary of Stay: Yakov aKhn is a 60 year old male with a history of daily etoh use. He presented to the hospital with complaints of fatigue and generalized weakness for the past several weeks.  He does not routinely seek out medical care.  Important social issues are that of drinking daily up to 4 drinks per day.      ER evaluation notable for tachycardia in the 100-110's, but BPs wnl.  Laboratory eval most notable for profound anemia with hgb 3.0.  He was also noted to have minimally elevated AST and direct t bili, NH3 of 40, and imaging suggesting cirrhosis of the liver.     He was admitted on 5/16/2021 for transfusion and w/u of newly diagnosed anemia.     Iron studies consistent with MIRANDA and MCV was low. He was admitted to the ICU for close observation and treated with PPI and octreotide gtt.  And transfused with a goal hgb of 7.0    He was treated with PPI and octreotide gtt/IVF, serial hgb, and transfusions.    There has been no evidence of ABL while hospitalized, GI consulted and he underwent EGD on 5/17/21 and found to have G1 varices without e/o bleeding.  He is to undergo Colonoscopy negative, VCE set up for tomorrow     He has rec'd a total of 5 unit(s) PRBCs thus far in his hospitalization     Problem List:   Profound Anemia 2/2 suspected GI source;  MIRANDA by iron studies.  Had some black tarry stools as well.  Clearly chronic given the stability of his VS.  -EGD with G1 varices but no evidence of bleeding and lack of hematemesis makes this an unlikely source.  Colonoscopy completed and notable for some polyps but no bleeding.   -video-capsule endoscopy tomorrow  -s/p 5 U PRBCs thus far  -will give IV venofer - discussed risks of benefits   -empiric PPI     Newly diagnosed cirrhosis without decompensation and complicated by grade 1 varices/internal  "hemorrhoids suggesting increased portal pressure.    -hepatitis serologies negative, therefore suspect related to alcohol consumption and potentially VILLARREAL   -he was given a dose of ceftriaxone which was discontinued yesterday given negative EGD  -propanolol started for elevated portal pressures (octreotide gtt stopped when EGD without significant or bleeding varices)  -AFB 5.6  -suspect he should f/u with hepatologist in OP setting   -given nodularity of liver radiology recommends MRI in outpatient setting.  My partner Dr Mark provided a copy of the CT report indicating recommendation for MRI for the patient and his spouse.  They will have their PCP arrange    GABINO during brief hospitalization 2/2 prerenal azotemia  -resolved    Hyperglycemia  hgb A1c 5.4 % so suspect just a stress reaction     Alcohol misuse disorder without e/o dependence or withdrawal    DVT Prophylaxis: Pneumatic Compression Devices  Code Status: Full Code  Functional Status: independent  Diet:  Regular texture  Ugarte:not needed  AccessPIV    Disposition Plan   Expected discharge in 1-2 days to prior living arrangement once GI evaluation is complete and hgb is stable .     Entered: Reena Suarez 05/18/2021, 8:46 AM               Interval History (Subjective):      Feels pretty good, lots better than on admission.  Is familiar with the cirrhotic diagnosis and plans to quit drinking.  No cp or sob.  We discussed IV iron and he is in agreement with proceeding                  Physical Exam:      Last Vital Signs:  /57 (BP Location: Right arm)   Pulse 63   Temp 98.4  F (36.9  C) (Oral)   Resp 20   Ht 1.727 m (5' 8\")   Wt 90.5 kg (199 lb 8 oz)   SpO2 98%   BMI 30.33 kg/m      Pleasant nad looks chronically ill in appearance.  Head nc/at sclera with mild icterus and he looks mildly jaundice to me. Lungs ctab nl effort rrr no mrg no le edema belly slightly rotund but s/nt  Alert and oriented affect appropriate villegas            Medications: "      All current medications were reviewed with changes reflected in problem list.         Data:      All new lab and imaging data was reviewed.   Labs:  Recent Labs   Lab 05/18/21  0653   WBC 9.7   HGB 6.9*   HCT 22.3*   MCV 81        Recent Labs   Lab 05/18/21  0653 05/17/21  0604 05/16/21  1234    132* 132*   POTASSIUM 3.8 3.7 3.7   CHLORIDE 105 103 101   CO2 25 22 18*   ANIONGAP 4 7 13   * 122* 244*   BUN 23 37* 36*   CR 1.14 1.29* 1.06   GFRESTIMATED 69 60* 76   GFRESTBLACK 80 69 88   DEXTER 7.5* 7.6* 8.1*   PROTTOTAL  --   --  5.6*   ALBUMIN  --   --  2.6*   BILITOTAL  --   --  1.9*   ALKPHOS  --   --  123   AST  --   --  67*   ALT  --   --  67     Recent Labs   Lab 05/18/21  0653 05/17/21  0830 05/17/21  0604 05/17/21  0315 05/16/21  2343 05/16/21  1234   *  --  122*  --   --  244*   BGM  --  146*  --  142* 137*  --      Recent Labs   Lab 05/16/21  1234   INR 1.50*     Recent Labs   Lab 05/16/21  1234   LIPASE 517*      Imaging:     Colonoscopy 5/18/21  Impression:               - Two 4 mm polyps in the ascending colon, removed                             with a cold snare. Resected and retrieved.                             - One 5 mm polyp in the transverse colon, removed                             with a cold snare. Resected and retrieved.                             - One 6 mm polyp in the rectum, removed with a cold                             snare. Resected and retrieved.                             - Internal hemorrhoids.   Recommendation:           Likely repeat exam in three years. No reason for                             melena seen. Will arrange a small bowel pill camera                             study.     EGD 5/17/21  Findings:        Grade I varices were found in the lower third of the esophagus. No        stigmata of recent bleeding. Z line 40 cm.        The stomach was normal.        The examined duodenum was normal.                                                                                      Impression:               - Grade I esophageal varices.                             - Normal stomach.                             - Normal examined duodenum.                             - No specimens collected.   Recommendation:           Would start a nonselective beta blocker - either                             nadolol or propranolol. Plan for colonoscopy                             tomorrow.

## 2021-05-19 LAB
ALBUMIN SERPL-MCNC: 2.2 G/DL (ref 3.4–5)
ALP SERPL-CCNC: 84 U/L (ref 40–150)
ALT SERPL W P-5'-P-CCNC: 147 U/L (ref 0–70)
ANION GAP SERPL CALCULATED.3IONS-SCNC: 6 MMOL/L (ref 3–14)
AST SERPL W P-5'-P-CCNC: 151 U/L (ref 0–45)
BILIRUB SERPL-MCNC: 3.5 MG/DL (ref 0.2–1.3)
BUN SERPL-MCNC: 12 MG/DL (ref 7–30)
CALCIUM SERPL-MCNC: 7.6 MG/DL (ref 8.5–10.1)
CHLORIDE SERPL-SCNC: 108 MMOL/L (ref 94–109)
CO2 SERPL-SCNC: 23 MMOL/L (ref 20–32)
COPATH REPORT: NORMAL
CREAT SERPL-MCNC: 1.17 MG/DL (ref 0.66–1.25)
ERYTHROCYTE [DISTWIDTH] IN BLOOD BY AUTOMATED COUNT: 20.4 % (ref 10–15)
GFR SERPL CREATININE-BSD FRML MDRD: 67 ML/MIN/{1.73_M2}
GLUCOSE SERPL-MCNC: 101 MG/DL (ref 70–99)
HCT VFR BLD AUTO: 25.4 % (ref 40–53)
HGB BLD-MCNC: 7.9 G/DL (ref 13.3–17.7)
MCH RBC QN AUTO: 25.3 PG (ref 26.5–33)
MCHC RBC AUTO-ENTMCNC: 31.1 G/DL (ref 31.5–36.5)
MCV RBC AUTO: 81 FL (ref 78–100)
PLATELET # BLD AUTO: 151 10E9/L (ref 150–450)
POTASSIUM SERPL-SCNC: 3.9 MMOL/L (ref 3.4–5.3)
PROT SERPL-MCNC: 4.9 G/DL (ref 6.8–8.8)
RBC # BLD AUTO: 3.12 10E12/L (ref 4.4–5.9)
SODIUM SERPL-SCNC: 137 MMOL/L (ref 133–144)
WBC # BLD AUTO: 8.4 10E9/L (ref 4–11)

## 2021-05-19 PROCEDURE — 36415 COLL VENOUS BLD VENIPUNCTURE: CPT | Performed by: INTERNAL MEDICINE

## 2021-05-19 PROCEDURE — 85027 COMPLETE CBC AUTOMATED: CPT | Performed by: INTERNAL MEDICINE

## 2021-05-19 PROCEDURE — 80053 COMPREHEN METABOLIC PANEL: CPT | Performed by: INTERNAL MEDICINE

## 2021-05-19 PROCEDURE — 250N000013 HC RX MED GY IP 250 OP 250 PS 637: Performed by: INTERNAL MEDICINE

## 2021-05-19 PROCEDURE — 99233 SBSQ HOSP IP/OBS HIGH 50: CPT | Performed by: INTERNAL MEDICINE

## 2021-05-19 PROCEDURE — 0DJ07ZZ INSPECTION OF UPPER INTESTINAL TRACT, VIA NATURAL OR ARTIFICIAL OPENING: ICD-10-PCS | Performed by: PHYSICIAN ASSISTANT

## 2021-05-19 PROCEDURE — 120N000001 HC R&B MED SURG/OB

## 2021-05-19 RX ADMIN — PROPRANOLOL HYDROCHLORIDE 10 MG: 10 TABLET ORAL at 09:00

## 2021-05-19 RX ADMIN — PANTOPRAZOLE SODIUM 40 MG: 40 TABLET, DELAYED RELEASE ORAL at 06:33

## 2021-05-19 RX ADMIN — PROPRANOLOL HYDROCHLORIDE 10 MG: 10 TABLET ORAL at 20:05

## 2021-05-19 RX ADMIN — PROPRANOLOL HYDROCHLORIDE 10 MG: 10 TABLET ORAL at 13:57

## 2021-05-19 ASSESSMENT — MIFFLIN-ST. JEOR: SCORE: 1695.32

## 2021-05-19 ASSESSMENT — ACTIVITIES OF DAILY LIVING (ADL)
ADLS_ACUITY_SCORE: 17

## 2021-05-19 NOTE — PLAN OF CARE
End of Shift Summary  For vital signs and complete assessments, please see documentation flowsheets.     Pertinent assessments: A&O. Denies pain, SOB and nausea. NPO at 0000. Ambulating independently. No signs of bleeding.     Major Shift Events:  uneventful    Treatment Plan: Hgb monitoring, IV Iron, Video capsul today    Bedside Nurse:  Ayaka Curran RN

## 2021-05-19 NOTE — PROGRESS NOTES
GI FOLLOWUP WITH CAPSULE PLACEMENT        - Small bowel video study in progress  - Capsule placed at 0800 ( this is a 12 hour study)   - Resume morning meds  - NPO until 1000, then clear liquids until 1200 then ADAT  - Encourage ambulation to help facilitate capsule movement.  - Please remove equipment at 2000 ( 8PM ). The machine will turn off once study complete.   - Capsule should pass in 24-48 hours, no need to retrieve  - NO MRI UNTIL CAPSULE PASSES  - Results will be sent to MD in 24 - 48 hrs    Eladia Harrison PA-C  [facility]

## 2021-05-19 NOTE — PROGRESS NOTES
[facility]    Medicine Progress Note - Hospitalist Service       Date of Admission:  5/16/2021  Assessment & Plan        Summary of Stay: Yakov Kahn is a 60 year old male with a history of daily etoh use. He presented to the hospital with complaints of fatigue and generalized weakness for the past several weeks.  He does not routinely seek out medical care.  Important social issues are that of drinking daily up to 4 drinks per day.       ER evaluation notable for tachycardia in the 100-110's, but BPs wnl.  Laboratory eval most notable for profound anemia with hgb 3.0.  He was also noted to have minimally elevated AST and direct t bili, NH3 of 40, and imaging suggesting cirrhosis of the liver.      He was admitted on 5/16/2021 for transfusion and w/u of newly diagnosed anemia.      Iron studies consistent with MIRANDA and MCV was low. He was admitted to the ICU for close observation and treated with PPI and octreotide gtt.  And transfused with a goal hgb of 7.0     He was treated with PPI and octreotide gtt/IVF, serial hgb, and transfusions.     There has been no evidence of ABL while hospitalized, GI consulted and he underwent EGD on 5/17/21 and found to have G1 varices without e/o bleeding.  He is to undergo Colonoscopy negative, VCE set up for today.      He has rec'd a total of 5 unit(s) PRBCs thus far in his hospitalization      Problem List:   Profound Anemia 2/2 suspected GI source;  MIRANDA by iron studies.  Had some black tarry stools as well.  Clearly chronic given the stability of his VS.  -EGD with G1 varices but no evidence of bleeding and lack of hematemesis makes this an unlikely source.  Colonoscopy completed and notable for some polyps but no bleeding.   -video-capsule endoscopy today  -s/p 5 U PRBCs thus far  -will give IV venofer - discussed risks of benefits   -empiric PPI      Newly diagnosed cirrhosis without decompensation and complicated by grade 1 varices/internal  hemorrhoids suggesting increased portal pressure.    -hepatitis serologies negative, therefore suspect related to alcohol consumption and potentially VILLARREAL   -he was given a dose of ceftriaxone which was discontinued yesterday given negative EGD  -propanolol started for elevated portal pressures (octreotide gtt stopped when EGD without significant or bleeding varices)  -AFB 5.6  -suspect he should f/u with hepatologist in OP setting   -given nodularity of liver radiology recommends MRI in outpatient setting.  My partner Dr Mark provided a copy of the CT report indicating recommendation for MRI for the patient and his spouse.  They will have their PCP arrange     GABINO during brief hospitalization 2/2 prerenal azotemia  -resolved     Hyperglycemia  hgb A1c 5.4 % so suspect just a stress reaction      Alcohol misuse disorder without e/o dependence or withdrawal       Diet: Advance Diet as Tolerated: Clear Liquid Diet; Regular Diet Adult    DVT Prophylaxis: Pneumatic Compression Devices  Ugarte Catheter: not present  Code Status: Full Code           Disposition Plan   Expected discharge: Tomorrow, recommended to prior living arrangement once hemoglobin stable.  Entered: Sivan Ibarra MD 05/19/2021, 11:45 AM       The patient's care was discussed with the Patient and Patient's Family.    Sivan Ibarra MD  Hospitalist Service  [facility]  Contact information available via [facility] Paging/Directory    ______________________________________________________________________    Interval History     No chest pain/SOB/N/V/melena/BRBPR.    Data reviewed today: I reviewed all medications, new labs and imaging results over the last 24 hours. I personally reviewed no images or EKG's today.    Physical Exam   Vital Signs: Temp: 98.2  F (36.8  C) Temp src: Oral BP: 114/59 Pulse: 64   Resp: 20 SpO2: 96 % O2 Device: None (Room air)    Weight: 200 lbs 12.8 oz    Gen - AAO x 3 in NAD.  Lungs - CTA B.  Heart -  RR,S1+S2 nml, no m/g/r.  Abd - soft, NT, ND, + BS.  Ext - 2+ edema.    Data   Recent Labs   Lab 05/19/21  0640 05/18/21  0653 05/17/21  2208 05/17/21  0604 05/17/21  0604 05/16/21  1234 05/16/21  1234   WBC 8.4 9.7  --   --  12.3*  --  17.2*   HGB 7.9* 6.9* 7.2*   < > 7.2*   < > 3.0*   MCV 81 81  --   --  81  --  70*    155  --   --  169  --  274   INR  --   --   --   --   --   --  1.50*    134  --   --  132*  --  132*   POTASSIUM 3.9 3.8  --   --  3.7  --  3.7   CHLORIDE 108 105  --   --  103  --  101   CO2 23 25  --   --  22  --  18*   BUN 12 23  --   --  37*  --  36*   CR 1.17 1.14  --   --  1.29*  --  1.06   ANIONGAP 6 4  --   --  7  --  13   DEXTER 7.6* 7.5*  --   --  7.6*  --  8.1*   * 112*  --   --  122*  --  244*   ALBUMIN 2.2*  --   --   --   --   --  2.6*   PROTTOTAL 4.9*  --   --   --   --   --  5.6*   BILITOTAL 3.5*  --   --   --   --   --  1.9*   ALKPHOS 84  --   --   --   --   --  123   *  --   --   --   --   --  67   *  --   --   --   --   --  67*   LIPASE  --   --   --   --   --   --  517*    < > = values in this interval not displayed.     No results found for this or any previous visit (from the past 24 hour(s)).  Medications     - MEDICATION INSTRUCTIONS -       - MEDICATION INSTRUCTIONS -       - MEDICATION INSTRUCTIONS -         iron sucrose (VENOFER) intermittent infusion  300 mg Intravenous Q72H     pantoprazole  40 mg Oral QAM AC     propranolol  10 mg Oral TID     sodium chloride (PF)  3 mL Intracatheter Q8H

## 2021-05-20 VITALS
OXYGEN SATURATION: 99 % | WEIGHT: 200.8 LBS | HEIGHT: 68 IN | TEMPERATURE: 98.9 F | DIASTOLIC BLOOD PRESSURE: 53 MMHG | RESPIRATION RATE: 18 BRPM | SYSTOLIC BLOOD PRESSURE: 106 MMHG | HEART RATE: 71 BPM | BODY MASS INDEX: 30.43 KG/M2

## 2021-05-20 LAB
BASOPHILS # BLD AUTO: 0.1 10E9/L (ref 0–0.2)
BASOPHILS NFR BLD AUTO: 0.7 %
DIFFERENTIAL METHOD BLD: ABNORMAL
EOSINOPHIL # BLD AUTO: 0.2 10E9/L (ref 0–0.7)
EOSINOPHIL NFR BLD AUTO: 2 %
ERYTHROCYTE [DISTWIDTH] IN BLOOD BY AUTOMATED COUNT: 21.5 % (ref 10–15)
HCT VFR BLD AUTO: 26.4 % (ref 40–53)
HGB BLD-MCNC: 8.2 G/DL (ref 13.3–17.7)
IMM GRANULOCYTES # BLD: 0.1 10E9/L (ref 0–0.4)
IMM GRANULOCYTES NFR BLD: 0.6 %
LYMPHOCYTES # BLD AUTO: 1.2 10E9/L (ref 0.8–5.3)
LYMPHOCYTES NFR BLD AUTO: 12.1 %
MCH RBC QN AUTO: 25.6 PG (ref 26.5–33)
MCHC RBC AUTO-ENTMCNC: 31.1 G/DL (ref 31.5–36.5)
MCV RBC AUTO: 83 FL (ref 78–100)
MONOCYTES # BLD AUTO: 1.7 10E9/L (ref 0–1.3)
MONOCYTES NFR BLD AUTO: 16.8 %
NEUTROPHILS # BLD AUTO: 6.8 10E9/L (ref 1.6–8.3)
NEUTROPHILS NFR BLD AUTO: 67.8 %
NRBC # BLD AUTO: 0 10*3/UL
NRBC BLD AUTO-RTO: 0 /100
PLATELET # BLD AUTO: 166 10E9/L (ref 150–450)
RBC # BLD AUTO: 3.2 10E12/L (ref 4.4–5.9)
WBC # BLD AUTO: 10 10E9/L (ref 4–11)

## 2021-05-20 PROCEDURE — 250N000013 HC RX MED GY IP 250 OP 250 PS 637: Performed by: INTERNAL MEDICINE

## 2021-05-20 PROCEDURE — 36415 COLL VENOUS BLD VENIPUNCTURE: CPT | Performed by: INTERNAL MEDICINE

## 2021-05-20 PROCEDURE — 99239 HOSP IP/OBS DSCHRG MGMT >30: CPT | Performed by: INTERNAL MEDICINE

## 2021-05-20 PROCEDURE — 85025 COMPLETE CBC W/AUTO DIFF WBC: CPT | Performed by: INTERNAL MEDICINE

## 2021-05-20 RX ORDER — PANTOPRAZOLE SODIUM 40 MG/1
40 TABLET, DELAYED RELEASE ORAL
Qty: 30 TABLET | Refills: 0 | Status: SHIPPED | OUTPATIENT
Start: 2021-05-21 | End: 2021-06-18

## 2021-05-20 RX ORDER — FERROUS SULFATE 325(65) MG
325 TABLET ORAL 2 TIMES DAILY
Qty: 60 TABLET | Refills: 0 | Status: SHIPPED | OUTPATIENT
Start: 2021-05-20 | End: 2021-06-19

## 2021-05-20 RX ORDER — PROPRANOLOL HYDROCHLORIDE 10 MG/1
10 TABLET ORAL 3 TIMES DAILY
Qty: 90 TABLET | Refills: 0 | Status: SHIPPED | OUTPATIENT
Start: 2021-05-20 | End: 2021-05-28

## 2021-05-20 RX ADMIN — PANTOPRAZOLE SODIUM 40 MG: 40 TABLET, DELAYED RELEASE ORAL at 06:55

## 2021-05-20 RX ADMIN — PROPRANOLOL HYDROCHLORIDE 10 MG: 10 TABLET ORAL at 09:51

## 2021-05-20 ASSESSMENT — ACTIVITIES OF DAILY LIVING (ADL)
ADLS_ACUITY_SCORE: 17

## 2021-05-20 NOTE — PLAN OF CARE
End of Shift Summary  For vital signs and complete assessments, please see documentation flowsheets.     Pertinent assessments: VSS.  A&O.  Denies pain, nausea and SOB.   Up independently.     Major Shift Events:  uneventful    Treatment Plan: Hgb monitoring, IV Iron    Bedside Nurse:  Ayaka Curran RN

## 2021-05-20 NOTE — PLAN OF CARE
Pt hospitalized for GI bleed.  Discharge education provided to patient and patient's wife.  Patient verbalized understanding of medications and orders.  Medications filled at [facility] pharmacy and sent with patient at discharge.   Pt verbalized will follow up with primary care provider and GI.  Patient discharging to home  And transportation provided by wife. No further questions at this time.

## 2021-05-20 NOTE — PROGRESS NOTES
"GASTROENTEROLOGY PROGRESS NOTE     SUBJECTIVE:  Last BM, bloody yesterday am. None since that time. Denies nausea, vomiting, abdominal pain. Tolerating regular diet.      OBJECTIVE:  /53 (BP Location: Right arm)   Pulse 71   Temp 98.9  F (37.2  C) (Oral)   Resp 18   Ht 1.727 m (5' 8\")   Wt 91.1 kg (200 lb 12.8 oz)   SpO2 99%   BMI 30.53 kg/m    Temp (24hrs), Av.4  F (36.9  C), Min:97.3  F (36.3  C), Max:99.2  F (37.3  C)    Patient Vitals for the past 72 hrs:   Weight   21 0647 91.1 kg (200 lb 12.8 oz)   21 0550 90.5 kg (199 lb 8 oz)       Intake/Output Summary (Last 24 hours) at 2021 0951  Last data filed at 2021 1800  Gross per 24 hour   Intake 720 ml   Output --   Net 720 ml        PHYSICAL EXAM:  Gen: alert, oriented, NAD  Abd: soft, NT/ND, +Bs           Additional Comments:  ROS, FH, SH: See initial GI consult for details.     I have reviewed the patient's new clinical lab results:     Recent Labs   Lab Test 21  0708 21  0640 21  0653 21  1234 21  1234   WBC 10.0 8.4 9.7   < > 17.2*   HGB 8.2* 7.9* 6.9*   < > 3.0*   MCV 83 81 81   < > 70*    151 155   < > 274   INR  --   --   --   --  1.50*    < > = values in this interval not displayed.     Recent Labs   Lab Test 21  0640 21  0653 21  0604   POTASSIUM 3.9 3.8 3.7   CHLORIDE 108 105 103   CO2 23 25 22   BUN 12 23 37*   ANIONGAP 6 4 7     Recent Labs   Lab Test 21  0640 21  1319 21  1234   ALBUMIN 2.2*  --  2.6*   BILITOTAL 3.5*  --  1.9*   *  --  67   *  --  67*   PROTEIN  --  Negative  --    LIPASE  --   --  517*     Assessment/Plan:  60 year old male with history of daily ETOH use admitted  with symptoms of fatigue, weakness for several weeks with workup notable for profound microcytic anemia (HGB 3) and elevated liver enzymes with imaging suggestive of liver cirrhosis.     1. Anemia. Consistent with MIRANDA on iron studies. EGD showed " nonbleeding esophageal varices and colonoscopy with 4 adenomatous polyps and hemorrhoids, no active bleeding. Without significant hematemesis does not clinically appear consistent with variceal bleed. Due to this, PillCam performed yesterday with results pending. Has received total 5 units of blood. Receiving IV Venofer. Initially received octreotide/ceftriaxone but discontinued. On propranolol. Has not had significant GI bleeding this admission. Most recent stool yesterday am. Hemodynamically stable. HGB stable.     --Diet as tolerated.   --Continue PPI.   --Our office will contact patient with PillCam results.   --Continue propranolol.   --Ok to discharge from GI standpoint.     2. Liver cirrhosis.  Likely related to ETOH and fatty liver disease. History of chronic daily alcohol use. Noted on imaging with EGD documenting esophageal varices. Associated splenomegaly. Hepatitis serologies negative. Nodularity of the liver with recommendations for follow up liver MRI. AFP normal. Very minimal ascites. No encephalopathy. INR 1.5. Total bili 3.5 with mildly elevated transaminases. Creatinine initially elevated, normalized.     --Outpatient liver MRI to further characterize liver/assess for liver lesions.   --Avoidance of alcohol recommended.   --Will arrange outpatient hepatology clinic follow up.     Katie Ramírez, PAC  [facility] ([facility])

## 2021-05-20 NOTE — PLAN OF CARE
Pt up ind. VSS Swallowed pill cam @ 0800. On regular diet now, tolerating, denies nausea. Had 1 bright red bloody stool. Denies any sx. GI following. On Protonix & Propanolol. Plan for possible discharge tomorrow.

## 2021-05-21 ENCOUNTER — TRANSFERRED RECORDS (OUTPATIENT)
Dept: HEALTH INFORMATION MANAGEMENT | Facility: CLINIC | Age: 61
End: 2021-05-21

## 2021-05-27 ENCOUNTER — TRANSFERRED RECORDS (OUTPATIENT)
Dept: HEALTH INFORMATION MANAGEMENT | Facility: CLINIC | Age: 61
End: 2021-05-27

## 2021-05-27 NOTE — PROGRESS NOTES
"    Assessment & Plan     Alcoholic cirrhosis, unspecified whether ascites present (H)  Christiano is thankfully doing well following his hospitalization. He does have follow up planned with GI next week. Per reports we'll need to further update imaging of his liver and dennis ordered this though hoping GI will also be involved. He has stopped his ETOH use. He is tolerating food and fluids. He is a bit hypotensive today so ill cut his propranolol to twice daily. Continue with the iron supplementation and follow up with me in 3 months, sooner with GI  - MR Abdomen w/o & w Contrast; Future  - Comprehensive metabolic panel (BMP + Alb, Alk Phos, ALT, AST, Total. Bili, TP)  - propranolol (INDERAL) 10 MG tablet; Take 1 tablet (10 mg) by mouth 2 times daily    Anemia due to blood loss, acute  Likely 2/2 his variceals. Trending.   - CBC with platelets and differential  - Comprehensive metabolic panel (BMP + Alb, Alk Phos, ALT, AST, Total. Bili, TP)    Enlarged prostate  I noted this on the imaging during his hospitalization. Updating PSA  - Prostate spec antigen screen     BMI:   Estimated body mass index is 30.43 kg/m  as calculated from the following:    Height as of this encounter: 1.727 m (5' 8\").    Weight as of this encounter: 90.8 kg (200 lb 1.6 oz).     Return in about 3 months (around 8/28/2021) for Follow up.    Humberto Vora PA-C  [facility] [place]    Subjective   Christiano is a 60 year old who presents for the following health issues  accompanied by his spouse:    [facility]       Hospital Follow-up Visit:    Hospital/Nursing Home/IP Rehab Facility: [facility]  Date of Admission: 5/16  Date of Discharge: 5/20  Reason(s) for Admission:  cirrhosis of the liver      Was your hospitalization related to COVID-19? No   Problems taking medications regularly:  None  Medication changes since discharge: Added Protonix and inderal  Problems adhering to non-medication therapy:  None    Yakov Kahn is a 60 " "year old male who presents today for HOSPITAL follow up   Hospitalized for ABL with profound anemia;   Noted a slow fatigue over time but then hit quickly just prior to hospitalization    He is feeling a lot better than before he went in  Energy levels can still be improved  Trying to push fluids of water    They have some questions to review about follow up, labs, imaging, specialty etc      Summary of hospitalization:  [facility] discharge summary reviewed  Diagnostic Tests/Treatments reviewed.  Follow up needed: CBC, CMP  Other Healthcare Providers Involved in Patient s Care:         Specialist appointment - Hepatology  Update since discharge: improved.       Post Discharge Medication Reconciliation: discharge medications reconciled and changed, per note/orders.  Plan of care communicated with patient and spouse              Review of Systems   Constitutional, HEENT, cardiovascular, pulmonary, gi and gu systems are negative, except as otherwise noted.      Objective    BP 98/54 (BP Location: Right arm, Patient Position: Chair, Cuff Size: Adult Regular)   Pulse 59   Temp 97.8  F (36.6  C) (Oral)   Resp 16   Ht 1.727 m (5' 8\")   Wt 90.8 kg (200 lb 1.6 oz)   SpO2 99%   BMI 30.43 kg/m    Body mass index is 30.43 kg/m .  Physical Exam   GENERAL: healthy, alert and no distress  EYES: Eyes grossly normal to inspection; mild conjunctival pallor  RESP: lungs clear to auscultation - no rales, rhonchi or wheezes  CV: regular rates and rhythm  ABDOMEN: there is still fluid present in the abdomen; it is soft  MS: No peripheral edema   SKIN: no suspicious lesions or rashes  NEURO: Normal strength and tone, mentation intact and speech normal  PSYCH: mentation appears normal, affect normal/bright    Labs reviewed from ED  Discuss imaging follow up with GI;   Reviewed incidentals on imaging; follow up psa          "

## 2021-05-28 ENCOUNTER — OFFICE VISIT (OUTPATIENT)
Dept: FAMILY MEDICINE | Facility: CLINIC | Age: 61
End: 2021-05-28
Payer: COMMERCIAL

## 2021-05-28 VITALS
OXYGEN SATURATION: 99 % | DIASTOLIC BLOOD PRESSURE: 54 MMHG | TEMPERATURE: 97.8 F | WEIGHT: 200.1 LBS | BODY MASS INDEX: 30.33 KG/M2 | SYSTOLIC BLOOD PRESSURE: 98 MMHG | HEART RATE: 59 BPM | HEIGHT: 68 IN | RESPIRATION RATE: 16 BRPM

## 2021-05-28 DIAGNOSIS — N40.0 ENLARGED PROSTATE: ICD-10-CM

## 2021-05-28 DIAGNOSIS — D62 ANEMIA DUE TO BLOOD LOSS, ACUTE: ICD-10-CM

## 2021-05-28 DIAGNOSIS — K70.30 ALCOHOLIC CIRRHOSIS, UNSPECIFIED WHETHER ASCITES PRESENT (H): Primary | ICD-10-CM

## 2021-05-28 LAB
ALBUMIN SERPL-MCNC: 2.6 G/DL (ref 3.4–5)
ALP SERPL-CCNC: 175 U/L (ref 40–150)
ALT SERPL W P-5'-P-CCNC: 68 U/L (ref 0–70)
ANION GAP SERPL CALCULATED.3IONS-SCNC: 3 MMOL/L (ref 3–14)
ANISOCYTOSIS BLD QL SMEAR: ABNORMAL
AST SERPL W P-5'-P-CCNC: 76 U/L (ref 0–45)
BASOPHILS # BLD AUTO: 0 10E9/L (ref 0–0.2)
BASOPHILS NFR BLD AUTO: 0.5 %
BILIRUB SERPL-MCNC: 1.5 MG/DL (ref 0.2–1.3)
BUN SERPL-MCNC: 11 MG/DL (ref 7–30)
CALCIUM SERPL-MCNC: 8.8 MG/DL (ref 8.5–10.1)
CHLORIDE SERPL-SCNC: 112 MMOL/L (ref 94–109)
CO2 SERPL-SCNC: 25 MMOL/L (ref 20–32)
CREAT SERPL-MCNC: 1.04 MG/DL (ref 0.66–1.25)
DIFFERENTIAL METHOD BLD: ABNORMAL
EOSINOPHIL # BLD AUTO: 0.1 10E9/L (ref 0–0.7)
EOSINOPHIL NFR BLD AUTO: 2.2 %
ERYTHROCYTE [DISTWIDTH] IN BLOOD BY AUTOMATED COUNT: 23.7 % (ref 10–15)
GFR SERPL CREATININE-BSD FRML MDRD: 77 ML/MIN/{1.73_M2}
GLUCOSE SERPL-MCNC: 171 MG/DL (ref 70–99)
HCT VFR BLD AUTO: 32.5 % (ref 40–53)
HGB BLD-MCNC: 9.7 G/DL (ref 13.3–17.7)
HYPOCHROMIA BLD QL: PRESENT
LYMPHOCYTES # BLD AUTO: 0.9 10E9/L (ref 0.8–5.3)
LYMPHOCYTES NFR BLD AUTO: 14.8 %
MCH RBC QN AUTO: 26.6 PG (ref 26.5–33)
MCHC RBC AUTO-ENTMCNC: 29.8 G/DL (ref 31.5–36.5)
MCV RBC AUTO: 89 FL (ref 78–100)
MONOCYTES # BLD AUTO: 0.5 10E9/L (ref 0–1.3)
MONOCYTES NFR BLD AUTO: 9.1 %
NEUTROPHILS # BLD AUTO: 4.5 10E9/L (ref 1.6–8.3)
NEUTROPHILS NFR BLD AUTO: 73.4 %
PLATELET # BLD AUTO: 193 10E9/L (ref 150–450)
POTASSIUM SERPL-SCNC: 4.7 MMOL/L (ref 3.4–5.3)
PROT SERPL-MCNC: 6.7 G/DL (ref 6.8–8.8)
PSA SERPL-ACNC: 1.48 UG/L (ref 0–4)
RBC # BLD AUTO: 3.65 10E12/L (ref 4.4–5.9)
SODIUM SERPL-SCNC: 140 MMOL/L (ref 133–144)
WBC # BLD AUTO: 6 10E9/L (ref 4–11)

## 2021-05-28 PROCEDURE — 36415 COLL VENOUS BLD VENIPUNCTURE: CPT | Performed by: PHYSICIAN ASSISTANT

## 2021-05-28 PROCEDURE — 99495 TRANSJ CARE MGMT MOD F2F 14D: CPT | Performed by: PHYSICIAN ASSISTANT

## 2021-05-28 PROCEDURE — 80053 COMPREHEN METABOLIC PANEL: CPT | Performed by: PHYSICIAN ASSISTANT

## 2021-05-28 PROCEDURE — 85025 COMPLETE CBC W/AUTO DIFF WBC: CPT | Performed by: PHYSICIAN ASSISTANT

## 2021-05-28 PROCEDURE — G0103 PSA SCREENING: HCPCS | Performed by: PHYSICIAN ASSISTANT

## 2021-05-28 RX ORDER — PROPRANOLOL HYDROCHLORIDE 10 MG/1
10 TABLET ORAL 2 TIMES DAILY
Qty: 90 TABLET | Refills: 0
Start: 2021-05-28 | End: 2021-06-18

## 2021-05-28 ASSESSMENT — MIFFLIN-ST. JEOR: SCORE: 1692.15

## 2021-05-28 ASSESSMENT — PAIN SCALES - GENERAL: PAINLEVEL: NO PAIN (0)

## 2021-05-28 NOTE — PATIENT INSTRUCTIONS
You can call [phone] to schedule your imaging at [facility].     Take less propranolol - now just twice daily

## 2021-06-05 ENCOUNTER — HOSPITAL ENCOUNTER (EMERGENCY)
Facility: CLINIC | Age: 61
Discharge: HOME | End: 2021-06-05
Attending: EMERGENCY MEDICINE | Admitting: EMERGENCY MEDICINE
Payer: COMMERCIAL

## 2021-06-05 VITALS
HEART RATE: 62 BPM | RESPIRATION RATE: 16 BRPM | DIASTOLIC BLOOD PRESSURE: 65 MMHG | HEIGHT: 68 IN | WEIGHT: 200 LBS | SYSTOLIC BLOOD PRESSURE: 124 MMHG | BODY MASS INDEX: 30.31 KG/M2 | OXYGEN SATURATION: 99 % | TEMPERATURE: 98 F

## 2021-06-05 DIAGNOSIS — D50.9 IRON DEFICIENCY ANEMIA, UNSPECIFIED IRON DEFICIENCY ANEMIA TYPE: ICD-10-CM

## 2021-06-05 DIAGNOSIS — R53.83 OTHER FATIGUE: ICD-10-CM

## 2021-06-05 LAB
ALBUMIN SERPL-MCNC: 2.6 G/DL (ref 3.4–5)
ALP SERPL-CCNC: 129 U/L (ref 40–150)
ALT SERPL W P-5'-P-CCNC: 49 U/L (ref 0–70)
ANION GAP SERPL CALCULATED.3IONS-SCNC: 7 MMOL/L (ref 3–14)
AST SERPL W P-5'-P-CCNC: 59 U/L (ref 0–45)
BASOPHILS # BLD AUTO: 0 10E9/L (ref 0–0.2)
BASOPHILS NFR BLD AUTO: 0.5 %
BILIRUB SERPL-MCNC: 1.7 MG/DL (ref 0.2–1.3)
BUN SERPL-MCNC: 11 MG/DL (ref 7–30)
CALCIUM SERPL-MCNC: 8.8 MG/DL (ref 8.5–10.1)
CHLORIDE SERPL-SCNC: 109 MMOL/L (ref 94–109)
CO2 SERPL-SCNC: 21 MMOL/L (ref 20–32)
CREAT SERPL-MCNC: 1.05 MG/DL (ref 0.66–1.25)
DIFFERENTIAL METHOD BLD: ABNORMAL
EOSINOPHIL # BLD AUTO: 0.1 10E9/L (ref 0–0.7)
EOSINOPHIL NFR BLD AUTO: 2 %
ERYTHROCYTE [DISTWIDTH] IN BLOOD BY AUTOMATED COUNT: 22.8 % (ref 10–15)
GFR SERPL CREATININE-BSD FRML MDRD: 76 ML/MIN/{1.73_M2}
GLUCOSE SERPL-MCNC: 199 MG/DL (ref 70–99)
HCT VFR BLD AUTO: 35.7 % (ref 40–53)
HGB BLD-MCNC: 11.2 G/DL (ref 13.3–17.7)
IMM GRANULOCYTES # BLD: 0 10E9/L (ref 0–0.4)
IMM GRANULOCYTES NFR BLD: 0.3 %
INR PPP: 1.28 (ref 0.86–1.14)
LYMPHOCYTES # BLD AUTO: 0.6 10E9/L (ref 0.8–5.3)
LYMPHOCYTES NFR BLD AUTO: 10.5 %
MCH RBC QN AUTO: 28.3 PG (ref 26.5–33)
MCHC RBC AUTO-ENTMCNC: 31.4 G/DL (ref 31.5–36.5)
MCV RBC AUTO: 90 FL (ref 78–100)
MONOCYTES # BLD AUTO: 0.5 10E9/L (ref 0–1.3)
MONOCYTES NFR BLD AUTO: 8.5 %
NEUTROPHILS # BLD AUTO: 4.8 10E9/L (ref 1.6–8.3)
NEUTROPHILS NFR BLD AUTO: 78.2 %
NRBC # BLD AUTO: 0 10*3/UL
NRBC BLD AUTO-RTO: 0 /100
PLATELET # BLD AUTO: 163 10E9/L (ref 150–450)
POTASSIUM SERPL-SCNC: 3.8 MMOL/L (ref 3.4–5.3)
PROT SERPL-MCNC: 7.1 G/DL (ref 6.8–8.8)
RBC # BLD AUTO: 3.96 10E12/L (ref 4.4–5.9)
SODIUM SERPL-SCNC: 137 MMOL/L (ref 133–144)
WBC # BLD AUTO: 6.1 10E9/L (ref 4–11)

## 2021-06-05 PROCEDURE — 80053 COMPREHEN METABOLIC PANEL: CPT | Performed by: EMERGENCY MEDICINE

## 2021-06-05 PROCEDURE — 96360 HYDRATION IV INFUSION INIT: CPT

## 2021-06-05 PROCEDURE — 85610 PROTHROMBIN TIME: CPT | Performed by: EMERGENCY MEDICINE

## 2021-06-05 PROCEDURE — 99283 EMERGENCY DEPT VISIT LOW MDM: CPT | Mod: 25

## 2021-06-05 PROCEDURE — 85025 COMPLETE CBC W/AUTO DIFF WBC: CPT | Performed by: EMERGENCY MEDICINE

## 2021-06-05 PROCEDURE — 258N000003 HC RX IP 258 OP 636: Performed by: EMERGENCY MEDICINE

## 2021-06-05 RX ADMIN — SODIUM CHLORIDE 1000 ML: 9 INJECTION, SOLUTION INTRAVENOUS at 09:41

## 2021-06-05 ASSESSMENT — ENCOUNTER SYMPTOMS
DIARRHEA: 1
BLOOD IN STOOL: 0
SHORTNESS OF BREATH: 0
FATIGUE: 1
FEVER: 0

## 2021-06-05 ASSESSMENT — MIFFLIN-ST. JEOR: SCORE: 1691.69

## 2021-06-05 NOTE — ED PROVIDER NOTES
"  History     Chief Complaint:  Fatigue     HPI   Yakov Kahn is a 60 year old male with history of alcohol abuse, hypertension, cirrhosis of liver, and GI bleed who presents with fatigue. The patient reports that he has been experiencing increasing levels of fatigue over the last few days. He has also had some diarrhea. The patient denies black stools, fever, chest pain, shortness of breath, alcohol use, or known sick contacts.     Of note, the patient was admitted to the hospital on 05/16/2021. He was diagnosed with profound anemia (hgb 3.0) with suspected upper GI source. He has not drank alcohol since this admission.     Review of Systems   Constitutional: Positive for fatigue. Negative for fever.   Respiratory: Negative for shortness of breath.    Cardiovascular: Negative for chest pain.   Gastrointestinal: Positive for diarrhea. Negative for blood in stool.   All other systems reviewed and are negative.    Allergies:  No known allergies    Medications:  Ferrous sulfate  Xyzal  Pantoprazole  Propranolol    Past Medical History:    Alcohol abuse  Hypertension  Cirrhosis of liver  Esophageal varices  GI bleed  Anemia    Past Surgical History:    EGD    Family History:    Hypertension, brother    Social History:  Arrives via car  Accompanied by a family member    Physical Exam     Patient Vitals for the past 24 hrs:   BP Temp Temp src Pulse Resp SpO2 Height Weight   06/05/21 1045 -- -- -- -- -- 99 % -- --   06/05/21 1030 -- -- -- -- -- 96 % -- --   06/05/21 1015 -- -- -- -- -- 95 % -- --   06/05/21 1000 -- -- -- -- -- 96 % -- --   06/05/21 0949 -- -- -- -- -- 97 % -- --   06/05/21 0945 -- -- -- -- -- 96 % -- --   06/05/21 0930 -- -- -- -- -- 97 % -- --   06/05/21 0915 124/65 98  F (36.7  C) Oral 62 16 99 % 1.727 m (5' 8\") 90.7 kg (200 lb)   06/05/21 0851 119/85 96.9  F (36.1  C) Temporal 71 16 99 % -- --     Physical Exam  Nursing note and vitals reviewed.  Constitutional: Tired appearing. Cooperative  HENT: "   Mouth/Throat: Mucous membranes are normal.   Cardiovascular: Normal rate, regular rhythm and normal heart sounds.  No murmur.  Pulmonary/Chest: Effort normal and breath sounds normal. No respiratory distress. No wheezes. No rales.   Abdominal: Soft. Normal appearance and bowel sounds are normal. No distension. There is no tenderness.   Neurological: Alert. Oriented x4.  Strength normal.   Skin: Skin is warm and dry.   Psychiatric: Normal mood and affect.     Emergency Department Course     Laboratory:   CBC: WBC 6.1, HGB 11.2 (L),   CMP: glucose 199 (H), bilirubin 1.7 (H), albumin 2.6 (L), AST 59 (H) o/w WNL (Creatinine 1.05)     INR: 1.28 (H)    Reviewed:  I reviewed nursing notes, vitals and past medical history    Assessments:  0913 I obtained history and examined the patient as noted above.   1017 I rechecked the patient and explained findings.     Interventions:  0941 Normal Saline 1000 mL IV    Disposition:  The patient was discharged to home.     Impression & Plan     Medical Decision Making:  Yakov Kahn is a 60 year old who presents with generalized fatigue. He had a significant admission for acute on chronic blood loss anemia secondary to a presumed upper GI bleed secondary to his alcoholism. Discharge summaries were reviewed. He has had no ongoing hematochezia or melana. His hemoglobin is 11.2 up from his discharge level at 9. His exam is otherwise reassuring. No fevers or concern for infectious etiology. He and his female  are reassured with the test results and simply wanted to get these checked as they were very concerned given his recent admission. He will be discharged home to continue outpatient follow up care.       Diagnosis:    ICD-10-CM    1. Other fatigue  R53.83    2. Iron deficiency anemia - improving  D50.9      Scribe Disclosure:  I, George Schmidt, am serving as a scribe at 9:13 AM on 6/5/2021 to document services personally performed by Augie Brooke MD based on my  observations and the provider's statements to me.            Augie Brooke MD  06/05/21 [id number]

## 2021-06-05 NOTE — ED TRIAGE NOTES
Pt presents to ED with c/o fatigue. Pt has been having diarrhea since yesterday and feeling tired. Pt has history of anemia and has been worked up for GI bleed - has had endoscopy, colonoscopy, and swallowed camera pill. Source of bleeding has not been identified. ABC Intact. A/O x4.

## 2021-06-15 ENCOUNTER — HOSPITAL ENCOUNTER (OUTPATIENT)
Dept: MRI IMAGING | Facility: CLINIC | Age: 61
Discharge: HOME | End: 2021-06-15
Attending: PHYSICIAN ASSISTANT | Admitting: PHYSICIAN ASSISTANT
Payer: COMMERCIAL

## 2021-06-15 DIAGNOSIS — K70.30 ALCOHOLIC CIRRHOSIS, UNSPECIFIED WHETHER ASCITES PRESENT (H): ICD-10-CM

## 2021-06-15 PROCEDURE — A9585 GADOBUTROL INJECTION: HCPCS | Performed by: PHYSICIAN ASSISTANT

## 2021-06-15 PROCEDURE — 255N000002 HC RX 255 OP 636: Performed by: PHYSICIAN ASSISTANT

## 2021-06-15 PROCEDURE — 74183 MRI ABD W/O CNTR FLWD CNTR: CPT

## 2021-06-15 RX ORDER — GADOBUTROL 604.72 MG/ML
15 INJECTION INTRAVENOUS ONCE
Status: COMPLETED | OUTPATIENT
Start: 2021-06-15 | End: 2021-06-15

## 2021-06-15 RX ADMIN — GADOBUTROL 10 ML: 604.72 INJECTION INTRAVENOUS at 16:31

## 2021-06-17 ENCOUNTER — HOSPITAL ENCOUNTER (EMERGENCY)
Facility: CLINIC | Age: 61
Discharge: HOME | End: 2021-06-17
Attending: PHYSICIAN ASSISTANT | Admitting: PHYSICIAN ASSISTANT
Payer: COMMERCIAL

## 2021-06-17 VITALS
TEMPERATURE: 97.8 F | OXYGEN SATURATION: 97 % | RESPIRATION RATE: 16 BRPM | SYSTOLIC BLOOD PRESSURE: 126 MMHG | HEART RATE: 77 BPM | DIASTOLIC BLOOD PRESSURE: 73 MMHG

## 2021-06-17 DIAGNOSIS — K70.9 ALCOHOLIC LIVER DISEASE (H): ICD-10-CM

## 2021-06-17 DIAGNOSIS — R19.7 DIARRHEA, UNSPECIFIED TYPE: ICD-10-CM

## 2021-06-17 LAB
ALBUMIN SERPL-MCNC: 2.7 G/DL (ref 3.4–5)
ALBUMIN UR-MCNC: NEGATIVE MG/DL
ALP SERPL-CCNC: 108 U/L (ref 40–150)
ALT SERPL W P-5'-P-CCNC: 36 U/L (ref 0–70)
ANION GAP SERPL CALCULATED.3IONS-SCNC: 7 MMOL/L (ref 3–14)
APPEARANCE UR: CLEAR
AST SERPL W P-5'-P-CCNC: 40 U/L (ref 0–45)
BASOPHILS # BLD AUTO: 0 10E9/L (ref 0–0.2)
BASOPHILS NFR BLD AUTO: 0.3 %
BILIRUB SERPL-MCNC: 3.3 MG/DL (ref 0.2–1.3)
BILIRUB UR QL STRIP: NEGATIVE
BUN SERPL-MCNC: 15 MG/DL (ref 7–30)
C DIFF TOX B STL QL: POSITIVE
CALCIUM SERPL-MCNC: 8.9 MG/DL (ref 8.5–10.1)
CHLORIDE SERPL-SCNC: 105 MMOL/L (ref 94–109)
CO2 SERPL-SCNC: 23 MMOL/L (ref 20–32)
COLOR UR AUTO: YELLOW
CREAT SERPL-MCNC: 1.06 MG/DL (ref 0.66–1.25)
DIFFERENTIAL METHOD BLD: ABNORMAL
EOSINOPHIL # BLD AUTO: 0.1 10E9/L (ref 0–0.7)
EOSINOPHIL NFR BLD AUTO: 0.4 %
ERYTHROCYTE [DISTWIDTH] IN BLOOD BY AUTOMATED COUNT: 19.9 % (ref 10–15)
GFR SERPL CREATININE-BSD FRML MDRD: 76 ML/MIN/{1.73_M2}
GLUCOSE SERPL-MCNC: 108 MG/DL (ref 70–99)
GLUCOSE UR STRIP-MCNC: NEGATIVE MG/DL
HCT VFR BLD AUTO: 40.5 % (ref 40–53)
HEMOCCULT STL QL: POSITIVE
HGB BLD-MCNC: 12.7 G/DL (ref 13.3–17.7)
HGB UR QL STRIP: NEGATIVE
IMM GRANULOCYTES # BLD: 0.1 10E9/L (ref 0–0.4)
IMM GRANULOCYTES NFR BLD: 0.5 %
INR PPP: 1.3 (ref 0.86–1.14)
KETONES UR STRIP-MCNC: NEGATIVE MG/DL
LEUKOCYTE ESTERASE UR QL STRIP: NEGATIVE
LYMPHOCYTES # BLD AUTO: 0.8 10E9/L (ref 0.8–5.3)
LYMPHOCYTES NFR BLD AUTO: 5 %
MCH RBC QN AUTO: 28.3 PG (ref 26.5–33)
MCHC RBC AUTO-ENTMCNC: 31.4 G/DL (ref 31.5–36.5)
MCV RBC AUTO: 90 FL (ref 78–100)
MONOCYTES # BLD AUTO: 1.3 10E9/L (ref 0–1.3)
MONOCYTES NFR BLD AUTO: 8.3 %
NEUTROPHILS # BLD AUTO: 13.3 10E9/L (ref 1.6–8.3)
NEUTROPHILS NFR BLD AUTO: 85.5 %
NITRATE UR QL: NEGATIVE
NRBC # BLD AUTO: 0 10*3/UL
NRBC BLD AUTO-RTO: 0 /100
PH UR STRIP: 6 PH (ref 5–7)
PLATELET # BLD AUTO: 174 10E9/L (ref 150–450)
POTASSIUM SERPL-SCNC: 3.8 MMOL/L (ref 3.4–5.3)
PROT SERPL-MCNC: 7 G/DL (ref 6.8–8.8)
RBC # BLD AUTO: 4.48 10E12/L (ref 4.4–5.9)
SODIUM SERPL-SCNC: 135 MMOL/L (ref 133–144)
SOURCE: NORMAL
SP GR UR STRIP: 1.02 (ref 1–1.03)
SPECIMEN SOURCE: ABNORMAL
UROBILINOGEN UR STRIP-MCNC: 2 MG/DL (ref 0–2)
WBC # BLD AUTO: 15.6 10E9/L (ref 4–11)

## 2021-06-17 PROCEDURE — 258N000003 HC RX IP 258 OP 636: Performed by: PHYSICIAN ASSISTANT

## 2021-06-17 PROCEDURE — 85025 COMPLETE CBC W/AUTO DIFF WBC: CPT | Performed by: EMERGENCY MEDICINE

## 2021-06-17 PROCEDURE — 81003 URINALYSIS AUTO W/O SCOPE: CPT | Performed by: PHYSICIAN ASSISTANT

## 2021-06-17 PROCEDURE — 99283 EMERGENCY DEPT VISIT LOW MDM: CPT | Mod: 25

## 2021-06-17 PROCEDURE — 96361 HYDRATE IV INFUSION ADD-ON: CPT

## 2021-06-17 PROCEDURE — 85610 PROTHROMBIN TIME: CPT | Performed by: EMERGENCY MEDICINE

## 2021-06-17 PROCEDURE — 82272 OCCULT BLD FECES 1-3 TESTS: CPT | Performed by: PHYSICIAN ASSISTANT

## 2021-06-17 PROCEDURE — 80053 COMPREHEN METABOLIC PANEL: CPT | Performed by: EMERGENCY MEDICINE

## 2021-06-17 PROCEDURE — 87493 C DIFF AMPLIFIED PROBE: CPT | Performed by: PHYSICIAN ASSISTANT

## 2021-06-17 PROCEDURE — 87506 IADNA-DNA/RNA PROBE TQ 6-11: CPT | Performed by: PHYSICIAN ASSISTANT

## 2021-06-17 PROCEDURE — 96360 HYDRATION IV INFUSION INIT: CPT

## 2021-06-17 PROCEDURE — 85610 PROTHROMBIN TIME: CPT | Performed by: PHYSICIAN ASSISTANT

## 2021-06-17 RX ADMIN — SODIUM CHLORIDE 1000 ML: 9 INJECTION, SOLUTION INTRAVENOUS at 14:00

## 2021-06-17 ASSESSMENT — ENCOUNTER SYMPTOMS
ANAL BLEEDING: 1
ABDOMINAL PAIN: 0
CHILLS: 0
BLOOD IN STOOL: 0
SLEEP DISTURBANCE: 1
WEAKNESS: 1
FEVER: 0
APPETITE CHANGE: 1
CHEST TIGHTNESS: 0
RECTAL PAIN: 1
SHORTNESS OF BREATH: 0
ABDOMINAL DISTENTION: 1
DIARRHEA: 1

## 2021-06-17 NOTE — ED PROVIDER NOTES
History     Chief Complaint:  Diarrhea      HPI   Yakov Kahn is a 60 year old male with a history of upper GI bleed and anemia taking iron supplements who presents with 10-12 episodes of liquid diarrhea per day for the past week that began when he took Exlax. His wife noted he was in the ED in May for alcoholic cirrhosis. She expressed concern that he has not been tested for C diff as he was admitted and given antibiotics the week of May 15th. He does have follow up for this admission tomorrow. Today, his wife states that he was improving after discharge but soon after he has not been sleeping well over the last few days, trouble eating, and weakness. He complained of abdominal distention and slight, intermittent pain in rectum. He noted he has experienced bleeding from hemorrhoids as well.  He denied urinary symptoms. Denied chest pain, shortness of breath, or abdominal pain. Denied fever, chills, blood in stool. No history of heart problems or MI.  Of note, he had an MRI on his abdomen two days ago, see below. Admits to a history of former alcohol abuse.     MRI results from 6/15/21:  MR Abdomen w/ and w/o contrast:   1. Cirrhotic liver morphology with evidence of portal hypertension  including splenomegaly, and large portal systemic collaterals and  small volume ascites. No suspicious focal hepatic mass is visualized.  2. Mild colonic wall thickening predominantly of the ascending and  transverse colon, likely represents portal colopathy.  As per radiology.     Review of Systems   Constitutional: Positive for appetite change. Negative for chills and fever.   Respiratory: Negative for chest tightness and shortness of breath.    Gastrointestinal: Positive for abdominal distention, anal bleeding, diarrhea and rectal pain. Negative for abdominal pain and blood in stool.   Neurological: Positive for weakness.   Psychiatric/Behavioral: Positive for sleep disturbance.   All other systems reviewed and are  negative.    Allergies:  The patient has no known allergies.    Medications:    Xyzal  Protonix  Inderal    Past Medical History:    Alcohol abuse  Hypertension  Cirrhosis of liver  Esophageal varices  Upper GI bleed  Anemia  Plantar fascial fibromatosis     Past Surgical History:    EGD    Family History:    Brother: hypertension    Social History:  Presents to ED with wife.  .    Physical Exam     Patient Vitals for the past 24 hrs:   BP Temp Temp src Pulse Resp SpO2   06/17/21 1549 -- -- -- -- 16 --   06/17/21 1443 -- -- -- -- -- 97 %   06/17/21 1430 126/73 -- -- 77 -- 97 %   06/17/21 1400 127/78 -- -- 78 -- --   06/17/21 1141 138/85 97.8  F (36.6  C) Temporal 81 18 99 %     Physical Exam  General: Alert and interactive. Appears well. Cooperative and pleasant.   Eyes: The pupils are equal and round. EOMs intact. No scleral icterus.  ENT: No abnormalities to the external nose or ears. Mucous membranes moist. Posterior oropharynx is non-erythematous.    Neck: Trachea is in the midline. No nuchal rigidity.     CV: Regular rate and rhythm. S1 and S2 normal without murmur, click, gallop or rub.   Resp: Breath sounds are clear bilaterally, without rhonchi, wheezes, rales. Non-labored, no retractions or accessory muscle use.    GI: Abdomen is soft with minimal distension. No tenderness to palpation.Ventral hernia.   Rectal: Dark stools and external hemorrhoids. No active bleeding. Minimal tenderness to palpation. Good rectal tone.   MS: Moving all extremities well. Good muscle tone.   Skin: Warm and dry. No rash or lesions noted.  Neuro: Alert and oriented x 3. No focal neurologic deficits. Good strength and sensation in upper and lower extremities.   Psych: Awake. Alert.  Normal affect. Appropriate interactions.  Lymph: No anterior or posterior cervical lymphadenopathy noted.    Emergency Department Course     Laboratory:  CBC: WBC 15.6 (H), HGB 12.7 (L),      CMP: glucose: 108 (H), bilirubin: 3.3 (H),  albumin: 2.7 (L) o/w WNL (Creatinine 1.06)     INR: 1.30 (H)    Occult blood stool: positive (A)    UA with microscopic: negative o/w WNL     Enteric Bacteria and Virus Panel: Pending    Clostridium difficile toxin: Pending    Emergency Department Course:    Reviewed:  I reviewed nursing notes, vitals, past history and care everywhere    Assessments:  1344 I obtained history and examined the patient as noted above.     1424 I rechecked the patient and explained findings.     1535 Updated and rechecked the patient.     Interventions:  1400 NS 1 L IV    Disposition:  The patient was discharged to home.    Impression & Plan      Medical Decision Making:  Yakov Kahn is a 60 year old male with a history of upper GI bleed, anemia, alcoholic cirrhosis who presents for evaluation of diarrhea for the past week, beginning after he took Ex-Lax. The patient was admitted in May for severe anemia due to upper GI bleeding from alcoholic cirrhosis. He is followed with GI and has abstained from alcohol ever since this hospitalization. He takes iron supplementation and his hemoglobin has improved over time. An MRI of his abdomen performed 2 days ago showed cirrhotic liver morphology with evidence of portal hypertension and splenomegaly without significant ascites. He also has colonic wall thickening, likely representing portal colopathy but no other infectious signs. This does not seem changed significantly since his CT at the end of May. No signs of colitis, abscess, ABO. No significant ascites, abdominal pain or fevers to suggest SBP. He presents out of concern for abdominal swelling and persistent diarrhea. He denies any fever, chills, abdominal pain, urinary symptoms. I did perform a rectal examination that was positive for blood, but whether or not this is from an upper GI source versus external hemorrhoids is hard to differentiate. He has 2 external hemorrhoids that are not actively bleeding but he has had blood over the  past few days when wiping. I suspect his dark stools are secondary to iron supplementation, as he had a normal dueodenum and stomach on EGD one month ago, despite the fact that he has esophageal varices. He takes Protonix and Propranolol for GERD and portal hypertension. Urine shows no markers for infection. No significant renal insufficiency or electrolyte abnormalities despite the copious amounts of diarrhea. He was given a liter of fluid for hydration.  Leukocytosis is nonspecific, given no obvious signs of infection. Possibly secondary to diarrhea vs stress demargination. His hemoglobin is almost normal at 12.7.     Patient was able to provide stool cultures here, and these will need to be watched. Doubtful of C. Diff, as the patient just started having this diarrhea (not right after hospitalization) and has no recent ABX or exposures. I wonder if his abdominal cramping is actually coming form excess iron supplementation. I suggested he hold his iron supplements for the next day and see if this helps. He may need IV iron if unable to further tolerate oral iron. Will continue to trend hemoglobin. Family is comfortable discharging home, as he has follow up with PCP tomorrow. He is vitally stable without hematochezia or persistent melena and hemoglobin is stable. Return for fevers, persistent abdominal pain, unable to tolerate symptoms at home.        Diagnosis:    ICD-10-CM    1. Diarrhea, unspecified type  R19.7    2. Alcoholic liver disease (H)  K70.9        Scribe Disclosure:  IVy, am serving as a scribe at 1:44 PM on 6/17/2021 to document services personally performed by Maki Robins PA based on my observations and the provider's statements to me.     I, Neri Mcdonald, am serving as a scribe  at 5:11 PM on 6/17/2021 to document services personally performed by Maki Robins PA based on my observations and the provider's statements to me.        Maki Robins  CARLA  06/17/21 [id number]

## 2021-06-17 NOTE — ED TRIAGE NOTES
Diarrhea for 7-10 days. Previous diagnoses of anemia. Denies blood in the stool. Abdominal bloating. Unable to sleep. Recent MRI results

## 2021-06-17 NOTE — DISCHARGE INSTRUCTIONS
Hold your Iron supplement tonight and tomorrow morning.   See primary care for follow up.   Continue eating a bland diet and pushing fluids.   Stool cultures are pending - you will be called in 48 hours if they come back positive.       Discharge Instructions  Adult Diarrhea    You have been seen today for diarrhea (loose stools). This is usually caused by a virus, but some bacteria, parasites, medicines, or other medical conditions can cause similar symptoms. At this time your provider does not find that your diarrhea is a sign of anything dangerous or life-threatening. However, sometimes the signs of serious illness do not show up right away. If you have new or worse symptoms, you may need to be seen again in the Emergency Department or by your primary provider.     Generally, every Emergency Department visit should have a follow-up clinic visit with either a primary or a specialty clinic/provider. Please follow-up as instructed by your emergency provider today.    Return to the Emergency Department if:  You feel you are getting dehydrated, such as being very thirsty, not urinating (peeing) like usual, or feeling faint or lightheaded.   You develop a new fever.  You have abdominal (belly) pain that seems worse than cramps, is in one spot, or is getting worse over time.   You have blood in your stool or your stool becomes black.  (Remember that if you take Pepto-Bismol , this will turn your stool black).   You feel very weak.    What can I do to help myself?  The most important thing to do is to drink clear liquids.   It is best to have only small, frequent sips of liquids. Drinking too much at once may cause more diarrhea. You should also replace minerals, sodium and potassium lost with diarrhea. Pedialyte  and sports drinks can help you replace these minerals. You can also drink clear liquids such as water, weak tea, apple juice, and 7-Up . Avoid acidic liquids (orange juice), caffeine (coffee) or alcohol. Milk  "products will make the diarrhea worse.  Eat bland (plain) foods. Soda crackers, toast, plain noodles, gelatin, applesauce and bananas are good first choices. Avoid foods that have acid, are spicy, fatty or fibrous (such as meats, coarse grains, vegetables). You may start eating these foods again in about 3 days when you are better.   Sometimes treatment includes prescription medicine to prevent diarrhea. If your provider prescribes these for you, take them as directed.   Nonprescription medicine is available for the treatment of diarrhea and can be very effective. If you use it, make sure you use the dose recommended on the package. Check with your healthcare provider before you use any medicine for diarrhea.   Do not take ibuprofen, or other nonsteroidal anti-inflammatory medicines, without checking with your healthcare provider.   Probiotics: If you have been given an antibiotic, you may want to also take a probiotic pill or eat yogurt with live cultures. Probiotics have \"good bacteria\" to help your intestines stay healthy. Studies have shown that probiotics help prevent diarrhea and other intestine problems (including C. diff infection) when you take antibiotics. You can buy these without a prescription in the pharmacy section of the store.   If you were given a prescription for medicine here today, be sure to read all of the information (including the package insert) that comes with your prescription.  This will include important information about the medicine, its side effects, and any warnings that you need to know about.  The pharmacist who fills the prescription can provide more information and answer questions you may have about the medicine.  If you have questions or concerns that the pharmacist cannot address, please call or return to the Emergency Department.  Remember that you can always come back to the Emergency Department if you are not able to see your regular provider in the amount of time listed " above, if you get any new symptoms, or if there is anything that worries you.

## 2021-06-18 ENCOUNTER — OFFICE VISIT (OUTPATIENT)
Dept: FAMILY MEDICINE | Facility: CLINIC | Age: 61
End: 2021-06-18
Payer: COMMERCIAL

## 2021-06-18 ENCOUNTER — TELEPHONE (OUTPATIENT)
Dept: EMERGENCY MEDICINE | Facility: CLINIC | Age: 61
End: 2021-06-18

## 2021-06-18 ENCOUNTER — MYC MEDICAL ADVICE (OUTPATIENT)
Dept: FAMILY MEDICINE | Facility: CLINIC | Age: 61
End: 2021-06-18

## 2021-06-18 VITALS
BODY MASS INDEX: 30.31 KG/M2 | RESPIRATION RATE: 16 BRPM | WEIGHT: 200 LBS | TEMPERATURE: 98.2 F | HEART RATE: 76 BPM | DIASTOLIC BLOOD PRESSURE: 64 MMHG | SYSTOLIC BLOOD PRESSURE: 112 MMHG | OXYGEN SATURATION: 97 % | HEIGHT: 68 IN

## 2021-06-18 DIAGNOSIS — K70.30 ALCOHOLIC CIRRHOSIS, UNSPECIFIED WHETHER ASCITES PRESENT (H): ICD-10-CM

## 2021-06-18 DIAGNOSIS — D62 ANEMIA DUE TO BLOOD LOSS, ACUTE: ICD-10-CM

## 2021-06-18 DIAGNOSIS — A04.72 CLOSTRIDIUM DIFFICILE DIARRHEA: Primary | ICD-10-CM

## 2021-06-18 PROCEDURE — 99214 OFFICE O/P EST MOD 30 MIN: CPT | Performed by: PHYSICIAN ASSISTANT

## 2021-06-18 RX ORDER — VANCOMYCIN HYDROCHLORIDE 125 MG/1
125 CAPSULE ORAL 4 TIMES DAILY
Qty: 40 CAPSULE | Refills: 0 | Status: CANCELLED | OUTPATIENT
Start: 2021-06-18 | End: 2021-06-28

## 2021-06-18 RX ORDER — VANCOMYCIN HYDROCHLORIDE 125 MG/1
125 CAPSULE ORAL 4 TIMES DAILY
Qty: 40 CAPSULE | Refills: 0 | Status: SHIPPED | OUTPATIENT
Start: 2021-06-18 | End: 2021-08-03

## 2021-06-18 RX ORDER — PROPRANOLOL HYDROCHLORIDE 10 MG/1
10 TABLET ORAL 2 TIMES DAILY
Qty: 180 TABLET | Refills: 1 | Status: SHIPPED | OUTPATIENT
Start: 2021-06-18 | End: 2022-09-15

## 2021-06-18 RX ORDER — PANTOPRAZOLE SODIUM 40 MG/1
40 TABLET, DELAYED RELEASE ORAL
Qty: 90 TABLET | Refills: 0 | Status: SHIPPED | OUTPATIENT
Start: 2021-06-18 | End: 2021-10-26

## 2021-06-18 ASSESSMENT — MIFFLIN-ST. JEOR: SCORE: 1691.69

## 2021-06-18 NOTE — TELEPHONE ENCOUNTER
[facility] Emergency Department Lab result notification [Adult-Male]    [place] ED lab result protocol used  Clostridium difficile    Reason for call  Notify of lab results, assess symptoms,  review ED providers recommendations/discharge instructions (if necessary) and advise per ED lab result f/u protocol    Lab Result (including Rx patient on, if applicable)  Final Clostridium difficile toxin B PCR is POSITIVE.    [facility] Emergency Dept discharge antibiotic: None  Recommendations in treatment per [facility] ED Lab result Clostridium difficile protocol.    Information table from Emergency Dept Provider visit on 6/17/21  Symptoms reported at ED visit (Chief complaint, HPI) Diarrhea        HPI   Yakov Kahn is a 60 year old male with a history of upper GI bleed and anemia taking iron supplements who presents with 10-12 episodes of liquid diarrhea per day for the past week that began when he took Exlax. His wife noted he was in the ED in May for alcoholic cirrhosis. She expressed concern that he has not been tested for C diff as he was admitted and given antibiotics the week of May 15th. He does have follow up for this admission tomorrow. Today, his wife states that he was improving after discharge but soon after he has not been sleeping well over the last few days, trouble eating, and weakness. He complained of abdominal distention and slight, intermittent pain in rectum. He noted he has experienced bleeding from hemorrhoids as well.  He denied urinary symptoms. Denied chest pain, shortness of breath, or abdominal pain. Denied fever, chills, blood in stool. No history of heart problems or MI.  Of note, he had an MRI on his abdomen two days ago, see below. Admits to a history of former alcohol abuse.       Significant Medical hx, if applicable (i.e. CKD, diabetes) Reviewed   Allergies No Known Allergies   Weight, if applicable Wt Readings from Last 2 Encounters:   06/05/21 90.7 kg  (200 lb)   05/28/21 90.8 kg (200 lb 1.6 oz)      Coumadin/Warfarin [Yes /No] No   Creatinine Level (mg/dl) Creatinine   Date Value Ref Range Status   06/17/2021 1.06 0.66 - 1.25 mg/dL Final      Creatinine clearance (ml/min), if applicable Serum creatinine: 1.06 mg/dL 06/17/21 1214  Estimated creatinine clearance: 81 mL/min   ED providers Impression and Plan (applicable information) Yakov Kahn is a 60 year old male with a history of upper GI bleed, anemia, alcoholic cirrhosis who presents for evaluation of diarrhea for the past week, beginning after he took Ex-Lax. The patient was admitted in May for severe anemia due to upper GI bleeding from alcoholic cirrhosis. He is followed with GI and has abstained from alcohol ever since this hospitalization. He takes iron supplementation and his hemoglobin has improved over time. An MRI of his abdomen performed 2 days ago showed cirrhotic liver morphology with evidence of portal hypertension and splenomegaly without significant ascites. He also has colonic wall thickening, likely representing portal colopathy but no other infectious signs. This does not seem changed significantly since his CT at the end of May. No signs of colitis, abscess, ABO. No significant ascites, abdominal pain or fevers to suggest SBP. He presents out of concern for abdominal swelling and persistent diarrhea. He denies any fever, chills, abdominal pain, urinary symptoms. I did perform a rectal examination that was positive for blood, but whether or not this is from an upper GI source versus external hemorrhoids is hard to differentiate. He has 2 external hemorrhoids that are not actively bleeding but he has had blood over the past few days when wiping. I suspect his dark stools are secondary to iron supplementation, as he had a normal dueodenum and stomach on EGD one month ago, despite the fact that he has esophageal varices. He takes Protonix and Propranolol for GERD and portal hypertension.  Urine shows no markers for infection. No significant renal insufficiency or electrolyte abnormalities despite the copious amounts of diarrhea. He was given a liter of fluid for hydration.  Leukocytosis is nonspecific, given no obvious signs of infection. Possibly secondary to diarrhea vs stress demargination. His hemoglobin is almost normal at 12.7.      Patient was able to provide stool cultures here, and these will need to be watched. Doubtful of C. Diff, as the patient just started having this diarrhea (not right after hospitalization) and has no recent ABX or exposures. I wonder if his abdominal cramping is actually coming form excess iron supplementation. I suggested he hold his iron supplements for the next day and see if this helps. He may need IV iron if unable to further tolerate oral iron. Will continue to trend hemoglobin. Family is comfortable discharging home, as he has follow up with PCP tomorrow. He is vitally stable without hematochezia or persistent melena and hemoglobin is stable. Return for fevers, persistent abdominal pain, unable to tolerate symptoms at home.        ED diagnosis  Diarrhea, unspecified type     Alcoholic liver disease (H)     ED provider Maki Robins PA-C      RN Assessment (Patient s current Symptoms), include time called.  [Insert Left message here if message left]  9:18 Left voicemail message requesting a call back to [facility] ED Lab Result RN at [phone].  RN is available every day between 9 a.m. and 5:30 p.m.          Jenni Meza  [facility] [facility]  Emergency Dept Lab Result RN  Ph# [phone]     Copy of Lab result  Contains abnormal data Clostridium difficile toxin B PCR  Order: [id number]  Status:  Final result   Visible to patient:  Yes (MyChart) Dx:  Diarrhea, unspecified type  Specimen Information: Feces         Ref Range & Units 1d ago Resulting Agency   Specimen Description  Feces  [facility]  Hospital Lab   C Diff Toxin B PCR NEG^Negative PositiveAbnormal   [facility]   Comment: Positive: Toxin producing C. difficile target DNA sequences detected, presumed    positive for C. difficile toxin B. C. difficile (Requires Enteric Isolation).       C. difficile (Requires Enteric Isolation)   FDA approved assay performed using [employer] GeneXpert real-time PCR.          Specimen Collected: 06/17/21  3:07 PM Last Resulted: 06/17/21  8:06 PM

## 2021-06-18 NOTE — RESULT ENCOUNTER NOTE
Final Enteric Bacteria and Virus Panel by [facility] Stool is NEGATIVE for Campylobacter group, Salmonella species, Shigella species, Shiga toxin 1 gene, Shiga toxin 2 gene, Vibrio group,  Yersinia enterocolitica,  Rotavirus A,  and Norovirus I and II.  No treatment or change in treatment per [facility] Lab Result Enteric Bacteria and Virus Panel protocol.

## 2021-06-18 NOTE — RESULT ENCOUNTER NOTE
Final Clostridium difficile toxin B PCR is POSITIVE.    [facility] Emergency Dept discharge antibiotic: None  Recommendations in treatment per [facility] ED Lab result Clostridium difficile protocol.

## 2021-06-18 NOTE — PROGRESS NOTES
"    Assessment & Plan     Clostridium difficile diarrhea  Discussed results today. Certainly describes why he's been feeling so terrible. Start below and monitor for worsening. Reviewed hydration, nutrition etc.   - vancomycin (VANCOCIN) 125 MG capsule; Take 1 capsule (125 mg) by mouth 4 times daily    Alcoholic cirrhosis, unspecified whether ascites present (H)  He has not felt much better since his hospitalization but obviously with the C Diff he wouldn't. He has remained sober. MRI reviewed. He does have follow up later this summer with GI         BMI:   Estimated body mass index is 30.41 kg/m  as calculated from the following:    Height as of this encounter: 1.727 m (5' 8\").    Weight as of this encounter: 90.7 kg (200 lb).       Return in about 6 weeks (around 7/30/2021) for Recheck; sooner if not improving .    CARLA Arango [facility] ROSEMARIE Alvarado is a 60 year old who presents for the following health issues  accompanied by his spouse:    HPI     Diarrhea  Onset/Duration: 2 weeks  Description:       Consistency of stool: watery and loose       Blood in stool: no       Number of loose stools past 24 hours: 15-20  Progression of Symptoms: same  Accompanying signs and symptoms:       Fever: no       Nausea/Vomiting: no       Abdominal pain: no       Weight loss: no       Episodes of constipation: no  History   Ill contacts: no  Recent use of antibiotics: no  Recent travels: no  Recent medication-new or changes(Rx or OTC): no  Precipitating or alleviating factors: None  Therapies tried and outcome: None      Yakov Kahn is a 60 year old male who presents today for ED follow up from yesterdday to discuss ongoing chronic diarrhea  Diarrhea began a bit his hospitalization for anemia and cirrhosis  There has been no vomiting  Denies fevers  Lethargic  Constant explosive, malodorous diarrhea  Labs yesterday in clinic were normal    Review of Systems " "  CONSTITUTIONAL:POSITIVE  for chills, fatigue  ENT/MOUTH: NEGATIVE for ear, mouth and throat problems  RESP: NEGATIVE for significant cough or SOB  CV: NEGATIVE for chest pain, palpitations or peripheral edema  GI: POSITIVE for diarrhea  MUSCULOSKELETAL: POSITIVE  for myalgia  PSYCHIATRIC: NEGATIVE for changes in mood or affect      Objective    /64 (BP Location: Right arm, Patient Position: Chair, Cuff Size: Adult Regular)   Pulse 76   Temp 98.2  F (36.8  C) (Oral)   Resp 16   Ht 1.727 m (5' 8\")   Wt 90.7 kg (200 lb)   SpO2 97%   BMI 30.41 kg/m    Body mass index is 30.41 kg/m .  Physical Exam   GENERAL: healthy, alert and no distress  RESP: lungs clear to auscultation - no rales, rhonchi or wheezes  CV: regular rates and rhythm  ABDOMEN: tenderness umbilical; bowel sounds hyperactive; there is diastasis recti changes  RECTAL (male): multiple external hemorrhoids  MS: No peripheral edema     C DIFF POSITIVE          "

## 2021-06-28 ENCOUNTER — TRANSFERRED RECORDS (OUTPATIENT)
Dept: HEALTH INFORMATION MANAGEMENT | Facility: CLINIC | Age: 61
End: 2021-06-28

## 2021-06-28 LAB
ALT SERPL-CCNC: 67 U/L (ref 0–78)
AST SERPL-CCNC: 96 U/L (ref 0–37)
CREATININE (EXTERNAL): 0.99 MG/DL (ref 0.7–1.3)
INR (EXTERNAL): 1.1 (ref 0.9–1.1)

## 2021-08-02 NOTE — TELEPHONE ENCOUNTER
Panel Management Review      Patient has the following on his problem list:     Hypertension   Last three blood pressure readings:  BP Readings from Last 3 Encounters:   11/15/17 146/86   11/07/17 148/90   08/03/17 148/84     Blood pressure:     HTN Guidelines:  Age 18-59 BP range:  Less than 140/90  Age 60-85 with Diabetes:  Less than 140/90  Age 60-85 without Diabetes:  less than 150/90        Composite cancer screening  Chart review shows that this patient is due/due soon for the following None  Summary:    Patient is due/failing the following:   BP CHECK    Action needed:   Patient needs office visit for BP check.    Type of outreach:    Phone, spoke to patient.  Patient stated that he would get BP checked when he is in clinic next Friday.     Questions for provider review:    None                                                                                                                                    Preethi Morgan MA                 Call [facility] scheduling if any testing was order at the time of your visit at [phone] to schedule your test. Tell them your orders are in epic.   If you don't hear from us in 7 days after completing your test call our office [phone]

## 2021-08-03 ENCOUNTER — OFFICE VISIT (OUTPATIENT)
Dept: FAMILY MEDICINE | Facility: CLINIC | Age: 61
End: 2021-08-03
Payer: COMMERCIAL

## 2021-08-03 VITALS
TEMPERATURE: 97.9 F | HEIGHT: 68 IN | DIASTOLIC BLOOD PRESSURE: 60 MMHG | HEART RATE: 64 BPM | WEIGHT: 195.7 LBS | RESPIRATION RATE: 18 BRPM | SYSTOLIC BLOOD PRESSURE: 100 MMHG | BODY MASS INDEX: 29.66 KG/M2 | OXYGEN SATURATION: 98 %

## 2021-08-03 DIAGNOSIS — A04.72 CLOSTRIDIUM DIFFICILE DIARRHEA: ICD-10-CM

## 2021-08-03 DIAGNOSIS — K70.30 ALCOHOLIC CIRRHOSIS, UNSPECIFIED WHETHER ASCITES PRESENT (H): Primary | ICD-10-CM

## 2021-08-03 DIAGNOSIS — I10 ESSENTIAL HYPERTENSION: ICD-10-CM

## 2021-08-03 PROCEDURE — 99213 OFFICE O/P EST LOW 20 MIN: CPT | Performed by: PHYSICIAN ASSISTANT

## 2021-08-03 ASSESSMENT — MIFFLIN-ST. JEOR: SCORE: 1672.19

## 2021-08-03 NOTE — PROGRESS NOTES
"    Assessment & Plan     Alcoholic cirrhosis, unspecified whether ascites present (H)  Stable. Continue follow up with GI. No labs needed today. Reviewed recommended follow up annual with labs in about 6 months.     Essential hypertension  Controlled, even trending low. Asymptomatic. Consider dose reduction of BB if trending lower or symptoms     Clostridium difficile diarrhea  Improved.          BMI:   Estimated body mass index is 29.76 kg/m  as calculated from the following:    Height as of this encounter: 1.727 m (5' 8\").    Weight as of this encounter: 88.8 kg (195 lb 11.2 oz).         Return in about 6 months (around 2/3/2022) for Physical Exam, Lab Work.    Humberto Vora PA-C  [facility] [place]    Subjective   Christiano is a 60 year old who presents for the following health issues  accompanied by his spouse:    HPI     Concerns: Recheck from hospital visits.   Christiano mentions he is sleeping a lot better; getting at least 7  BMs are completely normal after tx for C Diff  No vomiting or belly pain  Energy level is near back to normal   -Started back to work after improving his C diff symptoms   -Bikes with dog   Limits ETOH  Healthy eating   No other concerns    Review of Systems   Constitutional, HEENT, cardiovascular, pulmonary, gi and gu systems are negative, except as otherwise noted.      Objective    /60 (BP Location: Right arm, Patient Position: Chair, Cuff Size: Adult Large)   Pulse 64   Temp 97.9  F (36.6  C) (Tympanic)   Resp 18   Ht 1.727 m (5' 8\")   Wt 88.8 kg (195 lb 11.2 oz)   SpO2 98%   BMI 29.76 kg/m    Body mass index is 29.76 kg/m .  Physical Exam   GENERAL: healthy, alert and no distress  EYES: Eyes grossly normal to inspection, PERRL and conjunctivae and sclerae normal  RESP: lungs clear to auscultation - no rales, rhonchi or wheezes  CV: regular rate and rhythm, normal S1 S2, no S3 or S4, no murmur, click or rub, no peripheral edema and peripheral pulses " strong  ABDOMEN: soft, nontender, no hepatosplenomegaly, no masses and bowel sounds normal  MS: No peripheral edema   SKIN: superficial varicose veins bilateral LE  PSYCH: mentation appears normal, affect normal/bright    Reviewed recent labs

## 2021-09-01 ENCOUNTER — TRANSFERRED RECORDS (OUTPATIENT)
Dept: HEALTH INFORMATION MANAGEMENT | Facility: CLINIC | Age: 61
End: 2021-09-01

## 2021-09-19 ENCOUNTER — HEALTH MAINTENANCE LETTER (OUTPATIENT)
Age: 61
End: 2021-09-19

## 2021-10-15 ENCOUNTER — HOSPITAL ENCOUNTER (OUTPATIENT)
Dept: ULTRASOUND IMAGING | Facility: CLINIC | Age: 61
Discharge: HOME | End: 2021-10-15
Attending: INTERNAL MEDICINE | Admitting: INTERNAL MEDICINE
Payer: COMMERCIAL

## 2021-10-15 DIAGNOSIS — K70.30 ALCOHOLIC CIRRHOSIS OF LIVER WITHOUT ASCITES (H): ICD-10-CM

## 2021-10-15 PROCEDURE — 76705 ECHO EXAM OF ABDOMEN: CPT

## 2021-10-26 ENCOUNTER — MYC MEDICAL ADVICE (OUTPATIENT)
Dept: FAMILY MEDICINE | Facility: CLINIC | Age: 61
End: 2021-10-26

## 2021-10-26 DIAGNOSIS — D62 ANEMIA DUE TO BLOOD LOSS, ACUTE: ICD-10-CM

## 2021-10-26 RX ORDER — PANTOPRAZOLE SODIUM 40 MG/1
40 TABLET, DELAYED RELEASE ORAL
Qty: 30 TABLET | Refills: 0 | Status: SHIPPED | OUTPATIENT
Start: 2021-10-26 | End: 2022-09-15

## 2021-12-03 ENCOUNTER — TRANSFERRED RECORDS (OUTPATIENT)
Dept: HEALTH INFORMATION MANAGEMENT | Facility: CLINIC | Age: 61
End: 2021-12-03
Payer: COMMERCIAL

## 2021-12-03 LAB
ALT SERPL-CCNC: 49 IU/L (ref 0–44)
AST SERPL-CCNC: 68 IU/L (ref 0–40)
CREATININE (EXTERNAL): 0.89 MG/DL (ref 0.76–1.27)
GFR ESTIMATED (EXTERNAL): 92 ML/MIN/1.73M2
GFR ESTIMATED (IF AFRICAN AMERICAN) (EXTERNAL): 107 ML/MIN/1.73M2
INR (EXTERNAL): 1.1 (ref 0.9–1.2)

## 2022-04-25 ENCOUNTER — HOSPITAL ENCOUNTER (OUTPATIENT)
Dept: MRI IMAGING | Facility: CLINIC | Age: 62
Discharge: HOME | End: 2022-04-25
Attending: INTERNAL MEDICINE | Admitting: INTERNAL MEDICINE
Payer: COMMERCIAL

## 2022-04-25 DIAGNOSIS — K76.9 LIVER LESION: ICD-10-CM

## 2022-04-25 PROCEDURE — A9585 GADOBUTROL INJECTION: HCPCS | Performed by: INTERNAL MEDICINE

## 2022-04-25 PROCEDURE — 74183 MRI ABD W/O CNTR FLWD CNTR: CPT

## 2022-04-25 PROCEDURE — 255N000002 HC RX 255 OP 636: Performed by: INTERNAL MEDICINE

## 2022-04-25 RX ORDER — GADOBUTROL 604.72 MG/ML
10 INJECTION INTRAVENOUS ONCE
Status: COMPLETED | OUTPATIENT
Start: 2022-04-25 | End: 2022-04-25

## 2022-04-25 RX ADMIN — GADOBUTROL 9 ML: 604.72 INJECTION INTRAVENOUS at 08:24

## 2022-05-01 ENCOUNTER — HEALTH MAINTENANCE LETTER (OUTPATIENT)
Age: 62
End: 2022-05-01

## 2022-05-17 ENCOUNTER — TRANSFERRED RECORDS (OUTPATIENT)
Dept: HEALTH INFORMATION MANAGEMENT | Facility: CLINIC | Age: 62
End: 2022-05-17
Payer: COMMERCIAL

## 2022-09-15 ENCOUNTER — HOSPITAL ENCOUNTER (INPATIENT)
Facility: CLINIC | Age: 62
LOS: 4 days | Discharge: HOME | DRG: 432 | End: 2022-09-19
Attending: EMERGENCY MEDICINE | Admitting: INTERNAL MEDICINE
Payer: COMMERCIAL

## 2022-09-15 ENCOUNTER — MYC MEDICAL ADVICE (OUTPATIENT)
Dept: FAMILY MEDICINE | Facility: CLINIC | Age: 62
End: 2022-09-15

## 2022-09-15 ENCOUNTER — VIRTUAL VISIT (OUTPATIENT)
Dept: FAMILY MEDICINE | Facility: CLINIC | Age: 62
End: 2022-09-15
Payer: COMMERCIAL

## 2022-09-15 DIAGNOSIS — K92.2 UPPER GI BLEED: ICD-10-CM

## 2022-09-15 DIAGNOSIS — K92.1 BLACK STOOL: Primary | ICD-10-CM

## 2022-09-15 DIAGNOSIS — D64.9 ANEMIA, UNSPECIFIED TYPE: ICD-10-CM

## 2022-09-15 DIAGNOSIS — K70.30 ALCOHOLIC CIRRHOSIS, UNSPECIFIED WHETHER ASCITES PRESENT (H): ICD-10-CM

## 2022-09-15 DIAGNOSIS — K92.1 MELENA: ICD-10-CM

## 2022-09-15 DIAGNOSIS — K27.9 PUD (PEPTIC ULCER DISEASE): ICD-10-CM

## 2022-09-15 DIAGNOSIS — M72.2 PLANTAR FASCIAL FIBROMATOSIS: Primary | ICD-10-CM

## 2022-09-15 DIAGNOSIS — K76.6 PORTAL HYPERTENSION (H): ICD-10-CM

## 2022-09-15 LAB
ABO/RH(D): NORMAL
ALBUMIN SERPL BCG-MCNC: 3.8 G/DL (ref 3.5–5.2)
ALP SERPL-CCNC: 102 U/L (ref 40–129)
ALT SERPL W P-5'-P-CCNC: 97 U/L (ref 10–50)
ANION GAP SERPL CALCULATED.3IONS-SCNC: 10 MMOL/L (ref 7–15)
ANTIBODY SCREEN: NEGATIVE
AST SERPL W P-5'-P-CCNC: 122 U/L (ref 10–50)
BASOPHILS # BLD AUTO: 0 10E3/UL (ref 0–0.2)
BASOPHILS NFR BLD AUTO: 0 %
BILIRUB DIRECT SERPL-MCNC: 0.58 MG/DL (ref 0–0.3)
BILIRUB SERPL-MCNC: 1.6 MG/DL
BLD PROD TYP BPU: NORMAL
BLD PROD TYP BPU: NORMAL
BLOOD COMPONENT TYPE: NORMAL
BLOOD COMPONENT TYPE: NORMAL
BUN SERPL-MCNC: 19.4 MG/DL (ref 8–23)
CALCIUM SERPL-MCNC: 10.8 MG/DL (ref 8.8–10.2)
CHLORIDE SERPL-SCNC: 103 MMOL/L (ref 98–107)
CODING SYSTEM: NORMAL
CODING SYSTEM: NORMAL
CREAT SERPL-MCNC: 0.95 MG/DL (ref 0.67–1.17)
CROSSMATCH: NORMAL
CROSSMATCH: NORMAL
DEPRECATED HCO3 PLAS-SCNC: 24 MMOL/L (ref 22–29)
EOSINOPHIL # BLD AUTO: 0 10E3/UL (ref 0–0.7)
EOSINOPHIL NFR BLD AUTO: 0 %
ERYTHROCYTE [DISTWIDTH] IN BLOOD BY AUTOMATED COUNT: 21.8 % (ref 10–15)
ETHANOL SERPL-MCNC: <0.01 G/DL
GFR SERPL CREATININE-BSD FRML MDRD: >90 ML/MIN/1.73M2
GLUCOSE SERPL-MCNC: 144 MG/DL (ref 70–99)
HCT VFR BLD AUTO: 26.9 % (ref 40–53)
HEMOCCULT STL QL: POSITIVE
HGB BLD-MCNC: 7.5 G/DL (ref 13.3–17.7)
HGB BLD-MCNC: 7.7 G/DL (ref 13.3–17.7)
HOLD SPECIMEN: NORMAL
IMM GRANULOCYTES # BLD: 0 10E3/UL
IMM GRANULOCYTES NFR BLD: 0 %
INR PPP: 1.34 (ref 0.85–1.15)
ISSUE DATE AND TIME: NORMAL
ISSUE DATE AND TIME: NORMAL
LYMPHOCYTES # BLD AUTO: 0.5 10E3/UL (ref 0.8–5.3)
LYMPHOCYTES NFR BLD AUTO: 7 %
MCH RBC QN AUTO: 21.1 PG (ref 26.5–33)
MCHC RBC AUTO-ENTMCNC: 27.9 G/DL (ref 31.5–36.5)
MCV RBC AUTO: 76 FL (ref 78–100)
MONOCYTES # BLD AUTO: 0.7 10E3/UL (ref 0–1.3)
MONOCYTES NFR BLD AUTO: 12 %
NEUTROPHILS # BLD AUTO: 5 10E3/UL (ref 1.6–8.3)
NEUTROPHILS NFR BLD AUTO: 81 %
NRBC # BLD AUTO: 0 10E3/UL
NRBC BLD AUTO-RTO: 0 /100
PLATELET # BLD AUTO: 106 10E3/UL (ref 150–450)
POTASSIUM SERPL-SCNC: 4.4 MMOL/L (ref 3.4–5.3)
PROT SERPL-MCNC: 6.8 G/DL (ref 6.4–8.3)
RBC # BLD AUTO: 3.55 10E6/UL (ref 4.4–5.9)
SARS-COV-2 RNA RESP QL NAA+PROBE: NEGATIVE
SODIUM SERPL-SCNC: 137 MMOL/L (ref 136–145)
SPECIMEN EXPIRATION DATE: NORMAL
UNIT ABO/RH: NORMAL
UNIT ABO/RH: NORMAL
UNIT NUMBER: NORMAL
UNIT NUMBER: NORMAL
UNIT STATUS: NORMAL
UNIT STATUS: NORMAL
UNIT TYPE ISBT: 7300
UNIT TYPE ISBT: 7300
UPPER GI ENDOSCOPY: NORMAL
WBC # BLD AUTO: 6.2 10E3/UL (ref 4–11)

## 2022-09-15 PROCEDURE — 0DJ08ZZ INSPECTION OF UPPER INTESTINAL TRACT, VIA NATURAL OR ARTIFICIAL OPENING ENDOSCOPIC: ICD-10-PCS | Performed by: INTERNAL MEDICINE

## 2022-09-15 PROCEDURE — 120N000001 HC R&B MED SURG/OB

## 2022-09-15 PROCEDURE — 99285 EMERGENCY DEPT VISIT HI MDM: CPT | Mod: 25

## 2022-09-15 PROCEDURE — 86923 COMPATIBILITY TEST ELECTRIC: CPT | Performed by: INTERNAL MEDICINE

## 2022-09-15 PROCEDURE — 86923 COMPATIBILITY TEST ELECTRIC: CPT | Performed by: EMERGENCY MEDICINE

## 2022-09-15 PROCEDURE — 99207 PR APP CREDIT; MD BILLING SHARED VISIT: CPT | Performed by: PHYSICIAN ASSISTANT

## 2022-09-15 PROCEDURE — U0005 INFEC AGEN DETEC AMPLI PROBE: HCPCS | Performed by: EMERGENCY MEDICINE

## 2022-09-15 PROCEDURE — 85610 PROTHROMBIN TIME: CPT | Performed by: EMERGENCY MEDICINE

## 2022-09-15 PROCEDURE — 36415 COLL VENOUS BLD VENIPUNCTURE: CPT | Performed by: EMERGENCY MEDICINE

## 2022-09-15 PROCEDURE — C9113 INJ PANTOPRAZOLE SODIUM, VIA: HCPCS | Performed by: INTERNAL MEDICINE

## 2022-09-15 PROCEDURE — 36430 TRANSFUSION BLD/BLD COMPNT: CPT

## 2022-09-15 PROCEDURE — 250N000011 HC RX IP 250 OP 636: Performed by: INTERNAL MEDICINE

## 2022-09-15 PROCEDURE — 96365 THER/PROPH/DIAG IV INF INIT: CPT

## 2022-09-15 PROCEDURE — 999N000099 HC STATISTIC MODERATE SEDATION < 10 MIN: Performed by: INTERNAL MEDICINE

## 2022-09-15 PROCEDURE — 258N000003 HC RX IP 258 OP 636: Performed by: INTERNAL MEDICINE

## 2022-09-15 PROCEDURE — 36415 COLL VENOUS BLD VENIPUNCTURE: CPT | Performed by: INTERNAL MEDICINE

## 2022-09-15 PROCEDURE — P9016 RBC LEUKOCYTES REDUCED: HCPCS | Performed by: INTERNAL MEDICINE

## 2022-09-15 PROCEDURE — 96375 TX/PRO/DX INJ NEW DRUG ADDON: CPT

## 2022-09-15 PROCEDURE — 82310 ASSAY OF CALCIUM: CPT | Performed by: EMERGENCY MEDICINE

## 2022-09-15 PROCEDURE — 82272 OCCULT BLD FECES 1-3 TESTS: CPT | Performed by: EMERGENCY MEDICINE

## 2022-09-15 PROCEDURE — 82248 BILIRUBIN DIRECT: CPT | Performed by: EMERGENCY MEDICINE

## 2022-09-15 PROCEDURE — 85018 HEMOGLOBIN: CPT | Performed by: INTERNAL MEDICINE

## 2022-09-15 PROCEDURE — 43235 EGD DIAGNOSTIC BRUSH WASH: CPT | Performed by: INTERNAL MEDICINE

## 2022-09-15 PROCEDURE — 85014 HEMATOCRIT: CPT | Performed by: EMERGENCY MEDICINE

## 2022-09-15 PROCEDURE — 258N000003 HC RX IP 258 OP 636: Performed by: EMERGENCY MEDICINE

## 2022-09-15 PROCEDURE — 250N000009 HC RX 250: Performed by: INTERNAL MEDICINE

## 2022-09-15 PROCEDURE — 96361 HYDRATE IV INFUSION ADD-ON: CPT

## 2022-09-15 PROCEDURE — 99212 OFFICE O/P EST SF 10 MIN: CPT | Mod: 95 | Performed by: NURSE PRACTITIONER

## 2022-09-15 PROCEDURE — 82077 ASSAY SPEC XCP UR&BREATH IA: CPT | Performed by: EMERGENCY MEDICINE

## 2022-09-15 PROCEDURE — 250N000013 HC RX MED GY IP 250 OP 250 PS 637: Performed by: EMERGENCY MEDICINE

## 2022-09-15 PROCEDURE — 86901 BLOOD TYPING SEROLOGIC RH(D): CPT | Performed by: EMERGENCY MEDICINE

## 2022-09-15 PROCEDURE — P9016 RBC LEUKOCYTES REDUCED: HCPCS | Performed by: EMERGENCY MEDICINE

## 2022-09-15 PROCEDURE — 86850 RBC ANTIBODY SCREEN: CPT | Performed by: EMERGENCY MEDICINE

## 2022-09-15 PROCEDURE — C9803 HOPD COVID-19 SPEC COLLECT: HCPCS

## 2022-09-15 PROCEDURE — 96366 THER/PROPH/DIAG IV INF ADDON: CPT

## 2022-09-15 RX ORDER — NALOXONE HYDROCHLORIDE 0.4 MG/ML
0.4 INJECTION, SOLUTION INTRAMUSCULAR; INTRAVENOUS; SUBCUTANEOUS
Status: DISCONTINUED | OUTPATIENT
Start: 2022-09-15 | End: 2022-09-15 | Stop reason: HOSPADM

## 2022-09-15 RX ORDER — NALOXONE HYDROCHLORIDE 0.4 MG/ML
0.2 INJECTION, SOLUTION INTRAMUSCULAR; INTRAVENOUS; SUBCUTANEOUS
Status: DISCONTINUED | OUTPATIENT
Start: 2022-09-15 | End: 2022-09-15 | Stop reason: HOSPADM

## 2022-09-15 RX ORDER — EPINEPHRINE 1 MG/ML
0.1 INJECTION, SOLUTION, CONCENTRATE INTRAVENOUS
Status: DISCONTINUED | OUTPATIENT
Start: 2022-09-15 | End: 2022-09-15 | Stop reason: HOSPADM

## 2022-09-15 RX ORDER — FLUMAZENIL 0.1 MG/ML
0.2 INJECTION, SOLUTION INTRAVENOUS
Status: ACTIVE | OUTPATIENT
Start: 2022-09-15 | End: 2022-09-16

## 2022-09-15 RX ORDER — SIMETHICONE 80 MG
160 TABLET,CHEWABLE ORAL
Status: DISCONTINUED | OUTPATIENT
Start: 2022-09-15 | End: 2022-09-15 | Stop reason: HOSPADM

## 2022-09-15 RX ORDER — FLUMAZENIL 0.1 MG/ML
0.2 INJECTION, SOLUTION INTRAVENOUS
Status: DISCONTINUED | OUTPATIENT
Start: 2022-09-15 | End: 2022-09-15 | Stop reason: HOSPADM

## 2022-09-15 RX ORDER — POLYETHYLENE GLYCOL 3350 17 G/17G
17 POWDER, FOR SOLUTION ORAL DAILY PRN
Status: DISCONTINUED | OUTPATIENT
Start: 2022-09-15 | End: 2022-09-19 | Stop reason: HOSPADM

## 2022-09-15 RX ORDER — ACETAMINOPHEN 650 MG/1
325 SUPPOSITORY RECTAL EVERY 6 HOURS PRN
Status: DISCONTINUED | OUTPATIENT
Start: 2022-09-15 | End: 2022-09-19 | Stop reason: HOSPADM

## 2022-09-15 RX ORDER — LIDOCAINE 40 MG/G
CREAM TOPICAL
Status: DISCONTINUED | OUTPATIENT
Start: 2022-09-15 | End: 2022-09-19 | Stop reason: HOSPADM

## 2022-09-15 RX ORDER — OCTREOTIDE ACETATE 50 UG/ML
50 INJECTION, SOLUTION INTRAVENOUS; SUBCUTANEOUS ONCE
Status: COMPLETED | OUTPATIENT
Start: 2022-09-15 | End: 2022-09-15

## 2022-09-15 RX ORDER — ACETAMINOPHEN 325 MG/1
325 TABLET ORAL EVERY 6 HOURS PRN
Status: DISCONTINUED | OUTPATIENT
Start: 2022-09-15 | End: 2022-09-19 | Stop reason: HOSPADM

## 2022-09-15 RX ORDER — SODIUM CHLORIDE 9 MG/ML
INJECTION, SOLUTION INTRAVENOUS CONTINUOUS
Status: ACTIVE | OUTPATIENT
Start: 2022-09-15 | End: 2022-09-16

## 2022-09-15 RX ORDER — CETIRIZINE HYDROCHLORIDE 10 MG/1
10 TABLET ORAL DAILY PRN
COMMUNITY
End: 2024-03-05

## 2022-09-15 RX ORDER — FENTANYL CITRATE 50 UG/ML
50-100 INJECTION, SOLUTION INTRAMUSCULAR; INTRAVENOUS EVERY 5 MIN PRN
Status: DISCONTINUED | OUTPATIENT
Start: 2022-09-15 | End: 2022-09-15 | Stop reason: HOSPADM

## 2022-09-15 RX ORDER — DIPHENHYDRAMINE HYDROCHLORIDE 50 MG/ML
25-50 INJECTION INTRAMUSCULAR; INTRAVENOUS
Status: COMPLETED | OUTPATIENT
Start: 2022-09-15 | End: 2022-09-15

## 2022-09-15 RX ORDER — AMOXICILLIN 250 MG
2 CAPSULE ORAL 2 TIMES DAILY PRN
Status: DISCONTINUED | OUTPATIENT
Start: 2022-09-15 | End: 2022-09-19 | Stop reason: HOSPADM

## 2022-09-15 RX ORDER — AMOXICILLIN 250 MG
1 CAPSULE ORAL 2 TIMES DAILY PRN
Status: DISCONTINUED | OUTPATIENT
Start: 2022-09-15 | End: 2022-09-19 | Stop reason: HOSPADM

## 2022-09-15 RX ORDER — LIDOCAINE 40 MG/G
CREAM TOPICAL
Status: DISCONTINUED | OUTPATIENT
Start: 2022-09-15 | End: 2022-09-15 | Stop reason: HOSPADM

## 2022-09-15 RX ORDER — ONDANSETRON 4 MG/1
4 TABLET, ORALLY DISINTEGRATING ORAL EVERY 6 HOURS PRN
Status: DISCONTINUED | OUTPATIENT
Start: 2022-09-15 | End: 2022-09-19 | Stop reason: HOSPADM

## 2022-09-15 RX ORDER — ATROPINE SULFATE 0.1 MG/ML
1 INJECTION INTRAVENOUS
Status: DISCONTINUED | OUTPATIENT
Start: 2022-09-15 | End: 2022-09-15 | Stop reason: HOSPADM

## 2022-09-15 RX ORDER — PANTOPRAZOLE SODIUM 20 MG/1
40 TABLET, DELAYED RELEASE ORAL ONCE
Status: COMPLETED | OUTPATIENT
Start: 2022-09-15 | End: 2022-09-15

## 2022-09-15 RX ORDER — ONDANSETRON 2 MG/ML
4 INJECTION INTRAMUSCULAR; INTRAVENOUS EVERY 6 HOURS PRN
Status: DISCONTINUED | OUTPATIENT
Start: 2022-09-15 | End: 2022-09-19 | Stop reason: HOSPADM

## 2022-09-15 RX ADMIN — SODIUM CHLORIDE 1000 ML: 9 INJECTION, SOLUTION INTRAVENOUS at 11:00

## 2022-09-15 RX ADMIN — MIDAZOLAM HYDROCHLORIDE 1 MG: 1 INJECTION, SOLUTION INTRAMUSCULAR; INTRAVENOUS at 13:28

## 2022-09-15 RX ADMIN — OCTREOTIDE ACETATE 50 MCG/HR: 200 INJECTION, SOLUTION INTRAVENOUS; SUBCUTANEOUS at 14:30

## 2022-09-15 RX ADMIN — SODIUM CHLORIDE, PRESERVATIVE FREE: 5 INJECTION INTRAVENOUS at 18:48

## 2022-09-15 RX ADMIN — TOPICAL ANESTHETIC 0.5 ML: 200 SPRAY DENTAL; PERIODONTAL at 13:23

## 2022-09-15 RX ADMIN — MIDAZOLAM HYDROCHLORIDE 2 MG: 1 INJECTION, SOLUTION INTRAMUSCULAR; INTRAVENOUS at 13:23

## 2022-09-15 RX ADMIN — OCTREOTIDE ACETATE 50 MCG: 50 INJECTION, SOLUTION INTRAVENOUS; SUBCUTANEOUS at 14:29

## 2022-09-15 RX ADMIN — FENTANYL CITRATE 100 MCG: 50 INJECTION INTRAMUSCULAR; INTRAVENOUS at 13:23

## 2022-09-15 RX ADMIN — PANTOPRAZOLE SODIUM 40 MG: 20 TABLET, DELAYED RELEASE ORAL at 11:57

## 2022-09-15 RX ADMIN — DIPHENHYDRAMINE HYDROCHLORIDE 25 MG: 50 INJECTION, SOLUTION INTRAMUSCULAR; INTRAVENOUS at 13:23

## 2022-09-15 RX ADMIN — PANTOPRAZOLE SODIUM 40 MG: 40 INJECTION, POWDER, FOR SOLUTION INTRAVENOUS at 20:17

## 2022-09-15 ASSESSMENT — ACTIVITIES OF DAILY LIVING (ADL)
ADLS_ACUITY_SCORE: 24
ADLS_ACUITY_SCORE: 35
ADLS_ACUITY_SCORE: 35
ADLS_ACUITY_SCORE: 24
ADLS_ACUITY_SCORE: 35
ADLS_ACUITY_SCORE: 35
ADLS_ACUITY_SCORE: 24

## 2022-09-15 ASSESSMENT — ENCOUNTER SYMPTOMS
FATIGUE: 1
ROS GI COMMENTS: BLACK STOOL
VOMITING: 0
NAUSEA: 1
APPETITE CHANGE: 1
ABDOMINAL PAIN: 0

## 2022-09-15 NOTE — PHARMACY-ADMISSION MEDICATION HISTORY
Admission medication history interview status for this patient is complete. See [facility] admission navigator for allergy information, prior to admission medications and immunization status.     Medication history interview done, indicate source(s): Patient  Medication history resources (including written lists, pill bottles, clinic record):None    Changes made to PTA medication list:  Added: zyrtec prn(in addition to Xyzal)  Changed: none  Reported as Not Taking: none  Removed: none    Actions taken by pharmacist (provider contacted, etc):None     Additional medication history information:None    Medication reconciliation/reorder completed by provider prior to medication history?  N   (Y/N)     Prior to Admission medications    Medication Sig Last Dose Taking? Auth Provider Long Term End Date   cetirizine (ZYRTEC) 10 MG tablet Take 10 mg by mouth daily as needed for allergies  Yes Unknown, Entered By History     levocetirizine (XYZAL) 5 MG tablet Take 5 mg by mouth every evening 9/14/2022 at Unknown time Yes Unknown, Entered By History

## 2022-09-15 NOTE — ED PROVIDER NOTES
History   Chief Complaint:  Melena and Fatigue       The history is provided by the patient and the spouse.      Yakov Kahn is a 61 year old male with history of hypertension, alcoholic cirrhosis and anemia who presents with melena and fatigue. The patient reports feeling nauseated yesterday with little appetite followed by an episode of black stool this morning. He also reports significant fatigue. The patient notes that he has been on a fasting diet, and yesterday he felt extra hungry however food was not sitting well. He denies any gross blood in his stool, abdominal pain or vomiting. He reports history of significant anemia in May of 2021, and his wife states that his current symptoms seem the same as they were then. The patient states that he had an endoscopy and also swallowed a pill camera at that point, however they never found the cause of the bleeding. Lastly, he denies taking any daily medications or having any family history of bleeding problems/anemia.    Review of Systems   Constitutional: Positive for appetite change and fatigue.   Gastrointestinal: Positive for nausea. Negative for abdominal pain and vomiting.        Black stool   All other systems reviewed and are negative.    Allergies:  The patient has no known allergies.     Medications:  Levocetrizine    Past Medical History:     Plantar fascial fibromatosis  Essential hypertension  Upper GI bleed  Anemia due to blood loss, acute  Alcoholic cirrhosis  Clostridium difficile diarrhea    Past Surgical History:    Esophagogastroduodenoscopy     Family History:    Brother: hypertension    Social History:  The patient presents to the ED with his wife  PCP: Humberto Vora     Physical Exam     Patient Vitals for the past 24 hrs:   BP Temp Temp src Pulse Resp SpO2 Height Weight   09/15/22 1500 110/69 98.6  F (37  C) Oral 83 -- 97 % -- --   09/15/22 1451 122/64 98  F (36.7  C) -- 83 18 -- -- --   09/15/22 1450 122/64 -- -- 83 -- 95 % -- --  "  09/15/22 1440 123/65 -- -- -- -- 92 % -- --   09/15/22 1430 123/70 -- -- 90 -- 93 % -- --   09/15/22 1420 123/62 -- -- 95 -- 94 % -- --   09/15/22 1410 114/72 -- -- 95 16 93 % -- --   09/15/22 1400 112/71 -- -- 96 16 93 % -- --   09/15/22 1350 116/70 -- -- 98 16 94 % -- --   09/15/22 1342 116/71 -- -- 102 14 95 % -- --   09/15/22 1330 (!) 142/82 -- -- 79 13 95 % -- --   09/15/22 1329 -- -- -- 79 10 96 % -- --   09/15/22 1328 -- -- -- 78 11 97 % -- --   09/15/22 1326 -- -- -- 99 21 100 % -- --   09/15/22 1325 (!) 149/92 -- -- 97 17 98 % -- --   09/15/22 1323 -- -- -- 108 19 97 % -- --   09/15/22 1320 137/89 -- -- 102 17 97 % -- --   09/15/22 1310 -- 97.8  F (36.6  C) Temporal -- -- -- 1.727 m (5' 8\") 90.5 kg (199 lb 9.6 oz)   09/15/22 1100 (!) 140/76 -- -- 97 -- 96 % -- --   09/15/22 1045 138/75 -- -- 99 -- 96 % -- --   09/15/22 1030 139/74 -- -- -- -- -- -- --   09/15/22 0948 (!) 161/99 98.1  F (36.7  C) Temporal 109 16 99 % -- --       Physical Exam  Constitutional: Vital signs reviewed as above.   HENT:               Head: No external signs of trauma noted.              Eyes: Conjunctivae are mildly pale. Pupils are equal, round, and reactive to light.   Cardiovascular:               Tachycardic rate, regular rhythm and normal heart sounds.                Exam reveals no friction rub.                No murmur heard.  Pulmonary/Chest:               Effort normal and breath sounds normal.               No respiratory distress.               There are no wheezes.               There are no rales.   Gastrointestinal:               Soft.               Bowel sounds normal.               There is no distension.               There is no tenderness on my exam.               There is no rebound or guarding.   Rectal               Normal tone              No gross blood              Melanotic stool noted              No external hemorrhoids noted              No anal fissures noted              There is stool palpated in " rectal vault    Musculoskeletal:               Normal range of motion.               Normal Tone  Neurological: Patient is alert and oriented to person, place, and time.   Skin: Skin is warm and dry. Patient is not diaphoretic. No overt jaundice noted.  Psychiatric: The patient appears calm    Emergency Department Course     Laboratory:  Labs Ordered and Resulted from Time of ED Arrival to Time of ED Departure   BASIC METABOLIC PANEL - Abnormal       Result Value    Sodium 137      Potassium 4.4      Chloride 103      Carbon Dioxide (CO2) 24      Anion Gap 10      Urea Nitrogen 19.4      Creatinine 0.95      Calcium 10.8 (*)     Glucose 144 (*)     GFR Estimate >90     HEPATIC FUNCTION PANEL - Abnormal    Protein Total 6.8      Albumin 3.8      Bilirubin Total 1.6 (*)     Alkaline Phosphatase 102       (*)     ALT 97 (*)     Bilirubin Direct 0.58 (*)    OCCULT BLOOD STOOL - Abnormal    Occult Blood Positive (*)    INR - Abnormal    INR 1.34 (*)    ETHYL ALCOHOL LEVEL - Abnormal    Alcohol ethyl <0.01 (*)    CBC WITH PLATELETS AND DIFFERENTIAL - Abnormal    WBC Count 6.2      RBC Count 3.55 (*)     Hemoglobin 7.5 (*)     Hematocrit 26.9 (*)     MCV 76 (*)     MCH 21.1 (*)     MCHC 27.9 (*)     RDW 21.8 (*)     Platelet Count 106 (*)     % Neutrophils 81      % Lymphocytes 7      % Monocytes 12      % Eosinophils 0      % Basophils 0      % Immature Granulocytes 0      NRBCs per 100 WBC 0      Absolute Neutrophils 5.0      Absolute Lymphocytes 0.5 (*)     Absolute Monocytes 0.7      Absolute Eosinophils 0.0      Absolute Basophils 0.0      Absolute Immature Granulocytes 0.0      Absolute NRBCs 0.0     TYPE AND SCREEN, ADULT    ABO/RH(D) B POS      Antibody Screen Negative      SPECIMEN EXPIRATION DATE [id number]     PREPARE RED BLOOD CELLS (UNIT)    Blood Component Type Red Blood Cells      Product Code [id number]      Unit Status Transfused      Unit Number [id number]      CROSSMATCH Compatible       CODING SYSTEM [id number]      ISSUE DATE AND TIME [id number]      UNIT ABO/RH B+      UNIT TYPE ISBT 7300     PREPARE RED BLOOD CELLS (UNIT)        Emergency Department Course:           Reviewed:  I reviewed nursing notes, vitals, past medical history and Care Everywhere    Assessments/consults:  ED Course as of 09/15/22 1516   Thu Sep 15, 2022   0918 I obtained history and examined the patient as noted above.    1155 Patient rechecked and updated.      1211 I consulted with Dr. Pace GI, regarding the patients history and presentation in the emergency department.     1220 D/W Preethi Mora PA-C. Accepting for Dr. Mark.       Interventions:  1100 NS 1L IV  1157 Protonix 40 mg PO  1 unit RBCs IV    Disposition:  The patient was admitted to the hospital under the care of Dr. Mark.     Impression & Plan     CMS Diagnoses: None    Medical Decision Making:    This 61-year-old male patient presents to the ED due to dark stools and fatigue.  Please see the HPI and exam for specifics.  A broad differential was considered including gastrointestinal hemorrhage, electrolyte dysfunction, etc.  The above work-up was undertaken.     Laboratory studies were ordered and results are notable for: anemia, thrombocytopenia, elevated LFTs, elevated bilirubin, elevated INR, and occult positive stool.     Based on the patient's symptoms and laboratory findings, I was not surprised to see that he is anemic.  Blood transfusion will be ordered and given his prior history, I discussed the case with the hospitalist who will accept the patient for admission.  I also talked with gastroenterology.  The patient was taken to endoscopy for they found nonbleeding varices.  They recommend the patient be given Protonix IV as well as octreotide and they will take him back for another endoscopy and likely banding tomorrow.    Critical Care Time: was 0 minutes for this patient excluding procedures    Covid-19  Yakov Kahn was evaluated  during a global COVID-19 pandemic, which necessitated consideration that the patient might be at risk for infection with the SARS-CoV-2 virus that causes COVID-19.   Applicable protocols for evaluation were followed during the patient's care.   COVID-19 was considered as part of the patient's evaluation. The plan for testing is:  a test was obtained during this visit.    Diagnosis:    ICD-10-CM    1. Melena  K92.1    2. Anemia, unspecified type  D64.9        Scribe Disclosure:  MADELYN, Rocío Ruiz, am serving as a scribe at 9:53 AM on 9/15/2022 to document services personally performed by Marcin Glover DO based on my observations and the provider's statements to me.              Marcin Glover DO  09/15/22 1537

## 2022-09-15 NOTE — H&P
[facility]    History and Physical - Hospitalist Service       Date of Admission:  9/15/2022    Assessment & Plan      Yakov Kahn is a 61 year old male with PMHx significant for alcoholic cirrhosis, HTN, hx of probable upper GI bleed, hx of grade I esophageal varices and hx of c diff, who was admitted on 9/15/2022 with melena and concerns for upper GI bleeding.   He has a hx of similar presentation and source of blood loss not identified.    1. Melena  Acute blood loss anemia  Probable upper GI bleed  Hx of alcoholic cirrhosis  Developed fatigue yesterday with mild nausea and poor appetite, one episode of melena today. Denies hilary blood per rectum, maroon colored stool, or hematemesis. Denies fever, abdominal pain or SOB. He notes very rare alcohol use, had a few beers over Labor Day weekend. Hgb 7.5 in ED, platelets 106, stool occult positive. 1 unit PRBCs ordered in ED. GI contacted from ED, planning for EGD today. Received 40 mg PO Protonix in ED  - admit to inpatient  - IV Protonix BID  - GI consult, EGD pending  - recheck Hgb this evening  - NPO until EGD complete, diet after per GI and EGD results  - transfuse for Hgb <7.0    2. HTN  Not on medications, monitor    Covid-19 negative     Diet:   NPO  DVT Prophylaxis: Low Risk/Ambulatory with no VTE prophylaxis indicated  Ugarte Catheter: Not present  Central Lines: None  Cardiac Monitoring: None  Code Status:   full code    Clinically Significant Risk Factors Present on Admission          # Hypercalcemia: Ca = 10.8 mg/dL (Ref range: 8.8 - 10.2 mg/dL) and/or iCa = N/A on admission, will monitor as appropriate      # Coagulation Defect: INR = 1.34 (Ref range: 0.85 - 1.15) and/or PTT = N/A on admission, will monitor for bleeding  # Thrombocytopenia: Plts = 106 10e3/uL (Ref range: 150 - 450 10e3/uL) on admission, will monitor for bleeding           Disposition Plan      Expected Discharge Date: 09/17/2022                The patient's care  was discussed with the Attending Physician, Dr. Mark, Patient, Patient's Family and ED provider, Dr. Glover and GI consultant.    Preethi Arauz PA-C  Hospitalist Service  [facility]  Securely message with the Vocera Web Console (learn more here)  Text page via [facility] Paging/Directory         ______________________________________________________________________    Chief Complaint   Melena, fatigue    History is obtained from the patient    History of Present Illness   Yakov Kahn is a 61 year old male with PMHx significant for alcoholic cirrhosis, HTN, hx of probable upper GI bleed, hx of grade I esophageal varices and hx of c diff, who presents to the ED with fatigue and melena. He noted onset of fatigue, poor appetite, and mild nausea yesterday. He states he mowed his lawn on Sunday without any difficulty. He then had a black stool this morning prompting him to present to the ED. He had a similar hx last year where he was found to have a hgb of 3.0. He underwent EGD, colonoscopy and pill cam study without identifying source of bleeding. He was placed on a PPI for 30 days. He denies fever, chills, chest pain, SOB, abd pain, vomiting, diarrhea or dysuria. He denies heavy alcohol use but notes very occasional alcohol use, last drinking over Labor Day weekend, a few beers.   In the ED, VSS. CMP shows very mild elevated in ALT and AST at 97 and 122 respectively. Total bilirubin is elevated at 1.6. Stool occult is positive. CBC shows a normal WBC, low Hgb at 7.5 and low platelets at 106. INR 1.34. EtOH <0.01. Covid-19 negative. He received 1L NS bolus and 40 mg PO Protonix. GI was contacted from the ED and planning for EGD today.    Review of Systems    The 10 point Review of Systems is negative other than noted in the HPI or here.     Past Medical History    I have reviewed this patient's medical history and updated it with pertinent information if needed.   Past Medical History:    Diagnosis Date     Alcohol abuse      Benign essential hypertension      Cirrhosis of liver - by imaging, suspect EtOH related      Esophageal varices      h/o Upper GI bleed 05/2021    Admitted hgb of 3.0.   5 units PRBC transfused.     Iron deficiency anemia due to chronic blood loss        Past Surgical History   I have reviewed this patient's surgical history and updated it with pertinent information if needed.  Past Surgical History:   Procedure Laterality Date     ESOPHAGOSCOPY, GASTROSCOPY, DUODENOSCOPY (EGD), COMBINED Left 5/17/2021    Procedure: ESOPHAGOGASTRODUODENOSCOPY (EGD);  Surgeon: Rey Zamora MD;  Location:  GI       Social History   I have reviewed this patient's social history and updated it with pertinent information if needed.  Social History     Tobacco Use     Smoking status: Never Smoker     Smokeless tobacco: Former User   Vaping Use     Vaping Use: Never used   Substance Use Topics     Alcohol use: Yes     Drug use: No       Family History   I have reviewed this patient's family history and updated it with pertinent information if needed.  Family History   Problem Relation Age of Onset     Hypertension Brother      Diabetes No family hx of      Other Cancer No family hx of        Prior to Admission Medications   Prior to Admission Medications   Prescriptions Last Dose Informant Patient Reported? Taking?   levocetirizine (XYZAL) 5 MG tablet   Yes No   Sig: Take 5 mg by mouth every evening      Facility-Administered Medications: None     Allergies   No Known Allergies    Physical Exam   Vital Signs: Temp: 98.1  F (36.7  C) Temp src: Temporal BP: (!) 140/76 Pulse: 97   Resp: 16 SpO2: 96 % O2 Device: None (Room air)    Weight: 0 lbs 0 oz    GENERAL:  Comfortable.  PSYCH: pleasant, oriented, No acute distress.  HEENT:  Atraumatic, normocephalic. Normal conjunctiva, normal hearing, and oropharynx is normal.  NECK:  Supple, no neck vein distention  HEART:  Normal S1, S2 with no  murmur, no pericardial rub, gallops or S3 or S4.  LUNGS:  Clear to auscultation, normal Respiratory effort. No wheezing, rales or ronchi.  GI:  Soft, normal bowel sounds. Non-tender, non distended.   EXTREMITIES:  No pedal edema, +2 pulses bilateral and equal.  SKIN:  Dry to touch, No rash, wound or ulcerations.  NEUROLOGIC:  CN 2-12 intact, BL 5/5 symmetric upper and lower extremity strength, sensation is intact with no focal deficits.      Data   Data reviewed today: I reviewed all medications, new labs and imaging results over the last 24 hours. I personally reviewed no images or EKG's today.    Most Recent 3 CBC's:Recent Labs   Lab Test 09/15/22  1027 06/17/21  1214 06/05/21  0939   WBC 6.2 15.6* 6.1   HGB 7.5* 12.7* 11.2*   MCV 76* 90 90   * 174 163     Most Recent 3 BMP's:Recent Labs   Lab Test 09/15/22  1027 06/17/21  1214 06/05/21  0939    135 137   POTASSIUM 4.4 3.8 3.8   CHLORIDE 103 105 109   CO2 24 23 21   BUN 19.4 15 11   CR 0.95 1.06 1.05   ANIONGAP 10 7 7   DEXTER 10.8* 8.9 8.8   * 108* 199*     Most Recent 2 LFT's:Recent Labs   Lab Test 09/15/22  1027 06/17/21  1214   * 40   ALT 97* 36   ALKPHOS 102 108   BILITOTAL 1.6* 3.3*     Most Recent 3 INR's:Recent Labs   Lab Test 09/15/22  1027 12/03/21  0746 06/28/21  0914   INR 1.34* 1.1 1.1     No results found for this or any previous visit (from the past 24 hour(s)).

## 2022-09-15 NOTE — PROGRESS NOTES
[facility]  Pre-Endoscopy History and Physical     Yakov Kahn MRN# [id number]   YOB: 1960 Age: 61 year old   Today's Date: 09/15/2022    Date of Procedure: 9/15/2022  Primary care provider: Humberto Vora  Type of Endoscopy: esophagogastroduodenoscopy (upper GI endoscopy)  Reason for Procedure: Hx melena / anemia  Type of Anesthesia Anticipated: Moderate (conscious) sedation    HPI:    Yakov is a 61 year old male who will be undergoing the above procedure.      A history and physical has been performed. The patient's medications and allergies have been reviewed. The risks and benefits of the procedure and the sedation options and risks were discussed with the patient.  All questions were answered and informed consent was obtained.      No Known Allergies     Current Facility-Administered Medications   Medication     0.9% sodium chloride BOLUS     atropine injection 1 mg     benzocaine 20% (HURRICAINE/TOPEX) 20 % spray 0.5 mL     diphenhydrAMINE (BENADRYL) injection 25-50 mg     EPINEPHrine PF (ADRENALIN) injection 0.1 mg     fentaNYL (PF) (SUBLIMAZE) injection  mcg     flumazenil (ROMAZICON) injection 0.2 mg     glucagon injection 0.5 mg     midazolam (VERSED) injection 0.5-2 mg     naloxone (NARCAN) injection 0.2 mg    Or     naloxone (NARCAN) injection 0.4 mg    Or     naloxone (NARCAN) injection 0.2 mg    Or     naloxone (NARCAN) injection 0.4 mg     simethicone (MYLICON) suspension 133 mg     sodium chloride (PF) 0.9% PF flush 3 mL     Current Outpatient Medications   Medication     levocetirizine (XYZAL) 5 MG tablet       Patient Active Problem List   Diagnosis     Plantar fascial fibromatosis     CARDIOVASCULAR SCREENING; LDL GOAL LESS THAN 160     Essential hypertension     Upper GI bleed     Anemia due to blood loss, acute     Alcoholic cirrhosis, unspecified whether ascites present (H)     Clostridium difficile diarrhea     Melena     Anemia, unspecified type  "       Past Medical History:   Diagnosis Date     Alcohol abuse      Benign essential hypertension      Cirrhosis of liver - by imaging, suspect EtOH related      Esophageal varices      h/o Upper GI bleed 05/2021    Admitted hgb of 3.0.   5 units PRBC transfused.     Iron deficiency anemia due to chronic blood loss         Past Surgical History:   Procedure Laterality Date     ESOPHAGOSCOPY, GASTROSCOPY, DUODENOSCOPY (EGD), COMBINED Left 5/17/2021    Procedure: ESOPHAGOGASTRODUODENOSCOPY (EGD);  Surgeon: Rey Zamora MD;  Location:  GI       Social History     Tobacco Use     Smoking status: Never Smoker     Smokeless tobacco: Former User   Substance Use Topics     Alcohol use: Yes       Family History   Problem Relation Age of Onset     Hypertension Brother      Diabetes No family hx of      Other Cancer No family hx of          PHYSICAL EXAM:   BP (!) 140/76   Pulse 97   Temp 98.1  F (36.7  C) (Temporal)   Resp 16   SpO2 96%  Estimated body mass index is 29.76 kg/m  as calculated from the following:    Height as of 8/3/21: 1.727 m (5' 8\").    Weight as of 8/3/21: 88.8 kg (195 lb 11.2 oz).   RESP: lungs clear to auscultation - no rales, rhonchi or wheezes  CV: regular rates and rhythm    IMPRESSION   ASA Class 3 - Severe systemic disease, but not incapacitating  Mallampati Score: 2      Conrado Pace MD                  "

## 2022-09-15 NOTE — DISCHARGE INSTRUCTIONS
The patient has received a copy of the Provation  report the doctor has written and discharge instructions have been discussed with the patient and responsible adult.  All questions were addressed and answered prior to patient discharge.

## 2022-09-15 NOTE — CONSULTS
GASTROENTEROLOGY CONSULTATION    Yakov Kahn  [address] [address] [address]  61 year old male    Admission Date/Time: 9/15/2022  Primary Care Provider: Humberto Vora    We were asked to see the patient in consultation by Dr. Glover for evaluation of melena, anemia.      HPI: Yakov Kahn is a 61 year old male with PMH including hypertension, etoh cirrhosis, history of anemia presents to [facility] ED with melena.     Notes he had melena this morning, yesterday had abdominal discomfort and some nausea. No bright red blood per rectum. No vomiting. Had similar episode in May 2021, had EGD showing grade 1 varices (non-bleeding) otherwise unremarkable. Colonoscopy at that time showed internal hemorrhoids and polyps. Pill camera at that time also reported to be unremarkable. Sees Dr. Pink with [facility] for management of cirrhosis.     Hemoglobin in the ED at 7.5. LFTs are elevated: , ALT 97, tbili 1.6, alk phos 102.     Family history: no family history GI conditions.    Surgical history: denies prior abdominal surgeries.     Social history: No tobacco use. Used few tablets of Advil a week or so ago. Had 6 beers over labor day, rare etoh use aside from this.     ROS: A comprehensive ten point review of systems was negative aside from those in mentioned in the HPI.      MEDICATIONS: No current facility-administered medications on file prior to encounter.  levocetirizine (XYZAL) 5 MG tablet, Take 5 mg by mouth every evening        ALLERGIES: No Known Allergies    Past Medical History:   Diagnosis Date     Alcohol abuse      Benign essential hypertension      Cirrhosis of liver - by imaging, suspect EtOH related      Esophageal varices      h/o Upper GI bleed 05/2021    Admitted hgb of 3.0.   5 units PRBC transfused.     Iron deficiency anemia due to chronic blood loss        Past Surgical History:   Procedure Laterality Date     ESOPHAGOSCOPY, GASTROSCOPY, DUODENOSCOPY (EGD), COMBINED Left 5/17/2021     Procedure: ESOPHAGOGASTRODUODENOSCOPY (EGD);  Surgeon: Rey Zamora MD;  Location:  GI       SOCIAL HISTORY:  Social History     Tobacco Use     Smoking status: Never Smoker     Smokeless tobacco: Former User   Vaping Use     Vaping Use: Never used   Substance Use Topics     Alcohol use: Yes     Drug use: No       FAMILY HISTORY:  Family History   Problem Relation Age of Onset     Hypertension Brother      Diabetes No family hx of      Other Cancer No family hx of        PHYSICAL EXAM:   BP (!) 140/76   Pulse 97   Temp 98.1  F (36.7  C) (Temporal)   Resp 16   SpO2 96%    Constitutional: healthy, alert, no acute distress  Cardiovascular: regular rate and rhythm, no murmur or edema   Respiratory: clear to auscultation bilaterally  Psychiatric: normal pleasant affect  Head: atraumatic, normocephalic  ENT: no scleral icterus   Abdomen: soft, non-tender, non-distended, normally active bowel sounds. No rebound tenderness or guarding  Neuro: Neurologically intact grossly, no asterixis   Skin: warm, dry, no rashes are noted      LABORATORY WORK  Recent Labs   Lab Test 09/15/22  1027 12/03/21  0746 06/28/21  0914 06/17/21  1214 06/05/21  0939   WBC 6.2  --   --  15.6* 6.1   HGB 7.5*  --   --  12.7* 11.2*   MCV 76*  --   --  90 90   *  --   --  174 163   INR 1.34* 1.1 1.1 1.30* 1.28*     Recent Labs   Lab Test 09/15/22  1027 06/17/21  1214 06/05/21  0939    135 137   POTASSIUM 4.4 3.8 3.8   CHLORIDE 103 105 109   CO2 24 23 21   BUN 19.4 15 11   CR 0.95 1.06 1.05   ANIONGAP 10 7 7   DEXTER 10.8* 8.9 8.8     Recent Labs   Lab Test 09/15/22  1027 06/17/21  1507 06/17/21  1214 06/05/21  0939 05/19/21  0640 05/16/21  1319 05/16/21  1234   ALBUMIN 3.8  --  2.7* 2.6*   < >  --  2.6*   BILITOTAL 1.6*  --  3.3* 1.7*   < >  --  1.9*   DBIL 0.58*  --   --   --   --   --  1.2*   ALT 97*  --  36 49   < >  --  67   *  --  40 59*   < >  --  67*   ALKPHOS 102  --  108 129   < >  --  123   PROTEIN  --   Negative  --   --   --  Negative  --    LIPASE  --   --   --   --   --   --  517*    < > = values in this interval not displayed.       ENDOSCOPIC WORKUP  PAST HOSPITALIZATION:  EGD 5/17/21 indication: melena (Zamora):  - Grade I esophageal varices.                             - Normal stomach.                             - Normal examined duodenum.                             - No specimens collected.     Colonoscopy 5/18/21 indication: melena (Zamora):  - Two 4 mm polyps in the ascending colon, removed                             with a cold snare. Resected and retrieved.                             - One 5 mm polyp in the transverse colon, removed                             with a cold snare. Resected and retrieved.                             - One 6 mm polyp in the rectum, removed with a cold                             snare. Resected and retrieved.                             - Internal hemorrhoids.     CONSULTATION ASSESSMENT AND PLAN  Yakov Kahn is a 61 year old male with PMH including hypertension, etoh cirrhosis, history of anemia presents to [facility] ED with melena this morning. Hemoglobin in the ED at 7.5, , ALT 97, tbili 1.6, alk phos 102. Had similar episode in May 2021 with EGD showing grade 1 varices, otherwise unremarkable, colonoscopy and pill camera also unremarkable.     1. Melena. Hemoglobin 7.5, history of cirrhosis. Need to consider variceal bleeding given history. Could also consider PUD, esophagitis, AVMs, etc. Less likely lower GI bleeding.     -- Keep NPO.  -- EGD today.  -- Further recommendations to follow EGD.     I will discuss the patient plan with Dr. Pace. Thank you for asking us to participate in the care of this patient.    Melly Elizabeth PA-C  [facility]  Cell: [phone] until 12PM  Office: [phone]      Physician Attestation    I, Conrado Pace, saw and evaluated Yakov Kahn and I have reviewed and discussed with the advanced  "practice provider their history, physical and plan.    I personally reviewed the vital signs, medications, labs and imaging.  Total time spent with patient reviewing chart and evaluating patient, 10 minutes.    Exam:  Vital signs:  Temp: 97.8  F (36.6  C) Temp src: Temporal BP: (!) 140/76 Pulse: 99   Resp: 21 SpO2: 100 % O2 Device: None (Room air)   Height: 172.7 cm (5' 8\") Weight: 90.5 kg (199 lb 9.6 oz)  Estimated body mass index is 30.35 kg/m  as calculated from the following:    Height as of this encounter: 1.727 m (5' 8\").    Weight as of this encounter: 90.5 kg (199 lb 9.6 oz).    Impression:  -61-year-old gentleman with history of EtOH cirrhosis, abstinent for a year with a history of recurrent anemia admitted with melena.  Hemoglobin today 7.5.  No hematemesis.  Had similar episode in May 2021 which showed grade 1 varices otherwise normal and a colonoscopy that showed polyps and an unremarkable PillCam.  Differential diagnosis includes esophageal varices with bleeding, gastric varices with bleeding, portal hypertension with bleeding, not variceal upper GI causes cannot be excluded including esophagitis, gastritis, duodenitis or peptic ulcer disease.  Small bowel lesions cannot be excluded although PillCam last year was normal.  Colonic sources unlikely.  Hemodynamics are stable.  -Stable medical problems otherwise under the care of the patient's admitting team.    Recommendations:  -EGD today for further evaluation of blood loss and possible therapeutic intervention.  -Continue present management otherwise for now.  -Blood transfusions, fluids and supportive care per admitting team.  -Continue n.p.o. status.  -Monitor hemoglobin hematocrit.  -Please call with any further questions.    Thank you for allowing me to participate in this patient's healthcare. Please call any further questions.    Conrado Pace MD, [facility]  [facility]  [phone]          "

## 2022-09-15 NOTE — TELEPHONE ENCOUNTER
Patient had VRT visit with ARYAN today. Informed patient provider recommended [facility] or [place] site.    Patient went to [facility].    Informed ARYAN.    Veronica Davis RN

## 2022-09-15 NOTE — PROGRESS NOTES
Arrived to room 625 from ER at 1830 via cart, alert and oriented x 4, oriented to room and call system, IV patent and infusing, denies pain, reviewed welcome folder and pain/medication information packet with patient. Admission profile completed.

## 2022-09-15 NOTE — ED TRIAGE NOTES
Queezy stomach and fatigue sine yesterday. Today having formed black stools. Hx of anemia last year with hgb as low as 3. Source was not found previously. VSS on RA. Mildly jaundice appearing.

## 2022-09-15 NOTE — ED NOTES
[facility]  ED Nurse Handoff Report    Yakov Kahn is a 61 year old male   ED Chief complaint: Melena and Fatigue  . ED Diagnosis:   Final diagnoses:   Melena   Anemia, unspecified type     Allergies: No Known Allergies    Code Status: Full Code  Activity level - Baseline/Home:  Independent. Activity Level - Current:   Stand by Assist. Lift room needed: No. Bariatric: No   Needed: No   Isolation: No. Infection: Not Applicable.     Vital Signs:   Vitals:    09/15/22 1342 09/15/22 1350 09/15/22 1400 09/15/22 1410   BP: 116/71 116/70 112/71 114/72   Pulse: 102 98 96 95   Resp: 14 16 16 16   Temp:       TempSrc:       SpO2: 95% 94% 93% 93%   Weight:       Height:           Cardiac Rhythm:  ,      Pain level:    Patient confused: No. Patient Falls Risk: Yes.   Elimination Status: Has voided   Patient Report - Initial Complaint: rectal bleeding. Focused Assessment: Pt with dark stools this morning and increased generalized weakness, hx of low hemoglobin.    Tests Performed: Endo, labs. Abnormal Results: positive occult, low hemoglobin.   Treatments provided: Oral protonix, IV octreotide, blood product.   Family Comments: Wife planning to come back this afternoon.  OBS brochure/video discussed/provided to patient:  N/A  ED Medications:   Medications   flumazenil (ROMAZICON) injection 0.2 mg (has no administration in time range)   octreotide (sandoSTATIN) 1,250 mcg in D5W 250 mL (50 mcg/hr Intravenous New Bag 9/15/22 1430)   pantoprazole (PROTONIX) IV push injection 40 mg (has no administration in time range)   0.9% sodium chloride BOLUS (0 mLs Intravenous Stopped 9/15/22 1142)   pantoprazole (PROTONIX) EC tablet 40 mg (40 mg Oral Given 9/15/22 1157)   benzocaine 20% (HURRICAINE/TOPEX) 20 % spray 0.5 mL (0.5 mLs Mouth/Throat Given 9/15/22 1323)   diphenhydrAMINE (BENADRYL) injection 25-50 mg (25 mg Intravenous Given 9/15/22 1323)   octreotide (sandoSTATIN) injection 50 mcg (50 mcg Intravenous  Given 9/15/22 3837)     Drips infusing:  Yes  For the majority of the shift, the patient's behavior Green. Interventions performed were N/A.    Sepsis treatment initiated: No     Patient tested for COVID 19 prior to admission: YES    ED Nurse Name/Phone Number: Della Malcolm RN,   2:39 PM    RECEIVING UNIT ED HANDOFF REVIEW    Above ED Nurse Handoff Report was reviewed: Yes  Reviewed by: Oxana Mittal on September 15, 2022 at 6:10 PM

## 2022-09-16 ENCOUNTER — APPOINTMENT (OUTPATIENT)
Dept: ULTRASOUND IMAGING | Facility: CLINIC | Age: 62
DRG: 432 | End: 2022-09-16
Attending: INTERNAL MEDICINE
Payer: COMMERCIAL

## 2022-09-16 LAB
ALBUMIN SERPL BCG-MCNC: 3.1 G/DL (ref 3.5–5.2)
ALP SERPL-CCNC: 79 U/L (ref 40–129)
ALT SERPL W P-5'-P-CCNC: 102 U/L (ref 10–50)
ANION GAP SERPL CALCULATED.3IONS-SCNC: 8 MMOL/L (ref 7–15)
AST SERPL W P-5'-P-CCNC: 144 U/L (ref 10–50)
BASOPHILS # BLD AUTO: 0 10E3/UL (ref 0–0.2)
BASOPHILS NFR BLD AUTO: 1 %
BILIRUB SERPL-MCNC: 1.9 MG/DL
BUN SERPL-MCNC: 20.1 MG/DL (ref 8–23)
CALCIUM SERPL-MCNC: 8.6 MG/DL (ref 8.8–10.2)
CHLORIDE SERPL-SCNC: 105 MMOL/L (ref 98–107)
CREAT SERPL-MCNC: 1.04 MG/DL (ref 0.67–1.17)
DEPRECATED HCO3 PLAS-SCNC: 24 MMOL/L (ref 22–29)
EOSINOPHIL # BLD AUTO: 0.1 10E3/UL (ref 0–0.7)
EOSINOPHIL NFR BLD AUTO: 2 %
ERYTHROCYTE [DISTWIDTH] IN BLOOD BY AUTOMATED COUNT: 20.4 % (ref 10–15)
GFR SERPL CREATININE-BSD FRML MDRD: 82 ML/MIN/1.73M2
GLUCOSE SERPL-MCNC: 138 MG/DL (ref 70–99)
HCT VFR BLD AUTO: 28 % (ref 40–53)
HGB BLD-MCNC: 8.1 G/DL (ref 13.3–17.7)
HGB BLD-MCNC: 8.3 G/DL (ref 13.3–17.7)
HGB BLD-MCNC: 8.3 G/DL (ref 13.3–17.7)
HGB BLD-MCNC: 8.6 G/DL (ref 13.3–17.7)
IMM GRANULOCYTES # BLD: 0 10E3/UL
IMM GRANULOCYTES NFR BLD: 0 %
LYMPHOCYTES # BLD AUTO: 0.9 10E3/UL (ref 0.8–5.3)
LYMPHOCYTES NFR BLD AUTO: 17 %
MCH RBC QN AUTO: 23 PG (ref 26.5–33)
MCHC RBC AUTO-ENTMCNC: 29.6 G/DL (ref 31.5–36.5)
MCV RBC AUTO: 78 FL (ref 78–100)
MONOCYTES # BLD AUTO: 0.7 10E3/UL (ref 0–1.3)
MONOCYTES NFR BLD AUTO: 14 %
NEUTROPHILS # BLD AUTO: 3.4 10E3/UL (ref 1.6–8.3)
NEUTROPHILS NFR BLD AUTO: 66 %
NRBC # BLD AUTO: 0 10E3/UL
NRBC BLD AUTO-RTO: 0 /100
PLATELET # BLD AUTO: 94 10E3/UL (ref 150–450)
POTASSIUM SERPL-SCNC: 4.2 MMOL/L (ref 3.4–5.3)
PROT SERPL-MCNC: 5.7 G/DL (ref 6.4–8.3)
RBC # BLD AUTO: 3.61 10E6/UL (ref 4.4–5.9)
SODIUM SERPL-SCNC: 137 MMOL/L (ref 136–145)
UPPER GI ENDOSCOPY: NORMAL
WBC # BLD AUTO: 5.1 10E3/UL (ref 4–11)

## 2022-09-16 PROCEDURE — 80053 COMPREHEN METABOLIC PANEL: CPT | Performed by: PHYSICIAN ASSISTANT

## 2022-09-16 PROCEDURE — 85018 HEMOGLOBIN: CPT | Performed by: INTERNAL MEDICINE

## 2022-09-16 PROCEDURE — 06L38CZ OCCLUSION OF ESOPHAGEAL VEIN WITH EXTRALUMINAL DEVICE, VIA NATURAL OR ARTIFICIAL OPENING ENDOSCOPIC: ICD-10-PCS | Performed by: INTERNAL MEDICINE

## 2022-09-16 PROCEDURE — 120N000001 HC R&B MED SURG/OB

## 2022-09-16 PROCEDURE — C9113 INJ PANTOPRAZOLE SODIUM, VIA: HCPCS | Performed by: INTERNAL MEDICINE

## 2022-09-16 PROCEDURE — G0500 MOD SEDAT ENDO SERVICE >5YRS: HCPCS | Performed by: INTERNAL MEDICINE

## 2022-09-16 PROCEDURE — 250N000009 HC RX 250: Performed by: INTERNAL MEDICINE

## 2022-09-16 PROCEDURE — 36415 COLL VENOUS BLD VENIPUNCTURE: CPT | Performed by: INTERNAL MEDICINE

## 2022-09-16 PROCEDURE — 99233 SBSQ HOSP IP/OBS HIGH 50: CPT | Performed by: INTERNAL MEDICINE

## 2022-09-16 PROCEDURE — 36415 COLL VENOUS BLD VENIPUNCTURE: CPT | Performed by: PHYSICIAN ASSISTANT

## 2022-09-16 PROCEDURE — 43244 EGD VARICES LIGATION: CPT | Performed by: INTERNAL MEDICINE

## 2022-09-16 PROCEDURE — 85018 HEMOGLOBIN: CPT | Performed by: PHYSICIAN ASSISTANT

## 2022-09-16 PROCEDURE — 258N000003 HC RX IP 258 OP 636: Performed by: INTERNAL MEDICINE

## 2022-09-16 PROCEDURE — 250N000011 HC RX IP 250 OP 636: Performed by: INTERNAL MEDICINE

## 2022-09-16 PROCEDURE — 76705 ECHO EXAM OF ABDOMEN: CPT

## 2022-09-16 RX ORDER — DIPHENHYDRAMINE HYDROCHLORIDE 50 MG/ML
25-50 INJECTION INTRAMUSCULAR; INTRAVENOUS
Status: DISCONTINUED | OUTPATIENT
Start: 2022-09-16 | End: 2022-09-16 | Stop reason: HOSPADM

## 2022-09-16 RX ORDER — LIDOCAINE 40 MG/G
CREAM TOPICAL
Status: DISCONTINUED | OUTPATIENT
Start: 2022-09-16 | End: 2022-09-16 | Stop reason: HOSPADM

## 2022-09-16 RX ORDER — CETIRIZINE HYDROCHLORIDE 10 MG/1
10 TABLET ORAL DAILY PRN
Status: DISCONTINUED | OUTPATIENT
Start: 2022-09-16 | End: 2022-09-19 | Stop reason: HOSPADM

## 2022-09-16 RX ORDER — FLUMAZENIL 0.1 MG/ML
0.2 INJECTION, SOLUTION INTRAVENOUS
Status: DISCONTINUED | OUTPATIENT
Start: 2022-09-16 | End: 2022-09-16 | Stop reason: HOSPADM

## 2022-09-16 RX ORDER — EPINEPHRINE 1 MG/ML
0.1 INJECTION, SOLUTION, CONCENTRATE INTRAVENOUS
Status: DISCONTINUED | OUTPATIENT
Start: 2022-09-16 | End: 2022-09-16 | Stop reason: HOSPADM

## 2022-09-16 RX ORDER — NALOXONE HYDROCHLORIDE 0.4 MG/ML
0.2 INJECTION, SOLUTION INTRAMUSCULAR; INTRAVENOUS; SUBCUTANEOUS
Status: DISCONTINUED | OUTPATIENT
Start: 2022-09-16 | End: 2022-09-16 | Stop reason: HOSPADM

## 2022-09-16 RX ORDER — SIMETHICONE 40MG/0.6ML
133 SUSPENSION, DROPS(FINAL DOSAGE FORM)(ML) ORAL
Status: DISCONTINUED | OUTPATIENT
Start: 2022-09-16 | End: 2022-09-16 | Stop reason: HOSPADM

## 2022-09-16 RX ORDER — SODIUM CHLORIDE 9 MG/ML
INJECTION, SOLUTION INTRAVENOUS CONTINUOUS
Status: DISCONTINUED | OUTPATIENT
Start: 2022-09-16 | End: 2022-09-19

## 2022-09-16 RX ORDER — FLUMAZENIL 0.1 MG/ML
0.2 INJECTION, SOLUTION INTRAVENOUS
Status: ACTIVE | OUTPATIENT
Start: 2022-09-16 | End: 2022-09-17

## 2022-09-16 RX ORDER — CEFTRIAXONE 1 G/1
1 INJECTION, POWDER, FOR SOLUTION INTRAMUSCULAR; INTRAVENOUS EVERY 24 HOURS
Status: DISCONTINUED | OUTPATIENT
Start: 2022-09-16 | End: 2022-09-19 | Stop reason: HOSPADM

## 2022-09-16 RX ORDER — ATROPINE SULFATE 0.1 MG/ML
1 INJECTION INTRAVENOUS
Status: DISCONTINUED | OUTPATIENT
Start: 2022-09-16 | End: 2022-09-16 | Stop reason: HOSPADM

## 2022-09-16 RX ORDER — NALOXONE HYDROCHLORIDE 0.4 MG/ML
0.4 INJECTION, SOLUTION INTRAMUSCULAR; INTRAVENOUS; SUBCUTANEOUS
Status: DISCONTINUED | OUTPATIENT
Start: 2022-09-16 | End: 2022-09-16 | Stop reason: HOSPADM

## 2022-09-16 RX ORDER — FENTANYL CITRATE 50 UG/ML
50-100 INJECTION, SOLUTION INTRAMUSCULAR; INTRAVENOUS EVERY 5 MIN PRN
Status: DISCONTINUED | OUTPATIENT
Start: 2022-09-16 | End: 2022-09-16 | Stop reason: HOSPADM

## 2022-09-16 RX ADMIN — TOPICAL ANESTHETIC 0.5 ML: 200 SPRAY DENTAL; PERIODONTAL at 13:51

## 2022-09-16 RX ADMIN — PANTOPRAZOLE SODIUM 40 MG: 40 INJECTION, POWDER, FOR SOLUTION INTRAVENOUS at 20:45

## 2022-09-16 RX ADMIN — SODIUM CHLORIDE: 9 INJECTION, SOLUTION INTRAVENOUS at 17:14

## 2022-09-16 RX ADMIN — OCTREOTIDE ACETATE 50 MCG/HR: 200 INJECTION, SOLUTION INTRAVENOUS; SUBCUTANEOUS at 19:01

## 2022-09-16 RX ADMIN — FENTANYL CITRATE 100 MCG: 50 INJECTION INTRAMUSCULAR; INTRAVENOUS at 13:52

## 2022-09-16 RX ADMIN — MIDAZOLAM HYDROCHLORIDE 2 MG: 1 INJECTION, SOLUTION INTRAMUSCULAR; INTRAVENOUS at 13:53

## 2022-09-16 RX ADMIN — MIDAZOLAM HYDROCHLORIDE 1 MG: 1 INJECTION, SOLUTION INTRAMUSCULAR; INTRAVENOUS at 13:59

## 2022-09-16 RX ADMIN — PANTOPRAZOLE SODIUM 40 MG: 40 INJECTION, POWDER, FOR SOLUTION INTRAVENOUS at 09:09

## 2022-09-16 RX ADMIN — MIDAZOLAM HYDROCHLORIDE 1 MG: 1 INJECTION, SOLUTION INTRAMUSCULAR; INTRAVENOUS at 14:02

## 2022-09-16 RX ADMIN — CEFTRIAXONE 1 G: 1 INJECTION, POWDER, FOR SOLUTION INTRAMUSCULAR; INTRAVENOUS at 17:14

## 2022-09-16 ASSESSMENT — ACTIVITIES OF DAILY LIVING (ADL)
ADLS_ACUITY_SCORE: 24

## 2022-09-16 NOTE — PROGRESS NOTES
[facility]  Pre-Endoscopy History and Physical     Yakov Kahn MRN# [id number]   YOB: 1960 Age: 61 year old   Today's Date: 09/16/2022    Date of Procedure: 9/15/2022  Primary care provider: Humberto Vora  Type of Endoscopy: esophagogastroduodenoscopy (upper GI endoscopy)  Reason for Procedure: Melena  Type of Anesthesia Anticipated: Moderate (conscious) sedation    HPI:    Yakov is a 61 year old male who will be undergoing the above procedure.      A history and physical has been performed. The patient's medications and allergies have been reviewed. The risks and benefits of the procedure and the sedation options and risks were discussed with the patient.  All questions were answered and informed consent was obtained.      No Known Allergies     Current Facility-Administered Medications   Medication     0.9% sodium chloride BOLUS     acetaminophen (TYLENOL) tablet 325 mg    Or     acetaminophen (TYLENOL) Suppository 325 mg     atropine injection 1 mg     benzocaine 20% (HURRICAINE/TOPEX) 20 % spray 0.5 mL     diphenhydrAMINE (BENADRYL) injection 25-50 mg     EPINEPHrine PF (ADRENALIN) injection 0.1 mg     fentaNYL (PF) (SUBLIMAZE) injection  mcg     flumazenil (ROMAZICON) injection 0.2 mg     glucagon injection 0.5 mg     lidocaine (LMX4) cream     lidocaine 1 % 0.1-1 mL     melatonin tablet 1 mg     midazolam (VERSED) injection 0.5-2 mg     naloxone (NARCAN) injection 0.2 mg    Or     naloxone (NARCAN) injection 0.4 mg    Or     naloxone (NARCAN) injection 0.2 mg    Or     naloxone (NARCAN) injection 0.4 mg     octreotide (sandoSTATIN) 1,250 mcg in D5W 250 mL     ondansetron (ZOFRAN ODT) ODT tab 4 mg    Or     ondansetron (ZOFRAN) injection 4 mg     pantoprazole (PROTONIX) IV push injection 40 mg     polyethylene glycol (MIRALAX) Packet 17 g     senna-docusate (SENOKOT-S/PERICOLACE) 8.6-50 MG per tablet 1 tablet    Or     senna-docusate (SENOKOT-S/PERICOLACE) 8.6-50  "MG per tablet 2 tablet     simethicone (MYLICON) suspension 133 mg     sodium chloride (PF) 0.9% PF flush 3 mL     sodium chloride (PF) 0.9% PF flush 3 mL     sodium chloride (PF) 0.9% PF flush 3 mL       Patient Active Problem List   Diagnosis     Plantar fascial fibromatosis     CARDIOVASCULAR SCREENING; LDL GOAL LESS THAN 160     Essential hypertension     Upper GI bleed     Anemia due to blood loss, acute     Alcoholic cirrhosis, unspecified whether ascites present (H)     Clostridium difficile diarrhea     Melena     Anemia, unspecified type        Past Medical History:   Diagnosis Date     Alcohol abuse      Benign essential hypertension      Cirrhosis of liver - by imaging, suspect EtOH related      Esophageal varices      h/o Upper GI bleed 05/2021    Admitted hgb of 3.0.   5 units PRBC transfused.     Iron deficiency anemia due to chronic blood loss         Past Surgical History:   Procedure Laterality Date     ESOPHAGOSCOPY, GASTROSCOPY, DUODENOSCOPY (EGD), COMBINED Left 5/17/2021    Procedure: ESOPHAGOGASTRODUODENOSCOPY (EGD);  Surgeon: Rey Zamora MD;  Location:  GI     ESOPHAGOSCOPY, GASTROSCOPY, DUODENOSCOPY (EGD), COMBINED N/A 9/15/2022    Procedure: ESOPHAGOGASTRODUODENOSCOPY (EGD);  Surgeon: Conrado Pace MD;  Location:  GI       Social History     Tobacco Use     Smoking status: Never Smoker     Smokeless tobacco: Former User   Substance Use Topics     Alcohol use: Yes     Comment: Monthly on special occasions       Family History   Problem Relation Age of Onset     Hypertension Brother      Diabetes No family hx of      Other Cancer No family hx of          PHYSICAL EXAM:   /63 (BP Location: Left arm)   Pulse 74   Temp 98.1  F (36.7  C) (Temporal)   Resp 20   Ht 1.727 m (5' 8\")   Wt 92.4 kg (203 lb 12.8 oz)   SpO2 100%   BMI 30.99 kg/m   Estimated body mass index is 30.99 kg/m  as calculated from the following:    Height as of this encounter: 1.727 m (5' " "8\").    Weight as of this encounter: 92.4 kg (203 lb 12.8 oz).   RESP: lungs clear to auscultation - no rales, rhonchi or wheezes  CV: regular rates and rhythm    IMPRESSION   ASA Class 3 - Severe systemic disease, but not incapacitating  Mallampati Score: 2      Conrado Pace MD                "

## 2022-09-16 NOTE — PROGRESS NOTES
[facility]  Hospitalist Progress Note  Hemanth Mark MD 09/16/2022    Reason for Stay (Diagnosis): GIB         Assessment and Plan:      Summary of Stay: Yakov Kahn is a 61 year old male with PMHx significant for alcoholic cirrhosis, HTN, hx of upper GI bleed of unclear source despite extensive evaluation, hx of grade I esophageal varices and hx of c diff, who was admitted on 9/15/2022 with melena and concerns for upper GI bleeding.     EGD was performed on the day of admit and complicated by clotted blood and stomach contents making visualization difficult.  Varices were identified but there was no active bleeding.  Plan is to repeat EGD on 9/16 with variceal banding.      The patient has received 2 units PRBC since admit.  He has remained hemodynamically stable.  He is receiving IV PPI and octreotide gtt     1. Acute blood loss anemia due to UGIB  - EGD on admit showed no active hemorrhage.  Varices identified but scope complicated by poor visualization due to stomach contents and clotted blood.  - repeat EGD planned on 9/16 with variceal banding  - continue octreotide gtt  - continue IV PPI  - transfuse for hgb<8   - repeat hgb this evening and again in AM    Addendum:  EGD done today shows esophageal varices (banded x 4) and clotted blood in stomach with concern about the potential for a gastric varix underlying this clot (unable to see what was underneath clot during EGD).  No active bleeding at time of EGD.  Case d/w Dr Pace today - continue IV PPI, IV octreotide, and keep NPO for now.  He anticipates pt to remain in house through the weekend and would consider repeat EGD prior to discharge to better eval stomach and rule out gastric varix given persistent clot in stomach on scope today.  If pt shows signs of re-bleed would tx to [facility] or [facility] for consideration of TIPS.  ppx Rocephin ordered in setting of UGIB and cirrhosis.  US with doppler ordered.  See EGD report for  "details     2. HTN  Not on medications, monitor    3.  Hx of ETOH cirrhosis  - last use of ETOH was 2 weeks ago over Labor day        Diet:   NPO  DVT Prophylaxis: Low Risk/Ambulatory with no VTE prophylaxis indicated  Ugarte Catheter: Not present  Central Lines: None  Cardiac Monitoring: None  Code Status:   full code  DISPO:  Per GI.  Await results of EGD today        Interval History (Subjective):      No BM since admit.  Has received 2 units PRBC so far.  No abd pain.  No fever.  No complaints today.  Anticipates EGD today                  Physical Exam:      Last Vital Signs:  /63 (BP Location: Left arm)   Pulse 74   Temp 98.1  F (36.7  C) (Temporal)   Resp 20   Ht 1.727 m (5' 8\")   Wt 92.4 kg (203 lb 12.8 oz)   SpO2 100%   BMI 30.99 kg/m        Intake/Output Summary (Last 24 hours) at 9/16/2022 1348  Last data filed at 9/16/2022 0033  Gross per 24 hour   Intake 350 ml   Output --   Net 350 ml       Constitutional: Awake, alert, cooperative, no apparent distress   Respiratory: Clear to auscultation bilaterally, no crackles or wheezing   Cardiovascular: Regular rate and rhythm, normal S1 and S2, and no murmur noted   Abdomen: Normal bowel sounds, soft, non-distended, non-tender   Skin: No rashes, no cyanosis, dry to touch   Neuro: Alert and oriented x3, no weakness, numbness, memory loss   Extremities: No edema, normal range of motion   Other(s):        All other systems: Negative          Medications:      All current medications were reviewed with changes reflected in problem list.         Data:      All new lab and imaging data was reviewed.   Labs:  Recent Labs   Lab 09/16/22  0610 09/15/22  1027    137   POTASSIUM 4.2 4.4   CHLORIDE 105 103   CO2 24 24   ANIONGAP 8 10   * 144*   BUN 20.1 19.4   CR 1.04 0.95   GFRESTIMATED 82 >90   DEXTER 8.6* 10.8*     Recent Labs   Lab 09/16/22  0610   WBC 5.1   HGB 8.3*   HCT 28.0*   MCV 78   PLT 94*      Imaging:   No results found for this or any " previous visit (from the past 24 hour(s)).

## 2022-09-16 NOTE — UTILIZATION REVIEW
Admission Status; Secondary Review Determination       Under the authority of the Utilization Management Committee, the utilization review process indicated a secondary review on the above patient. The review outcome is based on review of the medical records, discussions with staff, and applying clinical experience noted on the date of the review.     (x) Inpatient Status Appropriate - This patient's medical care is consistent with medical management for inpatient care and reasonable inpatient medical practice.     RATIONALE FOR DETERMINATION   60 yo man with cirrhosis, known varices who presented with melena.  Hemoglobin 7.5 on presentation. Has been transfused 2 units PRBCs. Initiated on octreotide infusion and IV PPI. Gastroenterology consultation. On IV ceftriaxone for GI bleed with cirrhosis. Brought for EGD this afternoon and had 4 bands placed on variceal bleeds. Anticipate he will need another night in the hospital.    At the time of admission with the information available to the attending physician more than 2 nights hospital complex care was anticipated, based on patient risk of adverse outcome if treated as outpatient and complex care required. Inpatient admission is appropriate based on the Medicare guidelines.     This document was produced using voice recognition software.    The information on this document is developed by the utilization review team in order for the business office to ensure compliance. This only denotes the appropriateness of proper admission status and does not reflect the quality of care rendered.   The definitions of Inpatient Status and Observation Status used in making the determination above are those provided in the CMS Coverage Manual, Chapter 1 and Chapter 6, section 70.4.     Sincerely,   Zari Martinez MD  Utilization Review  Physician Advisor  [facility].

## 2022-09-16 NOTE — PLAN OF CARE
Goal Outcome Evaluation:    Plan of Care Reviewed With: patient, spouse     Overall Patient Progress: improving     Pt A&O x4. VS stable; afebrile. Denies pain. CMS intact. Up independently in room. Voiding in good amts. Pt did report 1 black stool today. Hgb 8.6. Remains NPO. Octreotide drip infusing @ 10 ml/hr.

## 2022-09-16 NOTE — OR NURSING
EGD with esophageal band ligation was done by MD Pace under seation. 4 bands were applied on esophageal varices. Tolerated procedure well and no pain or no bleeding post procedure. Vital signs were stable during procedure with 2L/hr of O2. Report was given to the chung nurse

## 2022-09-16 NOTE — CARE PLAN
Pt is A&O x4. VSS. Denies pain. On NPO except for medications. On RA. Voids to the bathroom. Ambulates assist x1 with GB and walker. Completed 2nd 1U of blood at the beginning of shift. Well tolarated. Hgb re-check 8.1. CMS intact. Discharge plan pending.

## 2022-09-16 NOTE — PROGRESS NOTES
Care Management Discharge Note    Discharge Date: 09/17/2022       Discharge Disposition: Home    Discharge Services:      Discharge DME:      Discharge Transportation:      Private pay costs discussed: Not applicable    PAS Confirmation Code:    Patient/family educated on Medicare website which has current facility and service quality ratings:      Education Provided on the Discharge Plan:    Persons Notified of Discharge Plans:   Patient/Family in Agreement with the Plan:      Handoff Referral Completed: yes    Additional Information:  Pt identified as a Service Bundle #3. No needs or assessment needed at this time. Please consult CM/SW  if discharge needs should arise.    AYE Adames, [facility]  Inpatient Care Coordination  Ortho/Spine Unit  [phone]  Neha Prescott, [place]

## 2022-09-16 NOTE — PLAN OF CARE
Goal Outcome Evaluation:    Patient A&Ox4. VS stable, denied pain. Is NPO. Needs stand by assist. Voiding adequately. Hgb recheck 7.7 so patient is receiving a unit of blood. Hemoglobin recheck will be rescheduled because blood transfusion was late. Octreotide infusing at 10ml/hr. Will continue to provide supportive care.

## 2022-09-17 LAB
ALBUMIN SERPL BCG-MCNC: 3.1 G/DL (ref 3.5–5.2)
ALP SERPL-CCNC: 82 U/L (ref 40–129)
ALT SERPL W P-5'-P-CCNC: 122 U/L (ref 10–50)
ANION GAP SERPL CALCULATED.3IONS-SCNC: 9 MMOL/L (ref 7–15)
ANION GAP SERPL CALCULATED.3IONS-SCNC: 9 MMOL/L (ref 7–15)
AST SERPL W P-5'-P-CCNC: 159 U/L (ref 10–50)
BASOPHILS # BLD AUTO: 0 10E3/UL (ref 0–0.2)
BASOPHILS NFR BLD AUTO: 1 %
BILIRUB SERPL-MCNC: 1.7 MG/DL
BUN SERPL-MCNC: 15.4 MG/DL (ref 8–23)
BUN SERPL-MCNC: 17.1 MG/DL (ref 8–23)
CALCIUM SERPL-MCNC: 8.3 MG/DL (ref 8.8–10.2)
CALCIUM SERPL-MCNC: 8.4 MG/DL (ref 8.8–10.2)
CHLORIDE SERPL-SCNC: 106 MMOL/L (ref 98–107)
CHLORIDE SERPL-SCNC: 106 MMOL/L (ref 98–107)
CREAT SERPL-MCNC: 0.89 MG/DL (ref 0.67–1.17)
CREAT SERPL-MCNC: 0.93 MG/DL (ref 0.67–1.17)
DEPRECATED HCO3 PLAS-SCNC: 23 MMOL/L (ref 22–29)
DEPRECATED HCO3 PLAS-SCNC: 23 MMOL/L (ref 22–29)
EOSINOPHIL # BLD AUTO: 0.1 10E3/UL (ref 0–0.7)
EOSINOPHIL NFR BLD AUTO: 2 %
ERYTHROCYTE [DISTWIDTH] IN BLOOD BY AUTOMATED COUNT: 21.4 % (ref 10–15)
GFR SERPL CREATININE-BSD FRML MDRD: >90 ML/MIN/1.73M2
GFR SERPL CREATININE-BSD FRML MDRD: >90 ML/MIN/1.73M2
GLUCOSE SERPL-MCNC: 136 MG/DL (ref 70–99)
GLUCOSE SERPL-MCNC: 141 MG/DL (ref 70–99)
HCT VFR BLD AUTO: 28.7 % (ref 40–53)
HGB BLD-MCNC: 8.5 G/DL (ref 13.3–17.7)
IMM GRANULOCYTES # BLD: 0 10E3/UL
IMM GRANULOCYTES NFR BLD: 0 %
INR PPP: 1.34 (ref 0.85–1.15)
LYMPHOCYTES # BLD AUTO: 0.5 10E3/UL (ref 0.8–5.3)
LYMPHOCYTES NFR BLD AUTO: 11 %
MCH RBC QN AUTO: 23.4 PG (ref 26.5–33)
MCHC RBC AUTO-ENTMCNC: 29.6 G/DL (ref 31.5–36.5)
MCV RBC AUTO: 79 FL (ref 78–100)
MONOCYTES # BLD AUTO: 0.5 10E3/UL (ref 0–1.3)
MONOCYTES NFR BLD AUTO: 11 %
NEUTROPHILS # BLD AUTO: 3.6 10E3/UL (ref 1.6–8.3)
NEUTROPHILS NFR BLD AUTO: 75 %
NRBC # BLD AUTO: 0 10E3/UL
NRBC BLD AUTO-RTO: 0 /100
PLATELET # BLD AUTO: 93 10E3/UL (ref 150–450)
POTASSIUM SERPL-SCNC: 3.7 MMOL/L (ref 3.4–5.3)
POTASSIUM SERPL-SCNC: 4 MMOL/L (ref 3.4–5.3)
PROT SERPL-MCNC: 6.2 G/DL (ref 6.4–8.3)
RBC # BLD AUTO: 3.64 10E6/UL (ref 4.4–5.9)
SODIUM SERPL-SCNC: 138 MMOL/L (ref 136–145)
SODIUM SERPL-SCNC: 138 MMOL/L (ref 136–145)
WBC # BLD AUTO: 4.7 10E3/UL (ref 4–11)

## 2022-09-17 PROCEDURE — 120N000001 HC R&B MED SURG/OB

## 2022-09-17 PROCEDURE — 36415 COLL VENOUS BLD VENIPUNCTURE: CPT | Performed by: INTERNAL MEDICINE

## 2022-09-17 PROCEDURE — 258N000003 HC RX IP 258 OP 636: Performed by: INTERNAL MEDICINE

## 2022-09-17 PROCEDURE — 85014 HEMATOCRIT: CPT | Performed by: INTERNAL MEDICINE

## 2022-09-17 PROCEDURE — 99232 SBSQ HOSP IP/OBS MODERATE 35: CPT | Performed by: INTERNAL MEDICINE

## 2022-09-17 PROCEDURE — C9113 INJ PANTOPRAZOLE SODIUM, VIA: HCPCS | Performed by: INTERNAL MEDICINE

## 2022-09-17 PROCEDURE — 80053 COMPREHEN METABOLIC PANEL: CPT | Performed by: INTERNAL MEDICINE

## 2022-09-17 PROCEDURE — 250N000011 HC RX IP 250 OP 636: Performed by: INTERNAL MEDICINE

## 2022-09-17 PROCEDURE — 85610 PROTHROMBIN TIME: CPT | Performed by: INTERNAL MEDICINE

## 2022-09-17 RX ADMIN — PANTOPRAZOLE SODIUM 40 MG: 40 INJECTION, POWDER, FOR SOLUTION INTRAVENOUS at 07:59

## 2022-09-17 RX ADMIN — CEFTRIAXONE 1 G: 1 INJECTION, POWDER, FOR SOLUTION INTRAMUSCULAR; INTRAVENOUS at 16:05

## 2022-09-17 RX ADMIN — OCTREOTIDE ACETATE 50 MCG/HR: 200 INJECTION, SOLUTION INTRAVENOUS; SUBCUTANEOUS at 19:48

## 2022-09-17 RX ADMIN — SODIUM CHLORIDE: 9 INJECTION, SOLUTION INTRAVENOUS at 06:37

## 2022-09-17 RX ADMIN — PANTOPRAZOLE SODIUM 40 MG: 40 INJECTION, POWDER, FOR SOLUTION INTRAVENOUS at 21:08

## 2022-09-17 ASSESSMENT — ACTIVITIES OF DAILY LIVING (ADL)
ADLS_ACUITY_SCORE: 24

## 2022-09-17 NOTE — PROGRESS NOTES
[facility]  Hospitalist Progress Note  Emmanuel Perez MD 09/17/2022    Reason for Stay (Diagnosis): GIB         Assessment and Plan:      Summary of Stay: Yakov Kahn is a 61 year old male with PMHx significant for alcoholic cirrhosis, HTN, hx of upper GI bleed of unclear source despite extensive evaluation, hx of grade I esophageal varices and hx of c diff, who was admitted on 9/15/2022 with melena and concerns for upper GI bleeding.     EGD was performed on the day of admit and complicated by clotted blood and stomach contents making visualization difficult.  Varices were identified but there was no active bleeding.       The patient has received 2 units PRBC since admit and has since remained hemodynamically stable.  He is receiving IV PPI and octreotide gtt. Repeat EGD completed 9/16 showed grade 2 esophageal varices without evidence of recent bleeding.  These were banded.  Clotted blood was noted in the cardia and gastric fundus, limiting evaluation.  GI expressed concern for possible gastric varix.     1. Acute blood loss anemia due to UGIB, no ongoing bleeding appreciated at this time.  - EGD on admit showed no active hemorrhage.  Varices identified but scope complicated by poor visualization due to stomach contents and clotted blood.  - repeat EGD completed 9/16 with esophageal variceal banding.  Adherent clot in the stomach was not removed.  Plan for repeat EGD on Monday.  - continue octreotide gtt  - continue IV PPI  - transfuse for hgb<8, monitor hemoglobins    Per Dr. Pace:  Continue IV PPI, IV octreotide, and keep NPO for now.  He anticipates pt to remain in house through the weekend and would consider repeat EGD prior to discharge to better eval stomach and rule out gastric varix given persistent clot in stomach on scope today.  If pt shows signs of re-bleed would tx to [facility] or [facility] for consideration of TIPS.  ppx Rocephin ordered in setting of UGIB and cirrhosis.  US with doppler  "ordered.  See EGD report for details     2. HTN  Not on medications, monitor    3.  ETOH cirrhosis without documented ascites, alcoholic hepatitis, fixed coagulopathy  - last use of ETOH was 2 weeks ago over Labor day        Diet:   NPO  DVT Prophylaxis: Low Risk/Ambulatory with no VTE prophylaxis indicated  Ugarte Catheter: Not present  Central Lines: None  Cardiac Monitoring: None  Code Status:   full code  DISPO:  Per GI.  Await results of EGD today        Interval History (Subjective):      Chart reviewed, pt interviewed.    Mr. Kahn reports feeling substantially better today.    His wife was present at the bedside and I introduced discussion about alcohol abuse and treatment for that condition.  Patient seems to not have much insight.  He denies specific depression, anxiety and sleep disruption.  He has not previously been in alcohol treatment.                  Physical Exam:      Last Vital Signs:  /70 (BP Location: Left arm)   Pulse 74   Temp 98.7  F (37.1  C) (Temporal)   Resp 16   Ht 1.727 m (5' 8\")   Wt 92.4 kg (203 lb 12.8 oz)   SpO2 95%   BMI 30.99 kg/m        Intake/Output Summary (Last 24 hours) at 9/16/2022 1348  Last data filed at 9/16/2022 0033  Gross per 24 hour   Intake 350 ml   Output --   Net 350 ml       Constitutional: Awake, alert, cooperative, no apparent distress.  No evident jaundice.   Respiratory: Clear to auscultation bilaterally, no crackles or wheezing   Cardiovascular: Regular rate and rhythm, normal S1 and S2, and no murmur noted   Abdomen: Normal bowel sounds, soft, non-distended, non-tender   Skin: No rashes, no cyanosis, dry to touch   Neuro: Alert and oriented x3, no weakness, numbness, memory loss   Extremities: No edema, normal range of motion   Other(s):        All other systems: Negative          Medications:      All current medications were reviewed with changes reflected in problem list.         Data:      All new lab and imaging data was reviewed. "   Labs:  Recent Labs   Lab 09/17/22  0621 09/16/22  0610 09/15/22  1027    137 137   POTASSIUM 4.0 4.2 4.4   CHLORIDE 106 105 103   CO2 23 24 24   ANIONGAP 9 8 10   * 138* 144*   BUN 17.1 20.1 19.4   CR 0.93 1.04 0.95   GFRESTIMATED >90 82 >90   DEXTER 8.3* 8.6* 10.8*     Recent Labs   Lab 09/17/22 0621   WBC 4.7   HGB 8.5*   HCT 28.7*   MCV 79   PLT 93*      Imaging:   No results found for this or any previous visit (from the past 24 hour(s)).

## 2022-09-17 NOTE — PLAN OF CARE
Goal Outcome Evaluation:    Plan of Care Reviewed With: patient, spouse     Overall Patient Progress: improving    Pt A&O x4. VS stable; afebrile. Denies pain. CMS intact. Up independently in room. Voiding in good amts. Advanced to low fat diet-tolerating well. Plan is home @ discharge.

## 2022-09-17 NOTE — PLAN OF CARE
Goal Outcome Evaluation:    Plan of Care Reviewed With: patient     Overall Patient Progress: improving     Patient alert and oriented x4. VSS; afebrile. Kalyn pain. CMS intact. Up independent in room. Voiding good amounts. Pt reported small blood in emesis. Viewed emesis small amount of blood clots, no signs of active bleeding. Hgb @ 2347 was 8.3. Remains NPO with ice chips. Octreotide drip infusing @10 ml/hr. NS infusing @ 75 ml/hr. Discharge possibly Monday 9/19 (to home), want to keep this weekend for observation. Possibly doing another EDG before discharge to get a better look.

## 2022-09-18 LAB
ERYTHROCYTE [DISTWIDTH] IN BLOOD BY AUTOMATED COUNT: 22.5 % (ref 10–15)
HCT VFR BLD AUTO: 27.6 % (ref 40–53)
HGB BLD-MCNC: 8.4 G/DL (ref 13.3–17.7)
MCH RBC QN AUTO: 23.9 PG (ref 26.5–33)
MCHC RBC AUTO-ENTMCNC: 30.4 G/DL (ref 31.5–36.5)
MCV RBC AUTO: 78 FL (ref 78–100)
PLATELET # BLD AUTO: 103 10E3/UL (ref 150–450)
RBC # BLD AUTO: 3.52 10E6/UL (ref 4.4–5.9)
WBC # BLD AUTO: 4.9 10E3/UL (ref 4–11)

## 2022-09-18 PROCEDURE — 99232 SBSQ HOSP IP/OBS MODERATE 35: CPT | Performed by: INTERNAL MEDICINE

## 2022-09-18 PROCEDURE — C9113 INJ PANTOPRAZOLE SODIUM, VIA: HCPCS | Performed by: INTERNAL MEDICINE

## 2022-09-18 PROCEDURE — 85027 COMPLETE CBC AUTOMATED: CPT | Performed by: INTERNAL MEDICINE

## 2022-09-18 PROCEDURE — 36415 COLL VENOUS BLD VENIPUNCTURE: CPT | Performed by: INTERNAL MEDICINE

## 2022-09-18 PROCEDURE — 250N000011 HC RX IP 250 OP 636: Performed by: INTERNAL MEDICINE

## 2022-09-18 PROCEDURE — 120N000001 HC R&B MED SURG/OB

## 2022-09-18 RX ADMIN — PANTOPRAZOLE SODIUM 40 MG: 40 INJECTION, POWDER, FOR SOLUTION INTRAVENOUS at 08:52

## 2022-09-18 RX ADMIN — PANTOPRAZOLE SODIUM 40 MG: 40 INJECTION, POWDER, FOR SOLUTION INTRAVENOUS at 20:16

## 2022-09-18 RX ADMIN — CEFTRIAXONE 1 G: 1 INJECTION, POWDER, FOR SOLUTION INTRAMUSCULAR; INTRAVENOUS at 17:07

## 2022-09-18 ASSESSMENT — ACTIVITIES OF DAILY LIVING (ADL)
ADLS_ACUITY_SCORE: 24

## 2022-09-18 NOTE — PROGRESS NOTES
"    Gastroenterology Follow Up Note    Yakov Kahn MRN#  [id number]   Age:  61 year old YOB: 1960    Date of Admission:  9/15/2022     Subjective   No acute events overnight.  No bowel movements in past 24 hours.  Denies any abdominal pain, nausea, or emesis. Tolerating diet without any difficulties.  Seen with family at bedside.     MEDICATIONS & ALLERGIES   Current medication list reviewed.      No Known Allergies       OBJECTIVE   Vitals /68 (BP Location: Left arm)   Pulse 72   Temp 97.7  F (36.5  C) (Temporal)   Resp 16   Ht 1.727 m (5' 8\")   Wt 92.4 kg (203 lb 12.8 oz)   SpO2 96%   BMI 30.99 kg/m          General:   Well-developed male in NAD.   HEENT:   NC/AT. MMM.   Lungs:  No respiratory distress.   Heart:  RRR. +S1, S2.    Abdomen:   Soft. NT/ND. BS active.    Ext/MS:   No cyanosis or erythema.    Neuro:   No focal deficits noted.    Psych:  Appears to have normal affect and mood.   Skin:  No obvious rashes or lesions noted.         LABORATORY AND IMAGING    ELECTROLYTE PANEL   Recent Labs   Lab Test 09/17/22  1025 09/17/22  0621 09/16/22  0610   POTASSIUM 3.7 4.0 4.2   BUN 15.4 17.1 20.1      HEMATOLOGY PANEL   Recent Labs   Lab Test 09/17/22  0621 09/16/22  0610 09/15/22  1027 12/03/21  0746   WBC 4.7 5.1 6.2  --    MCV 79 78 76*  --    INR 1.34*  --  1.34* 1.1      LIVER AND PANCREAS PANEL   Recent Labs   Lab Test 09/17/22  0621 09/16/22  0610 09/15/22  1027 05/19/21  0640 05/16/21  1234   ALBUMIN 3.1* 3.1* 3.8   < > 2.6*   LIPASE  --   --   --   --  517*    < > = values in this interval not displayed.      I have reviewed the current diagnostic and laboratory tests.     Assessment / Recommendations:     Assessment:  1. Upper GI blood loss.  EGD on 09/16 with clotted blood in stomach with limited visualization.  Grade II esophageal varices with no bleeding or stigmata of recent bleeding.  Esophageal varices treated with band ligation x4.  Plan for a repeat EGD on Monday " given limited endoscopic exam and unclear source of bleeding.  2. Acute blood loss anemia.  Hemoglobin has remained stable over the weekend.  3. Liver cirrhosis, alcohol induced.  MELD-Na: 13.  No evidence of tense ascites or encephalopathy.  4. Alcohol abuse.    Recommendations:  1. Tentative plan for repeat EGD tomorrow, given limited endoscopic exam on Friday and unclear source of bleeding.  2. Ok to continue low fat diet today.  NPO after midnight.  3. Continue Pantoprazole IV BID and Octreotide gtt.  4. Monitor hemoglobin and transfuse as needed.  5. Transfer to [facility] or [facility] in the event of recurrent GI bleeding overnight for evaluation of TIPS vs BRTO.  6. Alcohol abstinence discussed and strongly recommended.  7. Further recommendations to follow endoscopic findings tomorrow.       Total time spent in chart review, direct medical discussion, examination, and documentation was 20 minutes.    Matthew Springer MD  [facility]  [phone]  I appreciate the opportunity to participate in the care of this patient.   Please feel free to call me with any questions or concerns.

## 2022-09-18 NOTE — PROGRESS NOTES
"    Gastroenterology Follow Up Note    Yakov Kahn MRN#  [id number]   Age:  61 year old YOB: 1960    Date of Admission:  9/15/2022     Subjective   No acute events overnight.  One bowel movement this morning with dark colored stools.  Denies any abdominal pain, nausea, or emesis.      MEDICATIONS & ALLERGIES   Current medication list reviewed.      No Known Allergies       OBJECTIVE   Vitals /67 (BP Location: Left arm)   Pulse 72   Temp 98  F (36.7  C) (Temporal)   Resp 16   Ht 1.727 m (5' 8\")   Wt 92.4 kg (203 lb 12.8 oz)   SpO2 95%   BMI 30.99 kg/m          General:   Well-developed male in NAD.   HEENT:   NC/AT. MMM.   Lungs:  No respiratory distress.   Heart:  RRR. +S1, S2.    Abdomen:   Soft. NT/ND. BS active.    Ext/MS:   No cyanosis or erythema.    Neuro:   No focal deficits noted.    Psych:  Appears to have normal affect and mood.   Skin:  No obvious rashes or lesions noted.         LABORATORY AND IMAGING    ELECTROLYTE PANEL   Recent Labs   Lab Test 09/17/22  1025 09/17/22  0621 09/16/22  0610   POTASSIUM 3.7 4.0 4.2   BUN 15.4 17.1 20.1      HEMATOLOGY PANEL   Recent Labs   Lab Test 09/17/22  0621 09/16/22  0610 09/15/22  1027 12/03/21  0746   WBC 4.7 5.1 6.2  --    MCV 79 78 76*  --    INR 1.34*  --  1.34* 1.1      LIVER AND PANCREAS PANEL   Recent Labs   Lab Test 09/17/22  0621 09/16/22  0610 09/15/22  1027 05/19/21  0640 05/16/21  1234   ALBUMIN 3.1* 3.1* 3.8   < > 2.6*   LIPASE  --   --   --   --  517*    < > = values in this interval not displayed.      I have reviewed the current diagnostic and laboratory tests.     Assessment / Recommendations:     Assessment:  1. Upper GI blood loss.  EGD on 09/16 with clotted blood in stomach with limited visualization.  Grade II esophageal varices with no bleeding or stigmata of recent bleeding.  Esophageal varices treated with band ligation x4.  Plan for a repeat EGD on Monday given limited endoscopic exam and unclear source of " bleeding.  2. Acute blood loss anemia.  Hemoglobin without any acute drop overnight.  3. Liver cirrhosis, alcohol induced.  MELD-Na: 13.  4. Alcohol abuse.    Recommendations:  1. Ok to resume clear liquid diet.  2. Continue Pantoprazole IV BID and Octreotide gtt.  3. Monitor hemoglobin and transfuse as needed.  4. Tentative plan for repeat EGD on Monday, given limited endoscopic exam on Friday and unclear source of bleeding.  5. Transfer to [facility] or [facility] in the event of recurrent GI bleeding for evaluation of TIPS vs BRTO.  6. Will continue to follow.       Total time spent in chart review, direct medical discussion, examination, and documentation was 20 minutes.    Matthew Springer MD  [facility]  [phone]  I appreciate the opportunity to participate in the care of this patient.   Please feel free to call me with any questions or concerns.

## 2022-09-18 NOTE — PROGRESS NOTES
[facility]  Hospitalist Progress Note  Emmanuel Perez MD 09/18/2022    Reason for Stay (Diagnosis): GIB         Assessment and Plan:      Summary of Stay: Yakov Kahn is a 61 year old male with PMHx significant for alcoholic cirrhosis, HTN, hx of upper GI bleed of unclear source despite extensive evaluation, hx of grade I esophageal varices and hx of c diff, who was admitted on 9/15/2022 with melena and concerns for upper GI bleeding.     EGD was performed on the day of admit and complicated by clotted blood and stomach contents making visualization difficult.  Varices were identified but there was no active bleeding.       The patient has received 2 units PRBC since admit and has since remained hemodynamically stable.  He is receiving IV PPI and octreotide gtt. Repeat EGD completed 9/16 showed grade 2 esophageal varices without evidence of recent bleeding.  These were banded.  Clotted blood was noted in the cardia and gastric fundus, limiting evaluation.  GI expressed concern for possible gastric varix.     1. Acute blood loss anemia due to UGIB, no ongoing bleeding appreciated at this time.  Hemoglobin is currently stable for at least the last 48 hours.  - EGD on admit showed no active hemorrhage.  Varices identified but scope complicated by poor visualization due to stomach contents and clotted blood.  - repeat EGD completed 9/16 with esophageal variceal banding.  Adherent clot in the stomach was not removed.  Plan for repeat EGD on Monday.  - continue octreotide gtt  - continue IV PPI  - transfuse for hgb<8, monitor hemoglobins    Per Dr. Pace:  Continue IV PPI, IV octreotide, and keep NPO for now.  He anticipates pt to remain in house through the weekend and would consider repeat EGD prior to discharge to better eval stomach and rule out gastric varix given persistent clot in stomach on scope today.  If pt shows signs of re-bleed would tx to [facility] or [facility] for consideration of TIPS.  ppx  "Rocephin ordered in setting of UGIB and cirrhosis.  US with doppler ordered.  See EGD report for details     2. HTN  Not on medications, monitor    3.  ETOH cirrhosis without documented ascites, alcoholic hepatitis, fixed coagulopathy  - last use of ETOH was 2 weeks ago over Labor day  -At this time, the patient reports that he is planning to follow-up with [facility] on discharge.     Diet:    Low-fat diet  DVT Prophylaxis: Low Risk/Ambulatory with no VTE prophylaxis indicated  Ugarte Catheter: Not present  Central Lines: None  Cardiac Monitoring: None  Code Status:   full code  DISPO:  Per GI.  Await results of EGD today        Interval History (Subjective):      Mr. Kahn is feeling fairly well today.  Denies dizziness.  No further black bowel movements.  Denies shortness of breath and cough.  No nausea or vomiting.                  Physical Exam:      Last Vital Signs:  /68 (BP Location: Left arm)   Pulse 72   Temp 97.7  F (36.5  C) (Temporal)   Resp 16   Ht 1.727 m (5' 8\")   Wt 92.4 kg (203 lb 12.8 oz)   SpO2 96%   BMI 30.99 kg/m        Intake/Output Summary (Last 24 hours) at 9/16/2022 1348  Last data filed at 9/16/2022 0033  Gross per 24 hour   Intake 350 ml   Output --   Net 350 ml       Constitutional: Awake, alert, cooperative, no apparent distress.  No evident jaundice.   Respiratory: Clear to auscultation bilaterally, no crackles or wheezing   Cardiovascular: Regular rate and rhythm, normal S1 and S2, and no murmur noted   Abdomen: Normal bowel sounds, soft, non-distended, non-tender   Skin: No rashes, no cyanosis, dry to touch   Neuro: Alert and oriented x3, no weakness, numbness, memory loss   Extremities: No edema, normal range of motion   Other(s):        All other systems: Negative          Medications:      All current medications were reviewed with changes reflected in problem list.         Data:      All new lab and imaging data was reviewed.   Labs:  Recent Labs   Lab 09/17/22  1025 " 09/17/22  0621 09/16/22  0610    138 137   POTASSIUM 3.7 4.0 4.2   CHLORIDE 106 106 105   CO2 23 23 24   ANIONGAP 9 9 8   * 136* 138*   BUN 15.4 17.1 20.1   CR 0.89 0.93 1.04   GFRESTIMATED >90 >90 82   DEXTER 8.4* 8.3* 8.6*     Recent Labs   Lab 09/18/22  0648   WBC 4.9   HGB 8.4*   HCT 27.6*   MCV 78   *      Imaging:   No results found for this or any previous visit (from the past 24 hour(s)).

## 2022-09-18 NOTE — PLAN OF CARE
Goal Outcome Evaluation:    Plan of Care Reviewed With: patient, spouse     Overall Patient Progress: improving    Pt A&O x4. VS stable; afebrile. Denies pain. CMS intact. Up independently in room. Voiding in good amts. Tolerating low fat diet. Plan is home @ discharge.

## 2022-09-18 NOTE — PLAN OF CARE
Goal Outcome Evaluation:    Plan of Care Reviewed With: patient     Overall Patient Progress: improving     Pt A&O x4. VVS; a febrile. Denises pain. CMA intact. Up independent in room. Voiding good amount. Tolerating low fat diet well. Octreotide drip infusing at 10 ml/hr.Hgb stable at 8.5. Plan is home at discharge. Possible EDG before discharge, keeping over weekend for monitoring.

## 2022-09-19 VITALS
SYSTOLIC BLOOD PRESSURE: 131 MMHG | HEART RATE: 76 BPM | BODY MASS INDEX: 30.89 KG/M2 | OXYGEN SATURATION: 99 % | WEIGHT: 203.8 LBS | TEMPERATURE: 97.1 F | HEIGHT: 68 IN | RESPIRATION RATE: 18 BRPM | DIASTOLIC BLOOD PRESSURE: 72 MMHG

## 2022-09-19 PROBLEM — K27.9 PUD (PEPTIC ULCER DISEASE): Status: ACTIVE | Noted: 2022-09-19

## 2022-09-19 PROBLEM — K76.6 PORTAL HYPERTENSION (H): Status: ACTIVE | Noted: 2022-09-19

## 2022-09-19 PROBLEM — I85.10 SECONDARY ESOPHAGEAL VARICES WITHOUT BLEEDING (H): Status: ACTIVE | Noted: 2022-09-19

## 2022-09-19 PROBLEM — I10 ESSENTIAL HYPERTENSION: Status: ACTIVE | Noted: 2017-11-13

## 2022-09-19 LAB
HGB BLD-MCNC: 8.8 G/DL (ref 13.3–17.7)
UPPER GI ENDOSCOPY: NORMAL

## 2022-09-19 PROCEDURE — C9113 INJ PANTOPRAZOLE SODIUM, VIA: HCPCS | Performed by: INTERNAL MEDICINE

## 2022-09-19 PROCEDURE — 36415 COLL VENOUS BLD VENIPUNCTURE: CPT | Performed by: INTERNAL MEDICINE

## 2022-09-19 PROCEDURE — 250N000009 HC RX 250: Performed by: INTERNAL MEDICINE

## 2022-09-19 PROCEDURE — 258N000003 HC RX IP 258 OP 636: Performed by: INTERNAL MEDICINE

## 2022-09-19 PROCEDURE — 43235 EGD DIAGNOSTIC BRUSH WASH: CPT | Performed by: INTERNAL MEDICINE

## 2022-09-19 PROCEDURE — 250N000011 HC RX IP 250 OP 636: Performed by: INTERNAL MEDICINE

## 2022-09-19 PROCEDURE — 85018 HEMOGLOBIN: CPT | Performed by: INTERNAL MEDICINE

## 2022-09-19 PROCEDURE — 99239 HOSP IP/OBS DSCHRG MGMT >30: CPT | Performed by: INTERNAL MEDICINE

## 2022-09-19 PROCEDURE — 0DJ08ZZ INSPECTION OF UPPER INTESTINAL TRACT, VIA NATURAL OR ARTIFICIAL OPENING ENDOSCOPIC: ICD-10-PCS | Performed by: INTERNAL MEDICINE

## 2022-09-19 PROCEDURE — 999N000099 HC STATISTIC MODERATE SEDATION < 10 MIN: Performed by: INTERNAL MEDICINE

## 2022-09-19 RX ORDER — LIDOCAINE 40 MG/G
CREAM TOPICAL
Status: DISCONTINUED | OUTPATIENT
Start: 2022-09-19 | End: 2022-09-19 | Stop reason: HOSPADM

## 2022-09-19 RX ORDER — PANTOPRAZOLE SODIUM 40 MG/1
40 TABLET, DELAYED RELEASE ORAL
Qty: 30 TABLET | Refills: 1 | Status: SHIPPED | OUTPATIENT
Start: 2022-09-20 | End: 2022-11-23

## 2022-09-19 RX ORDER — FLUMAZENIL 0.1 MG/ML
0.2 INJECTION, SOLUTION INTRAVENOUS
Status: CANCELLED | OUTPATIENT
Start: 2022-09-19 | End: 2022-09-20

## 2022-09-19 RX ORDER — NALOXONE HYDROCHLORIDE 0.4 MG/ML
0.2 INJECTION, SOLUTION INTRAMUSCULAR; INTRAVENOUS; SUBCUTANEOUS
Status: DISCONTINUED | OUTPATIENT
Start: 2022-09-19 | End: 2022-09-19 | Stop reason: HOSPADM

## 2022-09-19 RX ORDER — PANTOPRAZOLE SODIUM 40 MG/1
40 TABLET, DELAYED RELEASE ORAL
Status: DISCONTINUED | OUTPATIENT
Start: 2022-09-20 | End: 2022-09-19 | Stop reason: HOSPADM

## 2022-09-19 RX ORDER — LEVOFLOXACIN 500 MG/1
500 TABLET, FILM COATED ORAL ONCE
Qty: 1 TABLET | Refills: 0 | Status: SHIPPED | OUTPATIENT
Start: 2022-09-20 | End: 2022-09-20

## 2022-09-19 RX ORDER — DIPHENHYDRAMINE HYDROCHLORIDE 50 MG/ML
25-50 INJECTION INTRAMUSCULAR; INTRAVENOUS
Status: COMPLETED | OUTPATIENT
Start: 2022-09-19 | End: 2022-09-19

## 2022-09-19 RX ORDER — SIMETHICONE 40MG/0.6ML
133 SUSPENSION, DROPS(FINAL DOSAGE FORM)(ML) ORAL
Status: DISCONTINUED | OUTPATIENT
Start: 2022-09-19 | End: 2022-09-19 | Stop reason: HOSPADM

## 2022-09-19 RX ORDER — EPINEPHRINE 1 MG/ML
0.1 INJECTION, SOLUTION INTRAMUSCULAR; SUBCUTANEOUS
Status: DISCONTINUED | OUTPATIENT
Start: 2022-09-19 | End: 2022-09-19 | Stop reason: HOSPADM

## 2022-09-19 RX ORDER — ATROPINE SULFATE 0.1 MG/ML
1 INJECTION INTRAVENOUS
Status: DISCONTINUED | OUTPATIENT
Start: 2022-09-19 | End: 2022-09-19 | Stop reason: HOSPADM

## 2022-09-19 RX ORDER — NALOXONE HYDROCHLORIDE 0.4 MG/ML
0.4 INJECTION, SOLUTION INTRAMUSCULAR; INTRAVENOUS; SUBCUTANEOUS
Status: DISCONTINUED | OUTPATIENT
Start: 2022-09-19 | End: 2022-09-19 | Stop reason: HOSPADM

## 2022-09-19 RX ORDER — FENTANYL CITRATE 0.05 MG/ML
50-100 INJECTION, SOLUTION INTRAMUSCULAR; INTRAVENOUS EVERY 5 MIN PRN
Status: DISCONTINUED | OUTPATIENT
Start: 2022-09-19 | End: 2022-09-19 | Stop reason: HOSPADM

## 2022-09-19 RX ORDER — NADOLOL 20 MG/1
20 TABLET ORAL DAILY
Status: DISCONTINUED | OUTPATIENT
Start: 2022-09-20 | End: 2022-09-19 | Stop reason: HOSPADM

## 2022-09-19 RX ORDER — NADOLOL 20 MG/1
20 TABLET ORAL DAILY
Qty: 30 TABLET | Refills: 1 | Status: SHIPPED | OUTPATIENT
Start: 2022-09-20 | End: 2022-11-08

## 2022-09-19 RX ORDER — FLUMAZENIL 0.1 MG/ML
0.2 INJECTION, SOLUTION INTRAVENOUS
Status: DISCONTINUED | OUTPATIENT
Start: 2022-09-19 | End: 2022-09-19 | Stop reason: HOSPADM

## 2022-09-19 RX ADMIN — PANTOPRAZOLE SODIUM 40 MG: 40 INJECTION, POWDER, FOR SOLUTION INTRAVENOUS at 07:38

## 2022-09-19 RX ADMIN — DIPHENHYDRAMINE HYDROCHLORIDE 50 MG: 50 INJECTION, SOLUTION INTRAMUSCULAR; INTRAVENOUS at 12:23

## 2022-09-19 RX ADMIN — TOPICAL ANESTHETIC 0.5 ML: 200 SPRAY DENTAL; PERIODONTAL at 12:23

## 2022-09-19 RX ADMIN — FENTANYL CITRATE 100 MCG: 50 INJECTION INTRAMUSCULAR; INTRAVENOUS at 12:23

## 2022-09-19 RX ADMIN — CEFTRIAXONE 1 G: 1 INJECTION, POWDER, FOR SOLUTION INTRAMUSCULAR; INTRAVENOUS at 17:02

## 2022-09-19 RX ADMIN — MIDAZOLAM HYDROCHLORIDE 2 MG: 1 INJECTION, SOLUTION INTRAMUSCULAR; INTRAVENOUS at 12:21

## 2022-09-19 RX ADMIN — OCTREOTIDE ACETATE 50 MCG/HR: 200 INJECTION, SOLUTION INTRAVENOUS; SUBCUTANEOUS at 02:23

## 2022-09-19 ASSESSMENT — ACTIVITIES OF DAILY LIVING (ADL)
ADLS_ACUITY_SCORE: 24

## 2022-09-19 NOTE — PLAN OF CARE
Goal Outcome Evaluation:    Plan of Care Reviewed With: patient     Overall Patient Progress: improving     Py A&O x4. VVS; afebrile. Pt caleb pain, N/V. Pt reports no BM over night. Independent in room. Voiding good amount. NPO at midnight for EDG procedure. EDG procedure scheduled for 1120 today. Plan is home at discharge.

## 2022-09-19 NOTE — PLAN OF CARE
Goal Outcome Evaluation:    Patient discharged to home. Discharge instructions gone through with patient and spouse, no questions. Discharge medications given to patient. IV taken out. Patient left with spouse.

## 2022-09-19 NOTE — PLAN OF CARE
Goal Outcome Evaluation:    Patient tolerated a full liquid diet and a regular diet. No discomfort patient will be be discharging this evening.

## 2022-09-19 NOTE — PROCEDURES
INPATIENT PRE-PROCEDURE NOTE    CHIEF COMPLAINT / REASON FOR PROCEDURE: melena    Admission History and Physical Reviewed, including past medical history, social history family history, ROS, and interval progress notes: on chart-<30 days old; updated within 7 days.    PRE-SEDATION ASSESSMENT:  Lung Exam: normal  Heart Exam: normal  Airway Exam: Normal  Previous reaction to anesthesia/sedation: NO  Sedation plan based on assessment:Moderate (conscious) sedation  ASA Classification: 2 - Mild systemic disease       IMPRESSION: melena    PLAN: ROBERTO Adame MD

## 2022-09-19 NOTE — PLAN OF CARE
Goal Outcome Evaluation:      No c/o pain. Up indep in room. Brigido reg diet. Active bowel sounds- passing gas. Voiding without difficulty. EGD tomorrow. Pt aware is NPO at midnight

## 2022-09-19 NOTE — DISCHARGE SUMMARY
[facility] Discharge Summary    Yakov Kahn MRN# [id number]   Age: 61 year old YOB: 1960     Date of Admission:  9/15/2022  Date of Discharge::  9/19/2022  Admitting Physician:  Hemanth Mark MD  Discharge Physician:  Emmanuel Perez MD     Home clinic: [facility]          Admission Diagnoses:   Melena [K92.1]  Anemia, unspecified type [D64.9]          Discharge Diagnosis:   Principal Problem:    Anemia due to blood loss, acute  Active Problems:    Essential hypertension    Alcoholic cirrhosis, unspecified whether ascites present (H)    Melena    PUD (peptic ulcer disease)    Secondary esophageal varices without bleeding (H)    Portal hypertension (H)            Procedures:   EGD x 2. Banding of esophageal varices.          Medications Prior to Admission:     Medications Prior to Admission   Medication Sig Dispense Refill Last Dose     cetirizine (ZYRTEC) 10 MG tablet Take 10 mg by mouth daily as needed for allergies        levocetirizine (XYZAL) 5 MG tablet Take 5 mg by mouth every evening   9/14/2022 at Unknown time             Discharge Medications:     Current Discharge Medication List      START taking these medications    Details   levofloxacin (LEVAQUIN) 500 MG tablet Take 1 tablet (500 mg) by mouth once for 1 dose  Qty: 1 tablet, Refills: 0    Associated Diagnoses: Portal hypertension (H); PUD (peptic ulcer disease)      nadolol (CORGARD) 20 MG tablet Take 1 tablet (20 mg) by mouth daily  Qty: 30 tablet, Refills: 1    Associated Diagnoses: Portal hypertension (H)      pantoprazole (PROTONIX) 40 MG EC tablet Take 1 tablet (40 mg) by mouth every morning (before breakfast)  Qty: 30 tablet, Refills: 1    Associated Diagnoses: Portal hypertension (H); PUD (peptic ulcer disease); Upper GI bleed         CONTINUE these medications which have NOT CHANGED    Details   cetirizine (ZYRTEC) 10 MG tablet Take 10 mg by mouth daily as needed for allergies     "  levocetirizine (XYZAL) 5 MG tablet Take 5 mg by mouth every evening                   Consultations:   Consultation during this admission received from gastroenterology          Hospital Course:   Yakov Kahn is a 61 year old male with PMHx significant for alcoholic cirrhosis, HTN, hx of upper GI bleed of unclear source despite extensive evaluation, hx of grade I esophageal varices and hx of c diff, who was admitted on 9/15/2022 with melena and concerns for upper GI bleeding.      EGD was performed on the day of admit and complicated by clotted blood and stomach contents making visualization difficult.  Varices were identified but there was no active bleeding.        The patient has received 2 units PRBC since admit and has since remained hemodynamically stable.  He is receiving IV PPI and octreotide gtt. Repeat EGD completed 9/16 showed grade 2 esophageal varices without evidence of recent bleeding.  These were banded.  Clotted blood was noted in the cardia and gastric fundus, limiting evaluation.  GI expressed concern for possible gastric varix so the patient was observed over the weekend on an Octreotide drip and with clear liquid diet.    On the date of discharge, Mr. Kahn was taken down for repeat endoscopy which demonstrated nonbleeding gastric ulcers.  Esophageal varices that have been clipped showed no evidence for bleeding.  Although the patient had portal hypertensive gastropathy, no gastric varices were identified.    /66   Pulse 65   Temp 97.5  F (36.4  C) (Temporal)   Resp 8   Ht 1.727 m (5' 8\")   Wt 92.4 kg (203 lb 12.8 oz)   SpO2 95%   BMI 30.99 kg/m    On the date of discharge, Mr. Kahn is alert, coherent and in NAD. He has had stable hemoglobin values over the past 48 hours, at least.   HEENT: no focal muscular asymmetry.  COR: RRR without murmur  Chest: CTA  GI: Soft, NTND without palpable HSM  Extrem: no edema.          Discharge Instructions and Follow-Up:   Discharge diet: " Regular   Discharge activity: Return to full activity gradually. OK to plan to return to work as of next Monday.   Discharge follow-up: With [facility] as planned.   With PMD as needed.            Discharge Disposition:   Discharged to home      Attestation:  I have reviewed today's vital signs, notes, medications, labs and imaging.  Total time: 45 minutes    Emmanuel Perez MD

## 2022-09-19 NOTE — PLAN OF CARE
Goal Outcome Evaluation:     estella PO clears well, ordering full liquids now and soft diet for supper, if estella po well can discharge to home this evening, up independently, denies pain, voiding in good amts, plans to dc to discharge to home after supper

## 2022-09-21 ENCOUNTER — PATIENT OUTREACH (OUTPATIENT)
Dept: CARE COORDINATION | Facility: CLINIC | Age: 62
End: 2022-09-21

## 2022-09-21 NOTE — PROGRESS NOTES
Clinic Care Coordination Contact  [facility]/Voicemail       Clinical Data: Care Coordinator Outreach  Outreach attempted x 2.  Left message on patient's voicemail with call back information and requested return call.  Plan: Care Coordinator will do no further outreaches at this time.    SARATH Tavares  [phone]  [facility]

## 2022-09-30 ENCOUNTER — MYC MEDICAL ADVICE (OUTPATIENT)
Dept: FAMILY MEDICINE | Facility: CLINIC | Age: 62
End: 2022-09-30

## 2022-10-03 NOTE — TELEPHONE ENCOUNTER
Called the pharmacy and spoke to Rey.  He has refills on both.    Spoke to Kymberly.  Pt does have refills on both pantoprazole and nodolol.  She said he could call them if he wanted them filled there or he could have them filled at any  pharmacy.      Called the pt and left a message to call us back, will also mary.

## 2022-10-19 NOTE — DISCHARGE SUMMARY
[facility]  Hospitalist Discharge Summary      Date of Admission:  5/16/2021  Date of Discharge:  5/20/2021  Discharging Provider: Sivan Ibarra MD      Discharge Diagnoses         Follow-ups Needed After Discharge   Follow-up Appointments     Follow-up and recommended labs and tests       Follow up with primary care provider, Humberto Vora, within 7 days   for hospital follow- up.  The following labs/tests are recommended: cbc,   cmp.  Follow up with [facility] as scheduled             Unresulted Labs Ordered in the Past 30 Days of this Admission     No orders found from 4/16/2021 to 5/17/2021.          Discharge Disposition   Discharged to home  Condition at discharge: Stable      Hospital Course          Summary of Stay: Yakov Kahn is a 60 year old male with a history of daily etoh use. He presented to the hospital with complaints of fatigue and generalized weakness for the past several weeks.  He does not routinely seek out medical care.  Important social issues are that of drinking daily up to 4 drinks per day.       ER evaluation notable for tachycardia in the 100-110's, but BPs wnl.  Laboratory eval most notable for profound anemia with hgb 3.0.  He was also noted to have minimally elevated AST and direct t bili, NH3 of 40, and imaging suggesting cirrhosis of the liver.      He was admitted on 5/16/2021 for transfusion and w/u of newly diagnosed anemia.      Iron studies consistent with MIRANDA and MCV was low. He was admitted to the ICU for close observation and treated with PPI and octreotide gtt.  And transfused with a goal hgb of 7.0     He was treated with PPI and octreotide gtt/IVF, serial hgb, and transfusions.     There has been no evidence of ABL while hospitalized, GI consulted and he underwent EGD on 5/17/21 and found to have G1 varices without e/o bleeding.  He is to undergo Colonoscopy negative, VCE set up for today.      He has rec'd a total of 5 unit(s) PRBCs thus far  in his hospitalization      Problem List:   Profound Anemia 2/2 suspected GI source;  MIRANDA by iron studies.  Had some black tarry stools as well.  Clearly chronic given the stability of his VS.  -EGD with G1 varices but no evidence of bleeding and lack of hematemesis makes this an unlikely source.  Colonoscopy completed and notable for some polyps but no bleeding.   -video-capsule endoscopy today  -s/p 5 U PRBCs thus far  -will give IV venofer - discussed risks of benefits   -empiric PPI      Newly diagnosed cirrhosis without decompensation and complicated by grade 1 varices/internal hemorrhoids suggesting increased portal pressure.    -hepatitis serologies negative, therefore suspect related to alcohol consumption and potentially VILLARREAL   -he was given a dose of ceftriaxone which was discontinued yesterday given negative EGD  -propanolol started for elevated portal pressures (octreotide gtt stopped when EGD without significant or bleeding varices)  -AFB 5.6  -suspect he should f/u with hepatologist in OP setting   -given nodularity of liver radiology recommends MRI in outpatient setting.  My partner Dr Mark provided a copy of the CT report indicating recommendation for MRI for the patient and his spouse.  They will have their PCP arrange     GABINO during brief hospitalization 2/2 prerenal azotemia  -resolved     Hyperglycemia  hgb A1c 5.4 % so suspect just a stress reaction      Alcohol misuse disorder without e/o dependence or withdrawal      Admitted as inpatient. S/p 5 units PRBC's. EGD showed G1 esophageal varices w/p stigmata of bleeding. Colonoscopy showed polyps which were resected. Capsule endoscopy pending. Pt will go home and f/u with PCP and [facility]. Instructed to f/u with PCP for liver MRI.    Nonsevere malnutrition in context of chronic illness.      Consultations This Hospital Stay   GASTROENTEROLOGY IP CONSULT    Code Status   Full Code    Time Spent on this Encounter   I, Sivan Ibarra MD, personally  saw the patient today and spent greater than 30 minutes discharging this patient.       Sivan Ibarra MD  [facility] [id number] [facility]  [address] [place] [address]  Phone: [phone]  Fax: [phone]  ______________________________________________________________________    Physical Exam   Vital Signs: Temp: 98.9  F (37.2  C) Temp src: Oral BP: 106/53 Pulse: 71   Resp: 18 SpO2: 99 % O2 Device: None (Room air)    Weight: 200 lbs 12.8 oz    Gen - AAO x 3 in NAD.  Lungs - CTA B.  Heart - RR,S1+S2 nml, no m/g/r.  Abd - soft, NT, ND, + BS.  Ext - 2+ edema.       Primary Care Physician   Humberto Vora    Discharge Orders      Follow-up and recommended labs and tests     Follow up with primary care provider, Humberto Vora, within 7 days for hospital follow- up.  The following labs/tests are recommended: cbc, cmp.  Follow up with [facility] as scheduled     Activity    Your activity upon discharge: activity as tolerated     Full Code     Diet    Follow this diet upon discharge: 2g salt       Significant Results and Procedures   Results for orders placed or performed during the hospital encounter of 05/16/21   Abd/pelvis CT,  IV  contrast only TRAUMA / AAA    Narrative    CT ABDOMEN AND PELVIS WITHOUT CONTRAST 5/16/2021 1:49 PM    CLINICAL HISTORY: Abdominal distension; Bowel obstruction suspected;  Acute jaundice.    TECHNIQUE: CT scan of the abdomen and pelvis was performed following  injection of IV contrast. Multiplanar reformats were obtained. Dose  reduction techniques were used.    CONTRAST: 95mL Isovue-370    COMPARISON: None.    FINDINGS:   LOWER CHEST: Normal.    HEPATOBILIARY: Micronodular opacities noted diffusely throughout the  liver with peripheral hepatic nodular contour consistent with  cirrhosis. A focal small hypodense nodule is 1.2 cm at the inferior  right liver series 3 image 102, as an example lesion. There is small  ascites. Moderate distention of the  gallbladder. Multiple vascular  varices identified including esophageal varices and recannulated  paraumbilical vein.    PANCREAS: No visible focal pancreas lesion. No main pancreatic duct  dilatation or fluid collection.    SPLEEN: Mild splenomegaly with craniocaudal length of 14 cm.    ADRENAL GLANDS: Normal.    KIDNEYS/BLADDER: No significant mass, stones, or hydronephrosis. There  are simple or benign cysts. No follow up is needed.    BOWEL: No evidence for small bowel obstruction. There is edema  diffusely involving the duodenum and right colon. No evidence for  appendicitis. No abscess or free air.    PELVIC ORGANS: Small ascites. Mildly prominent prostate measuring 5  cm.    ADDITIONAL FINDINGS: Small periumbilical fat-containing hernia that is  2.9 cm image 140.    MUSCULOSKELETAL: Normal.      Impression    IMPRESSION:   1.  Cirrhotic liver. Portal hypertension evidenced by multiple  vascular varices and mild splenomegaly. There is small abdominal and  pelvic ascites. Nodularity noted throughout the liver including an  example at the inferior right liver. Recommend correlation with  nonurgent liver MRI.  2.  Edema of the duodenum and right colon could relate to ascites and  liver disease. Cannot exclude an infectious or inflammatory cause of  duodenitis and colitis. No obstruction.  3.  Mildly prominent prostate.  4.  Distended gallbladder.    SHARYN MATTSON MD       Discharge Medications   Current Discharge Medication List      START taking these medications    Details   ferrous sulfate (FEROSUL) 325 (65 Fe) MG tablet Take 1 tablet (325 mg) by mouth 2 times daily  Qty: 60 tablet, Refills: 0    Associated Diagnoses: Anemia due to blood loss, acute      pantoprazole (PROTONIX) 40 MG EC tablet Take 1 tablet (40 mg) by mouth every morning (before breakfast)  Qty: 30 tablet, Refills: 0    Associated Diagnoses: Anemia due to blood loss, acute      propranolol (INDERAL) 10 MG tablet Take 1 tablet (10 mg) by mouth 3  times daily  Qty: 90 tablet, Refills: 0    Associated Diagnoses: Alcoholic cirrhosis of liver without ascites (H)         CONTINUE these medications which have NOT CHANGED    Details   levocetirizine (XYZAL) 5 MG tablet Take 5 mg by mouth every evening           Allergies   No Known Allergies   no

## 2022-11-20 ENCOUNTER — HEALTH MAINTENANCE LETTER (OUTPATIENT)
Age: 62
End: 2022-11-20

## 2022-12-04 ENCOUNTER — MYC MEDICAL ADVICE (OUTPATIENT)
Dept: FAMILY MEDICINE | Facility: CLINIC | Age: 62
End: 2022-12-04

## 2022-12-25 DIAGNOSIS — K76.6 PORTAL HYPERTENSION (H): ICD-10-CM

## 2022-12-27 RX ORDER — NADOLOL 20 MG/1
20 TABLET ORAL DAILY
Qty: 30 TABLET | Refills: 0 | Status: SHIPPED | OUTPATIENT
Start: 2022-12-27 | End: 2023-01-03

## 2022-12-27 NOTE — TELEPHONE ENCOUNTER
Routing refill request to provider for review/approval because:  A break in medication    Gisela Souza RN on 12/27/2022 at 11:23 AM

## 2023-01-03 ENCOUNTER — OFFICE VISIT (OUTPATIENT)
Dept: FAMILY MEDICINE | Facility: CLINIC | Age: 63
End: 2023-01-03
Payer: COMMERCIAL

## 2023-01-03 VITALS
HEART RATE: 78 BPM | WEIGHT: 195.9 LBS | TEMPERATURE: 97.6 F | BODY MASS INDEX: 29.69 KG/M2 | RESPIRATION RATE: 15 BRPM | SYSTOLIC BLOOD PRESSURE: 128 MMHG | HEIGHT: 68 IN | DIASTOLIC BLOOD PRESSURE: 74 MMHG | OXYGEN SATURATION: 98 %

## 2023-01-03 DIAGNOSIS — K76.6 PORTAL HYPERTENSION (H): ICD-10-CM

## 2023-01-03 DIAGNOSIS — Z00.00 ROUTINE GENERAL MEDICAL EXAMINATION AT A HEALTH CARE FACILITY: Primary | ICD-10-CM

## 2023-01-03 DIAGNOSIS — Z13.220 SCREENING FOR HYPERLIPIDEMIA: ICD-10-CM

## 2023-01-03 DIAGNOSIS — D62 ANEMIA DUE TO BLOOD LOSS, ACUTE: ICD-10-CM

## 2023-01-03 DIAGNOSIS — K70.30 ALCOHOLIC CIRRHOSIS, UNSPECIFIED WHETHER ASCITES PRESENT (H): ICD-10-CM

## 2023-01-03 DIAGNOSIS — Z11.4 SCREENING FOR HIV (HUMAN IMMUNODEFICIENCY VIRUS): ICD-10-CM

## 2023-01-03 DIAGNOSIS — Z12.5 SCREENING FOR PROSTATE CANCER: ICD-10-CM

## 2023-01-03 PROBLEM — A04.72 CLOSTRIDIUM DIFFICILE DIARRHEA: Status: RESOLVED | Noted: 2021-06-18 | Resolved: 2023-01-03

## 2023-01-03 PROBLEM — K92.1 MELENA: Status: RESOLVED | Noted: 2022-09-15 | Resolved: 2023-01-03

## 2023-01-03 PROBLEM — K92.2 UPPER GI BLEED: Status: RESOLVED | Noted: 2021-05-16 | Resolved: 2023-01-03

## 2023-01-03 PROBLEM — D64.9 ANEMIA, UNSPECIFIED TYPE: Status: RESOLVED | Noted: 2022-09-15 | Resolved: 2023-01-03

## 2023-01-03 LAB
ALBUMIN SERPL BCG-MCNC: 4.2 G/DL (ref 3.5–5.2)
ALP SERPL-CCNC: 99 U/L (ref 40–129)
ALT SERPL W P-5'-P-CCNC: 38 U/L (ref 10–50)
ANION GAP SERPL CALCULATED.3IONS-SCNC: 12 MMOL/L (ref 7–15)
AST SERPL W P-5'-P-CCNC: 44 U/L (ref 10–50)
BASOPHILS # BLD AUTO: 0 10E3/UL (ref 0–0.2)
BASOPHILS NFR BLD AUTO: 1 %
BILIRUB SERPL-MCNC: 1 MG/DL
BUN SERPL-MCNC: 12.5 MG/DL (ref 8–23)
CALCIUM SERPL-MCNC: 9.7 MG/DL (ref 8.8–10.2)
CHLORIDE SERPL-SCNC: 105 MMOL/L (ref 98–107)
CHOLEST SERPL-MCNC: 162 MG/DL
CREAT SERPL-MCNC: 0.78 MG/DL (ref 0.67–1.17)
DEPRECATED HCO3 PLAS-SCNC: 21 MMOL/L (ref 22–29)
EOSINOPHIL # BLD AUTO: 0.1 10E3/UL (ref 0–0.7)
EOSINOPHIL NFR BLD AUTO: 2 %
ERYTHROCYTE [DISTWIDTH] IN BLOOD BY AUTOMATED COUNT: 23.5 % (ref 10–15)
FERRITIN SERPL-MCNC: 10 NG/ML (ref 31–409)
GFR SERPL CREATININE-BSD FRML MDRD: >90 ML/MIN/1.73M2
GLUCOSE SERPL-MCNC: 141 MG/DL (ref 70–99)
HCT VFR BLD AUTO: 36.1 % (ref 40–53)
HDLC SERPL-MCNC: 69 MG/DL
HGB BLD-MCNC: 10 G/DL (ref 13.3–17.7)
IMM GRANULOCYTES # BLD: 0 10E3/UL
IMM GRANULOCYTES NFR BLD: 1 %
IRON BINDING CAPACITY (ROCHE): 438 UG/DL (ref 240–430)
IRON SATN MFR SERPL: 6 % (ref 15–46)
IRON SERPL-MCNC: 28 UG/DL (ref 61–157)
LDLC SERPL CALC-MCNC: 76 MG/DL
LYMPHOCYTES # BLD AUTO: 1 10E3/UL (ref 0.8–5.3)
LYMPHOCYTES NFR BLD AUTO: 21 %
MCH RBC QN AUTO: 20.4 PG (ref 26.5–33)
MCHC RBC AUTO-ENTMCNC: 27.7 G/DL (ref 31.5–36.5)
MCV RBC AUTO: 74 FL (ref 78–100)
MONOCYTES # BLD AUTO: 0.5 10E3/UL (ref 0–1.3)
MONOCYTES NFR BLD AUTO: 10 %
NEUTROPHILS # BLD AUTO: 2.9 10E3/UL (ref 1.6–8.3)
NEUTROPHILS NFR BLD AUTO: 65 %
NONHDLC SERPL-MCNC: 93 MG/DL
NRBC # BLD AUTO: 0 10E3/UL
NRBC BLD AUTO-RTO: 0 /100
PLATELET # BLD AUTO: 139 10E3/UL (ref 150–450)
POTASSIUM SERPL-SCNC: 4.4 MMOL/L (ref 3.4–5.3)
PROT SERPL-MCNC: 7.4 G/DL (ref 6.4–8.3)
PSA SERPL-MCNC: 0.98 NG/ML (ref 0–4.5)
RBC # BLD AUTO: 4.89 10E6/UL (ref 4.4–5.9)
SODIUM SERPL-SCNC: 138 MMOL/L (ref 136–145)
TRIGL SERPL-MCNC: 83 MG/DL
WBC # BLD AUTO: 4.5 10E3/UL (ref 4–11)

## 2023-01-03 PROCEDURE — 83540 ASSAY OF IRON: CPT | Performed by: PHYSICIAN ASSISTANT

## 2023-01-03 PROCEDURE — 82728 ASSAY OF FERRITIN: CPT | Performed by: PHYSICIAN ASSISTANT

## 2023-01-03 PROCEDURE — 85025 COMPLETE CBC W/AUTO DIFF WBC: CPT | Performed by: PHYSICIAN ASSISTANT

## 2023-01-03 PROCEDURE — G0103 PSA SCREENING: HCPCS | Performed by: PHYSICIAN ASSISTANT

## 2023-01-03 PROCEDURE — 36415 COLL VENOUS BLD VENIPUNCTURE: CPT | Performed by: PHYSICIAN ASSISTANT

## 2023-01-03 PROCEDURE — 87389 HIV-1 AG W/HIV-1&-2 AB AG IA: CPT | Performed by: PHYSICIAN ASSISTANT

## 2023-01-03 PROCEDURE — 99396 PREV VISIT EST AGE 40-64: CPT | Performed by: PHYSICIAN ASSISTANT

## 2023-01-03 PROCEDURE — 80061 LIPID PANEL: CPT | Performed by: PHYSICIAN ASSISTANT

## 2023-01-03 PROCEDURE — 99213 OFFICE O/P EST LOW 20 MIN: CPT | Mod: 25 | Performed by: PHYSICIAN ASSISTANT

## 2023-01-03 PROCEDURE — 80053 COMPREHEN METABOLIC PANEL: CPT | Performed by: PHYSICIAN ASSISTANT

## 2023-01-03 PROCEDURE — 83550 IRON BINDING TEST: CPT | Performed by: PHYSICIAN ASSISTANT

## 2023-01-03 ASSESSMENT — PAIN SCALES - GENERAL: PAINLEVEL: NO PAIN (0)

## 2023-01-03 NOTE — PROGRESS NOTES
SUBJECTIVE:   CC: Christiano is an 62 year old who presents for preventative health visit.     History of Present Illness       Reason for visit:  Annual    He eats 2-3 servings of fruits and vegetables daily.He consumes 0 sweetened beverage(s) daily.He exercises with enough effort to increase his heart rate 60 or more minutes per day.  He exercises with enough effort to increase his heart rate 3 or less days per week.   He is taking medications regularly.    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  Did have a hospitalization in September for melena, slow GI/variceal bleed  He has not followed up with his GI specialist since then  Stopped pantoprazole and nodolol because it was making him feel tired     Did go on a diet earlier this fall which has helped with weight      Today's PHQ-2 Score:   PHQ-2 ( 1999 Pfizer) 1/3/2023   Q1: Little interest or pleasure in doing things 0   Q2: Feeling down, depressed or hopeless 0   PHQ-2 Score 0   PHQ-2 Total Score (12-17 Years)- Positive if 3 or more points; Administer PHQ-A if positive -   Q1: Little interest or pleasure in doing things Not at all   Q2: Feeling down, depressed or hopeless Not at all   PHQ-2 Score 0       Have you ever done Advance Care Planning? (For example, a Health Directive, POLST, or a discussion with a medical provider or your loved ones about your wishes): No, advance care planning information given to patient to review.  Patient declined advance care planning discussion at this time.    Social History     Tobacco Use     Smoking status: Never     Smokeless tobacco: Former   Substance Use Topics     Alcohol use: Yes     Comment: Monthly on special occasions     If you drink alcohol do you typically have >3 drinks per day or >7 drinks per week? Not applicable    No flowsheet data found.    Last PSA:   PSA   Date Value Ref Range Status   05/28/2021 1.48 0 - 4 ug/L Final     Comment:  "    Assay Method:  Chemiluminescence using Siemens Vista analyzer       Reviewed orders with patient. Reviewed health maintenance and updated orders accordingly - Yes  Lab work is in process  Labs reviewed in EPIC    Reviewed and updated as needed this visit by clinical staff   Tobacco  Allergies  Meds              Reviewed and updated as needed this visit by Provider                     Review of Systems  CONSTITUTIONAL: NEGATIVE for fever, chills, change in weight  INTEGUMENTARY/SKIN: NEGATIVE for worrisome rashes, moles or lesions  EYES: NEGATIVE for vision changes or irritation  ENT: NEGATIVE for ear, mouth and throat problems  RESP: NEGATIVE for significant cough or SOB  CV: NEGATIVE for chest pain, palpitations or peripheral edema  GI: NEGATIVE for nausea, abdominal pain, heartburn, or change in bowel habits   male: negative for dysuria, hematuria, decreased urinary stream, erectile dysfunction, urethral discharge  MUSCULOSKELETAL: NEGATIVE for significant arthralgias or myalgia  NEURO: NEGATIVE for weakness, dizziness or paresthesias  PSYCHIATRIC: POSITIVE for stress/grief following loss of his son    OBJECTIVE:   /74 (BP Location: Right arm, Patient Position: Sitting, Cuff Size: Adult Regular)   Pulse 78   Temp 97.6  F (36.4  C) (Oral)   Resp 15   Ht 1.72 m (5' 7.7\")   Wt 88.9 kg (195 lb 14.4 oz)   SpO2 98%   BMI 30.05 kg/m      Physical Exam  GENERAL: healthy, alert and no distress  EYES: Eyes grossly normal to inspection; no conjunctival pallor  HENT: ear canals and TM's normal, nose and mouth without ulcers or lesions  NECK: no adenopathy, no asymmetry, masses, or scars and thyroid normal to palpation  RESP: lungs clear to auscultation - no rales, rhonchi or wheezes  CV: regular rate and rhythm, normal S1 S2, no S3 or S4, no murmur, click or rub, no peripheral edema and peripheral pulses strong  ABDOMEN: soft, nontender and bowel sounds normal  MS: no gross musculoskeletal defects noted, " "no edema  SKIN: no suspicious lesions or rashes  PSYCH: mentation appears normal, affect normal/bright    Diagnostic Test Results:  Labs reviewed in Epic    ASSESSMENT/PLAN:   1. Routine general medical examination at a health care facility  Reviewed personal and family history. Reviewed age appropriate screenings. Recommended any needed vaccinations.  - Lipid panel reflex to direct LDL Non-fasting; Future  - Lipid panel reflex to direct LDL Non-fasting    2. Alcoholic cirrhosis, unspecified whether ascites present (H)  3. Portal hypertension (H)  4. Anemia due to blood loss, acute  He has not yet followed up with GI; he has STOPPED ppi and his nodolol because of side effects and does not want to go back on. We'll update labs today. He does have GI appt in February. He mentions feeling ok, back to work, etc   - Comprehensive metabolic panel (BMP + Alb, Alk Phos, ALT, AST, Total. Bili, TP)  - CBC with Platelets & Differential  - Ferritin  - Iron & Iron Binding Capacity    5. Screening for hyperlipidemia  - Lipid panel reflex to direct LDL Non-fasting; Future  - Lipid panel reflex to direct LDL Non-fasting    6. Screening for HIV (human immunodeficiency virus)  Per CDC  - HIV Antigen Antibody Combo; Future  - HIV Antigen Antibody Combo    7. Screening for prostate cancer  updating  - PSA, screen; Future  - PSA, screen        COUNSELING:   Reviewed preventive health counseling, as reflected in patient instructions      BMI:   Estimated body mass index is 30.05 kg/m  as calculated from the following:    Height as of this encounter: 1.72 m (5' 7.7\").    Weight as of this encounter: 88.9 kg (195 lb 14.4 oz).         He reports that he has never smoked. He has quit using smokeless tobacco.            Humberto Vora PA-C  [facility]  "

## 2023-01-03 NOTE — LETTER
January 6, 2023      Christiano BENTLEY Femi  [address]  [place] [address]      Hi Christiano - all of the labs are back now and overall many look better.   The PSA, screening the prostate, looked healthy.  Cholesterol looks excellent.  Screening of the liver enzymes, kidneys, electrolytes and sugars looked within the expected ranges. Sugars are flagging as high but not as worrisome as you were not fasting at the time. Really nice to see the liver enzymes looking good compared to 3 months ago - this is a direct impact of no alcohol.     The one concern is that youre still showing as rather anemic/iron deficient. I know you havent preferred a lot of the medications you've been started on but at the least id recommend you getting OTC iron (I like Vitron C the best) and being diligent about taking daily or every other day until your GI appt.      Let me know any questions!     Олег    Resulted Orders   Ferritin   Result Value Ref Range    Ferritin 10 (L) 31 - 409 ng/mL   Iron & Iron Binding Capacity   Result Value Ref Range    Iron 28 (L) 61 - 157 ug/dL    Iron Binding Capacity 438 (H) 240 - 430 ug/dL    Iron Sat Index 6 (L) 15 - 46 %   HIV Antigen Antibody Combo   Result Value Ref Range    HIV Antigen Antibody Combo Nonreactive Nonreactive      Comment:      HIV-1 p24 Ag & HIV-1/HIV-2 Ab Not Detected   Lipid panel reflex to direct LDL Non-fasting   Result Value Ref Range    Cholesterol 162 <200 mg/dL    Triglycerides 83 <150 mg/dL    Direct Measure HDL 69 >=40 mg/dL    LDL Cholesterol Calculated 76 <=100 mg/dL    Non HDL Cholesterol 93 <130 mg/dL    Narrative    Cholesterol  Desirable:  <200 mg/dL    Triglycerides  Normal:  Less than 150 mg/dL  Borderline High:  150-199 mg/dL  High:  200-499 mg/dL  Very High:  Greater than or equal to 500 mg/dL    Direct Measure HDL  Female:  Greater than or equal to 50 mg/dL   Male:  Greater than or equal to 40 mg/dL    LDL Cholesterol  Desirable:  <100mg/dL  Above Desirable:  100-129 mg/dL    Borderline High:  130-159 mg/dL   High:  160-189 mg/dL   Very High:  >= 190 mg/dL    Non HDL Cholesterol  Desirable:  130 mg/dL  Above Desirable:  130-159 mg/dL  Borderline High:  160-189 mg/dL  High:  190-219 mg/dL  Very High:  Greater than or equal to 220 mg/dL   Comprehensive metabolic panel (BMP + Alb, Alk Phos, ALT, AST, Total. Bili, TP)   Result Value Ref Range    Sodium 138 136 - 145 mmol/L    Potassium 4.4 3.4 - 5.3 mmol/L    Chloride 105 98 - 107 mmol/L    Carbon Dioxide (CO2) 21 (L) 22 - 29 mmol/L    Anion Gap 12 7 - 15 mmol/L    Urea Nitrogen 12.5 8.0 - 23.0 mg/dL    Creatinine 0.78 0.67 - 1.17 mg/dL    Calcium 9.7 8.8 - 10.2 mg/dL    Glucose 141 (H) 70 - 99 mg/dL    Alkaline Phosphatase 99 40 - 129 U/L    AST 44 10 - 50 U/L    ALT 38 10 - 50 U/L    Protein Total 7.4 6.4 - 8.3 g/dL    Albumin 4.2 3.5 - 5.2 g/dL    Bilirubin Total 1.0 <=1.2 mg/dL    GFR Estimate >90 >60 mL/min/1.73m2      Comment:      Effective December 21, 2021 eGFRcr in adults is calculated using the 2021 CKD-EPI creatinine equation which includes age and gender (Tree et al., NE, DOI: 10.1056/XUSQfz4985840)   PSA, screen   Result Value Ref Range    Prostate Specific Antigen Screen 0.98 0.00 - 4.50 ng/mL    Narrative    This result is obtained using the Roche Elecsys total PSA method on the marquis e801 immunoassay analyzer. Results obtained with different assay methods or kits cannot be used interchangeably.   CBC with platelets and differential   Result Value Ref Range    WBC Count 4.5 4.0 - 11.0 10e3/uL    RBC Count 4.89 4.40 - 5.90 10e6/uL    Hemoglobin 10.0 (L) 13.3 - 17.7 g/dL    Hematocrit 36.1 (L) 40.0 - 53.0 %    MCV 74 (L) 78 - 100 fL    MCH 20.4 (L) 26.5 - 33.0 pg    MCHC 27.7 (L) 31.5 - 36.5 g/dL    RDW 23.5 (H) 10.0 - 15.0 %    Platelet Count 139 (L) 150 - 450 10e3/uL    % Neutrophils 65 %    % Lymphocytes 21 %    % Monocytes 10 %    % Eosinophils 2 %    % Basophils 1 %    % Immature Granulocytes 1 %    NRBCs per 100 WBC  0 <1 /100    Absolute Neutrophils 2.9 1.6 - 8.3 10e3/uL    Absolute Lymphocytes 1.0 0.8 - 5.3 10e3/uL    Absolute Monocytes 0.5 0.0 - 1.3 10e3/uL    Absolute Eosinophils 0.1 0.0 - 0.7 10e3/uL    Absolute Basophils 0.0 0.0 - 0.2 10e3/uL    Absolute Immature Granulocytes 0.0 <=0.4 10e3/uL    Absolute NRBCs 0.0 10e3/uL       If you have any questions or concerns, please call the clinic at the number listed above.       Sincerely,      Humberto Vora PA-C

## 2023-01-04 LAB — HIV 1+2 AB+HIV1 P24 AG SERPL QL IA: NONREACTIVE

## 2023-01-26 ENCOUNTER — TRANSFERRED RECORDS (OUTPATIENT)
Dept: HEALTH INFORMATION MANAGEMENT | Facility: CLINIC | Age: 63
End: 2023-01-26
Payer: COMMERCIAL

## 2023-09-15 ENCOUNTER — HOSPITAL ENCOUNTER (OUTPATIENT)
Dept: MRI IMAGING | Facility: CLINIC | Age: 63
Discharge: HOME | End: 2023-09-15
Attending: INTERNAL MEDICINE | Admitting: INTERNAL MEDICINE
Payer: COMMERCIAL

## 2023-09-15 DIAGNOSIS — K70.9 ALCOHOLIC LIVER DISEASE (H): ICD-10-CM

## 2023-09-15 DIAGNOSIS — D50.0 IRON DEFICIENCY ANEMIA DUE TO CHRONIC BLOOD LOSS: ICD-10-CM

## 2023-09-15 PROCEDURE — 255N000002 HC RX 255 OP 636: Performed by: INTERNAL MEDICINE

## 2023-09-15 PROCEDURE — 74183 MRI ABD W/O CNTR FLWD CNTR: CPT

## 2023-09-15 PROCEDURE — A9585 GADOBUTROL INJECTION: HCPCS | Performed by: INTERNAL MEDICINE

## 2023-09-15 RX ORDER — GADOBUTROL 604.72 MG/ML
9 INJECTION INTRAVENOUS ONCE
Status: COMPLETED | OUTPATIENT
Start: 2023-09-15 | End: 2023-09-15

## 2023-09-15 RX ADMIN — GADOBUTROL 9 ML: 604.72 INJECTION INTRAVENOUS at 12:10

## 2023-09-25 ENCOUNTER — TRANSFERRED RECORDS (OUTPATIENT)
Dept: HEALTH INFORMATION MANAGEMENT | Facility: CLINIC | Age: 63
End: 2023-09-25
Payer: COMMERCIAL

## 2023-10-02 ENCOUNTER — TRANSFERRED RECORDS (OUTPATIENT)
Dept: HEALTH INFORMATION MANAGEMENT | Facility: CLINIC | Age: 63
End: 2023-10-02
Payer: COMMERCIAL

## 2023-10-02 LAB
ALT SERPL-CCNC: 28 IU/L (ref 0–44)
AST SERPL-CCNC: 27 IU/L (ref 0–40)
INR (EXTERNAL): 1.2 (ref 0.9–1.2)

## 2023-12-04 ENCOUNTER — PATIENT OUTREACH (OUTPATIENT)
Dept: CARE COORDINATION | Facility: CLINIC | Age: 63
End: 2023-12-04
Payer: COMMERCIAL

## 2023-12-18 ENCOUNTER — PATIENT OUTREACH (OUTPATIENT)
Dept: CARE COORDINATION | Facility: CLINIC | Age: 63
End: 2023-12-18
Payer: COMMERCIAL

## 2024-02-03 ENCOUNTER — HEALTH MAINTENANCE LETTER (OUTPATIENT)
Age: 64
End: 2024-02-03

## 2024-03-03 SDOH — HEALTH STABILITY: PHYSICAL HEALTH: ON AVERAGE, HOW MANY MINUTES DO YOU ENGAGE IN EXERCISE AT THIS LEVEL?: 60 MIN

## 2024-03-03 SDOH — HEALTH STABILITY: PHYSICAL HEALTH: ON AVERAGE, HOW MANY DAYS PER WEEK DO YOU ENGAGE IN MODERATE TO STRENUOUS EXERCISE (LIKE A BRISK WALK)?: 5 DAYS

## 2024-03-03 ASSESSMENT — SOCIAL DETERMINANTS OF HEALTH (SDOH): HOW OFTEN DO YOU GET TOGETHER WITH FRIENDS OR RELATIVES?: PATIENT DECLINED

## 2024-03-05 ENCOUNTER — OFFICE VISIT (OUTPATIENT)
Dept: FAMILY MEDICINE | Facility: CLINIC | Age: 64
End: 2024-03-05
Payer: COMMERCIAL

## 2024-03-05 VITALS
TEMPERATURE: 97.6 F | HEIGHT: 68 IN | RESPIRATION RATE: 23 BRPM | BODY MASS INDEX: 31.22 KG/M2 | HEART RATE: 75 BPM | OXYGEN SATURATION: 96 % | WEIGHT: 206 LBS | SYSTOLIC BLOOD PRESSURE: 131 MMHG | DIASTOLIC BLOOD PRESSURE: 79 MMHG

## 2024-03-05 DIAGNOSIS — Z12.11 SCREENING FOR COLON CANCER: ICD-10-CM

## 2024-03-05 DIAGNOSIS — I10 ESSENTIAL HYPERTENSION: ICD-10-CM

## 2024-03-05 DIAGNOSIS — E61.1 IRON DEFICIENCY: ICD-10-CM

## 2024-03-05 DIAGNOSIS — K70.30 ALCOHOLIC CIRRHOSIS, UNSPECIFIED WHETHER ASCITES PRESENT (H): ICD-10-CM

## 2024-03-05 DIAGNOSIS — Z00.00 ROUTINE GENERAL MEDICAL EXAMINATION AT A HEALTH CARE FACILITY: Primary | ICD-10-CM

## 2024-03-05 DIAGNOSIS — K76.6 PORTAL HYPERTENSION (H): ICD-10-CM

## 2024-03-05 LAB
ERYTHROCYTE [DISTWIDTH] IN BLOOD BY AUTOMATED COUNT: 13 % (ref 10–15)
HCT VFR BLD AUTO: 47.3 % (ref 40–53)
HGB BLD-MCNC: 16.7 G/DL (ref 13.3–17.7)
MCH RBC QN AUTO: 32.1 PG (ref 26.5–33)
MCHC RBC AUTO-ENTMCNC: 35.3 G/DL (ref 31.5–36.5)
MCV RBC AUTO: 91 FL (ref 78–100)
PLATELET # BLD AUTO: 109 10E3/UL (ref 150–450)
RBC # BLD AUTO: 5.21 10E6/UL (ref 4.4–5.9)
WBC # BLD AUTO: 6 10E3/UL (ref 4–11)

## 2024-03-05 PROCEDURE — 85027 COMPLETE CBC AUTOMATED: CPT | Performed by: PHYSICIAN ASSISTANT

## 2024-03-05 PROCEDURE — 83540 ASSAY OF IRON: CPT | Performed by: PHYSICIAN ASSISTANT

## 2024-03-05 PROCEDURE — 80061 LIPID PANEL: CPT | Performed by: PHYSICIAN ASSISTANT

## 2024-03-05 PROCEDURE — 36415 COLL VENOUS BLD VENIPUNCTURE: CPT | Performed by: PHYSICIAN ASSISTANT

## 2024-03-05 PROCEDURE — 80048 BASIC METABOLIC PNL TOTAL CA: CPT | Performed by: PHYSICIAN ASSISTANT

## 2024-03-05 PROCEDURE — 82728 ASSAY OF FERRITIN: CPT | Performed by: PHYSICIAN ASSISTANT

## 2024-03-05 PROCEDURE — 99396 PREV VISIT EST AGE 40-64: CPT | Performed by: PHYSICIAN ASSISTANT

## 2024-03-05 PROCEDURE — 83550 IRON BINDING TEST: CPT | Performed by: PHYSICIAN ASSISTANT

## 2024-03-05 PROCEDURE — 82570 ASSAY OF URINE CREATININE: CPT | Performed by: PHYSICIAN ASSISTANT

## 2024-03-05 PROCEDURE — 82043 UR ALBUMIN QUANTITATIVE: CPT | Performed by: PHYSICIAN ASSISTANT

## 2024-03-05 RX ORDER — FEXOFENADINE HCL 180 MG/1
180 TABLET ORAL DAILY
COMMUNITY
Start: 2024-03-05

## 2024-03-05 ASSESSMENT — PAIN SCALES - GENERAL: PAINLEVEL: NO PAIN (0)

## 2024-03-05 NOTE — PATIENT INSTRUCTIONS
Preventive Care Advice   This is general advice given by our system to help you stay healthy. However, your care team may have specific advice just for you. Please talk to your care team about your preventive care needs.  Nutrition  Eat 5 or more servings of fruits and vegetables each day.  Try wheat bread, brown rice and whole grain pasta (instead of white bread, rice, and pasta).  Get enough calcium and vitamin D. Check the label on foods and aim for 100% of the RDA (recommended daily allowance).  Lifestyle  Exercise at least 150 minutes each week   (30 minutes a day, 5 days a week).  Do muscle strengthening activities 2 days a week. These help control your weight and prevent disease.  No smoking.  Wear sunscreen to prevent skin cancer.  Have a dental exam and cleaning every 6 months.  Yearly exams  See your health care team every year to talk about:  Any changes in your health.  Any medicines your care team has prescribed.  Preventive care, family planning, and ways to prevent chronic diseases.  Shots (vaccines)   HPV shots (up to age 26), if you've never had them before.  Hepatitis B shots (up to age 59), if you've never had them before.  COVID-19 shot: Get this shot when it's due.  Flu shot: Get a flu shot every year.  Tetanus shot: Get a tetanus shot every 10 years.  Pneumococcal, hepatitis A, and RSV shots: Ask your care team if you need these based on your risk.  Shingles shot (for age 50 and up).  General health tests  Diabetes screening:  Starting at age 35, Get screened for diabetes at least every 3 years.  If you are younger than age 35, ask your care team if you should be screened for diabetes.  Cholesterol test: At age 39, start having a cholesterol test every 5 years, or more often if advised.  Bone density scan (DEXA): At age 50, ask your care team if you should have this scan for osteoporosis (brittle bones).  Hepatitis C: Get tested at least once in your life.  STIs (sexually transmitted  infections)  Before age 24: Ask your care team if you should be screened for STIs.  After age 24: Get screened for STIs if you're at risk. You are at risk for STIs (including HIV) if:  You are sexually active with more than one person.  You don't use condoms every time.  You or a partner was diagnosed with a sexually transmitted infection.  If you are at risk for HIV, ask about PrEP medicine to prevent HIV.  Get tested for HIV at least once in your life, whether you are at risk for HIV or not.  Cancer screening tests  Cervical cancer screening: If you have a cervix, begin getting regular cervical cancer screening tests at age 21. Most people who have regular screenings with normal results can stop after age 65. Talk about this with your provider.  Breast cancer scan (mammogram): If you've ever had breasts, begin having regular mammograms starting at age 40. This is a scan to check for breast cancer.  Colon cancer screening: It is important to start screening for colon cancer at age 45.  Have a colonoscopy test every 10 years (or more often if you're at risk) Or, ask your provider about stool tests like a FIT test every year or Cologuard test every 3 years.  To learn more about your testing options, visit: https://www.[email or web address] [email or web address].pdf.  For help making a decision, visit: https://bit.ly/gf87325.  Prostate cancer screening test: If you have a prostate and are age 55 to 69, ask your provider if you would benefit from a yearly prostate cancer screening test.  Lung cancer screening: If you are a current or former smoker age 50 to 80, ask your care team if ongoing lung cancer screenings are right for you.  For informational purposes only. Not to replace the advice of your health care provider. Copyright   2023 [employer]. All rights reserved. Clinically reviewed by the [facility] Transitions Program. [employer] [id number] - REV 01/24.

## 2024-03-05 NOTE — PROGRESS NOTES
"Preventive Care Visit  [facility] ROSEMARIE Vora PA-C, Family Medicine  Mar 5, 2024    Assessment & Plan     Routine general medical examination at a health care facility  Reviewed personal and family history. Reviewed age appropriate screenings. Recommended any needed vaccinations. Declines  - Lipid panel reflex to direct LDL Non-fasting; Future    Essential hypertension  Controlled now OFF medications  - Basic metabolic panel  (Ca, Cl, CO2, Creat, Gluc, K, Na, BUN); Future  - Albumin Random Urine Quantitative with Creat Ratio; Future    Alcoholic cirrhosis, unspecified whether ascites present (H)  Portal hypertension (H)  He has remained ETOH free and follows with GI. Most recent MRI in Fall 2023 and stable; denies any bleeding, swelling, etc.     Iron deficiency  Updating. He is using iron supplement  - CBC with platelets; Future  - Iron & Iron Binding Capacity; Future  - Ferritin; Future    Screening for colon cancer  DUE in May  - Colonoscopy Screening  Referral; Future      BMI  Estimated body mass index is 31.58 kg/m  as calculated from the following:    Height as of this encounter: 1.72 m (5' 7.72\").    Weight as of this encounter: 93.4 kg (206 lb).       Counseling  Appropriate preventive services were discussed with this patient, including applicable screening as appropriate for fall prevention, nutrition, physical activity, Tobacco-use cessation, weight loss and cognition.  Checklist reviewing preventive services available has been given to the patient.  Reviewed patient's diet, addressing concerns and/or questions.       Subjective   Christiano is a 63 year old, presenting for the following:  Physical        3/5/2024     1:22 PM   Additional Questions   Roomed by Nicky BENTLEY MA   Accompanied by Self         3/5/2024     1:22 PM   Patient Reported Additional Medications   Patient reports taking the following new medications None        Health Care Directive  Patient does " not have a Health Care Directive or Living Will: Discussed advance care planning with patient; however, patient declined at this time.    HPI    General Health   How would you rate your overall physical health? Excellent   Feel stress (tense, anxious, or unable to sleep) Not at all         3/3/2024   Nutrition   Three or more servings of calcium each day? Yes   Diet: Regular (no restrictions)   How many servings of fruit and vegetables per day? (!) 2-3   How many sweetened beverages each day? 0-1         3/3/2024   Exercise   Days per week of moderate/strenous exercise 5 days   Average minutes spent exercising at this level 60 min         3/3/2024   Social Factors   Frequency of gathering with friends or relatives Patient declined   Worry food won't last until get money to buy more No   Food not last or not have enough money for food? No   Do you have housing?  Yes   Are you worried about losing your housing? No   Lack of transportation? No   Unable to get utilities (heat,electricity)? No         3/3/2024   Fall Risk   Fallen 2 or more times in the past year? No   Trouble with walking or balance? No          3/3/2024   Dental   Dentist two times every year? Yes         3/3/2024   TB Screening   Were you born outside of [place]?  No         Today's PHQ-2 Score:       3/5/2024     1:03 PM   PHQ-2 ( 1999 Pfizer)   Q1: Little interest or pleasure in doing things 0   Q2: Feeling down, depressed or hopeless 0   PHQ-2 Score 0   Q1: Little interest or pleasure in doing things Not at all   Q2: Feeling down, depressed or hopeless Not at all   PHQ-2 Score 0           3/3/2024   Substance Use   Alcohol more than 3/day or more than 7/wk Not Applicable   Do you use any other substances recreationally? No     Social History     Tobacco Use    Smoking status: Never    Smokeless tobacco: Former   Vaping Use    Vaping Use: Never used   Substance Use Topics    Alcohol use: Yes     Comment: Monthly on special occasions    Drug use: No  "          3/3/2024   STI Screening   New sexual partner(s) since last STI/HIV test? No   Last PSA:   PSA   Date Value Ref Range Status   05/28/2021 1.48 0 - 4 ug/L Final     Comment:     Assay Method:  Chemiluminescence using Siemens Vista analyzer     Prostate Specific Antigen Screen   Date Value Ref Range Status   01/03/2023 0.98 0.00 - 4.50 ng/mL Final     ASCVD Risk   The 10-year ASCVD risk score (Isaac AGARWAL, et al., 2019) is: 7.9%    Values used to calculate the score:      Age: 63 years      Sex: Male      Is Non- : No      Diabetic: No      Tobacco smoker: No      Systolic Blood Pressure: 131 mmHg      Is BP treated: No      HDL Cholesterol: 69 mg/dL      Total Cholesterol: 162 mg/dL           Reviewed and updated as needed this visit by Provider                    Lab work is in process  Labs reviewed in EPIC      Review of Systems  Constitutional, HEENT, cardiovascular, pulmonary, gi and gu systems are negative, except as otherwise noted.     Objective    Exam  /79 (BP Location: Right arm, Patient Position: Sitting, Cuff Size: Adult Large)   Pulse 75   Temp 97.6  F (36.4  C) (Oral)   Resp 23   Ht 1.72 m (5' 7.72\")   Wt 93.4 kg (206 lb)   SpO2 96%   BMI 31.58 kg/m     Estimated body mass index is 31.58 kg/m  as calculated from the following:    Height as of this encounter: 1.72 m (5' 7.72\").    Weight as of this encounter: 93.4 kg (206 lb).    Physical Exam  GENERAL: alert and no distress  EYES: Eyes grossly normal to inspection, PERRL and conjunctivae and sclerae normal  HENT: ear canals and TM's normal, nose and mouth without ulcers or lesions  NECK: no adenopathy, no asymmetry, masses, or scars  RESP: lungs clear to auscultation - no rales, rhonchi or wheezes  CV: regular rate and rhythm, normal S1 S2, no S3 or S4, no murmur, click or rub, no peripheral edema  ABDOMEN: soft, nontender, without hepatosplenomegaly or masses, bowel sounds normal; no ascites  MS: " No peripheral edema   SKIN: no suspicious lesions or rashes  PSYCH: mentation appears normal, affect normal/bright      Signed Electronically by: Humberto Vora PA-C

## 2024-03-06 LAB
ANION GAP SERPL CALCULATED.3IONS-SCNC: 11 MMOL/L (ref 7–15)
BUN SERPL-MCNC: 13.4 MG/DL (ref 8–23)
CALCIUM SERPL-MCNC: 10 MG/DL (ref 8.8–10.2)
CHLORIDE SERPL-SCNC: 106 MMOL/L (ref 98–107)
CHOLEST SERPL-MCNC: 189 MG/DL
CREAT SERPL-MCNC: 0.82 MG/DL (ref 0.67–1.17)
CREAT UR-MCNC: 81.3 MG/DL
DEPRECATED HCO3 PLAS-SCNC: 23 MMOL/L (ref 22–29)
EGFRCR SERPLBLD CKD-EPI 2021: >90 ML/MIN/1.73M2
FASTING STATUS PATIENT QL REPORTED: NO
FERRITIN SERPL-MCNC: 314 NG/ML (ref 31–409)
GLUCOSE SERPL-MCNC: 94 MG/DL (ref 70–99)
HDLC SERPL-MCNC: 77 MG/DL
IRON BINDING CAPACITY (ROCHE): 313 UG/DL (ref 240–430)
IRON SATN MFR SERPL: 24 % (ref 15–46)
IRON SERPL-MCNC: 76 UG/DL (ref 61–157)
LDLC SERPL CALC-MCNC: 98 MG/DL
MICROALBUMIN UR-MCNC: <12 MG/L
MICROALBUMIN/CREAT UR: NORMAL MG/G{CREAT}
NONHDLC SERPL-MCNC: 112 MG/DL
POTASSIUM SERPL-SCNC: 4.7 MMOL/L (ref 3.4–5.3)
SODIUM SERPL-SCNC: 140 MMOL/L (ref 135–145)
TRIGL SERPL-MCNC: 68 MG/DL

## 2024-07-23 ENCOUNTER — MYC MEDICAL ADVICE (OUTPATIENT)
Dept: FAMILY MEDICINE | Facility: CLINIC | Age: 64
End: 2024-07-23
Payer: COMMERCIAL

## 2024-10-03 ENCOUNTER — HOSPITAL ENCOUNTER (OUTPATIENT)
Dept: MRI IMAGING | Facility: CLINIC | Age: 64
Discharge: HOME | End: 2024-10-03
Attending: INTERNAL MEDICINE | Admitting: INTERNAL MEDICINE
Payer: COMMERCIAL

## 2024-10-03 DIAGNOSIS — K70.9 ALCOHOLIC LIVER DISEASE (H): ICD-10-CM

## 2024-10-03 PROCEDURE — A9585 GADOBUTROL INJECTION: HCPCS | Performed by: INTERNAL MEDICINE

## 2024-10-03 PROCEDURE — 255N000002 HC RX 255 OP 636: Performed by: INTERNAL MEDICINE

## 2024-10-03 PROCEDURE — 74183 MRI ABD W/O CNTR FLWD CNTR: CPT

## 2024-10-03 RX ORDER — GADOBUTROL 604.72 MG/ML
9 INJECTION INTRAVENOUS ONCE
Status: COMPLETED | OUTPATIENT
Start: 2024-10-03 | End: 2024-10-03

## 2024-10-03 RX ADMIN — GADOBUTROL 9 ML: 604.72 INJECTION INTRAVENOUS at 07:18

## 2024-10-05 ENCOUNTER — TRANSFERRED RECORDS (OUTPATIENT)
Dept: HEALTH INFORMATION MANAGEMENT | Facility: CLINIC | Age: 64
End: 2024-10-05
Payer: COMMERCIAL

## 2025-02-03 ENCOUNTER — PATIENT OUTREACH (OUTPATIENT)
Dept: CARE COORDINATION | Facility: CLINIC | Age: 65
End: 2025-02-03
Payer: COMMERCIAL

## 2025-03-06 ENCOUNTER — OFFICE VISIT (OUTPATIENT)
Dept: FAMILY MEDICINE | Facility: CLINIC | Age: 65
End: 2025-03-06
Attending: PHYSICIAN ASSISTANT
Payer: COMMERCIAL

## 2025-03-06 VITALS
DIASTOLIC BLOOD PRESSURE: 90 MMHG | OXYGEN SATURATION: 97 % | HEART RATE: 100 BPM | SYSTOLIC BLOOD PRESSURE: 152 MMHG | RESPIRATION RATE: 16 BRPM | WEIGHT: 182 LBS | TEMPERATURE: 97.6 F | HEIGHT: 68 IN | BODY MASS INDEX: 27.58 KG/M2

## 2025-03-06 DIAGNOSIS — K70.30 ALCOHOLIC CIRRHOSIS, UNSPECIFIED WHETHER ASCITES PRESENT (H): ICD-10-CM

## 2025-03-06 DIAGNOSIS — Z12.11 SCREEN FOR COLON CANCER: ICD-10-CM

## 2025-03-06 DIAGNOSIS — I10 ESSENTIAL HYPERTENSION: ICD-10-CM

## 2025-03-06 DIAGNOSIS — Z12.5 SCREENING FOR PROSTATE CANCER: ICD-10-CM

## 2025-03-06 DIAGNOSIS — Z00.00 ROUTINE GENERAL MEDICAL EXAMINATION AT A HEALTH CARE FACILITY: Primary | ICD-10-CM

## 2025-03-06 PROBLEM — I85.10 SECONDARY ESOPHAGEAL VARICES WITHOUT BLEEDING (H): Status: RESOLVED | Noted: 2022-09-19 | Resolved: 2025-03-06

## 2025-03-06 PROBLEM — K76.6 PORTAL HYPERTENSION (H): Status: RESOLVED | Noted: 2022-09-19 | Resolved: 2025-03-06

## 2025-03-06 SDOH — HEALTH STABILITY: PHYSICAL HEALTH
ON AVERAGE, HOW MANY DAYS PER WEEK DO YOU ENGAGE IN MODERATE TO STRENUOUS EXERCISE (LIKE A BRISK WALK)?: PATIENT DECLINED

## 2025-03-06 SDOH — HEALTH STABILITY: PHYSICAL HEALTH: ON AVERAGE, HOW MANY MINUTES DO YOU ENGAGE IN EXERCISE AT THIS LEVEL?: PATIENT DECLINED

## 2025-03-06 ASSESSMENT — SOCIAL DETERMINANTS OF HEALTH (SDOH): HOW OFTEN DO YOU GET TOGETHER WITH FRIENDS OR RELATIVES?: MORE THAN THREE TIMES A WEEK

## 2025-03-06 ASSESSMENT — PAIN SCALES - GENERAL: PAINLEVEL_OUTOF10: NO PAIN (0)

## 2025-03-06 NOTE — PROGRESS NOTES
"Preventive Care Visit  [facility] ROSEMARIE Vora PA-C, Family Medicine  Mar 6, 2025      Assessment & Plan     Routine general medical examination at a health care facility  Reviewed personal and family history. Reviewed age appropriate screenings. Recommended any needed vaccinations.  - Lipid panel reflex to direct LDL Fasting; Future    Essential hypertension  Uncontrolled. Unclear if just a one off or now BP needing medication once again. This is somewhat surprising given his weight loss and improved diet. Recheck with nurse only visit within the next wee or two; if still elevated plan to add back rx    Alcoholic cirrhosis, unspecified whether ascites present (H)  Continues with GI; getting regular imaging. Strongly recommend complete sobriety  - Comprehensive metabolic panel (BMP + Alb, Alk Phos, ALT, AST, Total. Bili, TP); Future  - CBC with platelets; Future  - Iron & Iron Binding Capacity; Future  - Ferritin; Future    Screening for prostate cancer  - PSA, screen; Future    Screen for colon cancer  - Colonoscopy Screening  Referral; Future    The longitudinal plan of care for the diagnosis(es)/condition(s) as documented were addressed during this visit. Due to the added complexity in care, I will continue to support Christiano in the subsequent management and with ongoing continuity of care.        BMI  Estimated body mass index is 27.9 kg/m  as calculated from the following:    Height as of this encounter: 1.72 m (5' 7.72\").    Weight as of this encounter: 82.6 kg (182 lb).       Counseling  Appropriate preventive services were addressed with this patient via screening, questionnaire, or discussion as appropriate for fall prevention, nutrition, physical activity, Tobacco-use cessation, social engagement, weight loss and cognition.  Checklist reviewing preventive services available has been given to the patient.  Reviewed patient's diet, addressing concerns and/or questions. "           Boubacar Alvarado is a 64 year old, presenting for the following:  Physical (Not fasting)        3/6/2025     2:55 PM   Additional Questions   Roomed by echo          HPI    Yakov Kahn is a 64 year old male who presents today for annual check up  Continues with GI to monitor his cirrhotic liver  No longer on medications, MRI have been stable  He has avoided ETOH completely for the past year but incidentally had a beer at lunch today with his borther for the first time.     Advance Care Planning  Patient does not have a Health Care Directive: Discussed advance care planning with patient; however, patient declined at this time.      3/6/2025   General Health   How would you rate your overall physical health? Excellent   Feel stress (tense, anxious, or unable to sleep) Patient declined         3/6/2025   Nutrition   Three or more servings of calcium each day? Yes   Diet: Regular (no restrictions)   How many servings of fruit and vegetables per day? (!) 2-3   How many sweetened beverages each day? 0-1         3/6/2025   Exercise   Days per week of moderate/strenous exercise Patient declined   Average minutes spent exercising at this level Patient declined         3/6/2025   Social Factors   Frequency of gathering with friends or relatives More than three times a week   Worry food won't last until get money to buy more Patient declined   Food not last or not have enough money for food? Patient declined   Do you have housing? (Housing is defined as stable permanent housing and does not include staying ouside in a car, in a tent, in an abandoned building, in an overnight shelter, or couch-surfing.) Patient declined   Are you worried about losing your housing? Patient declined   Lack of transportation? Patient declined   Unable to get utilities (heat,electricity)? Patient declined         3/6/2025   Fall Risk   Fallen 2 or more times in the past year? No   Trouble with walking or balance? No          3/6/2025    Dental   Dentist two times every year? (!) DECLINE         3/3/2024   TB Screening   Were you born outside of the [place]? No         Today's PHQ-2 Score:       3/6/2025     2:45 PM   PHQ-2 ( 1999 [employer])   Q1: Little interest or pleasure in doing things 0   Q2: Feeling down, depressed or hopeless 0   PHQ-2 Score 0    Q1: Little interest or pleasure in doing things Not at all   Q2: Feeling down, depressed or hopeless Not at all   PHQ-2 Score 0       Patient-reported           3/6/2025   Substance Use   Alcohol more than 3/day or more than 7/wk Not Applicable   Do you use any other substances recreationally? (!) DECLINE     Social History     Tobacco Use    Smoking status: Never    Smokeless tobacco: Former   Vaping Use    Vaping status: Never Used   Substance Use Topics    Alcohol use: Yes     Comment: Monthly on special occasions    Drug use: No           3/6/2025   STI Screening   New sexual partner(s) since last STI/HIV test? (!) DECLINE   Last PSA:   PSA   Date Value Ref Range Status   05/28/2021 1.48 0 - 4 ug/L Final     Comment:     Assay Method:  Chemiluminescence using Siemens Vista analyzer     Prostate Specific Antigen Screen   Date Value Ref Range Status   01/03/2023 0.98 0.00 - 4.50 ng/mL Final     ASCVD Risk   The 10-year ASCVD risk score (Isaac AGARWAL, et al., 2019) is: 11.9%    Values used to calculate the score:      Age: 64 years      Sex: Male      Is Non- : No      Diabetic: No      Tobacco smoker: No      Systolic Blood Pressure: 154 mmHg      Is BP treated: No      HDL Cholesterol: 77 mg/dL      Total Cholesterol: 189 mg/dL         Reviewed and updated as needed this visit by Provider                    Lab work is in process  Labs reviewed in EPIC      Review of Systems  Constitutional, HEENT, cardiovascular, pulmonary, gi and gu systems are negative, except as otherwise noted.     Objective    Exam  BP (!) 154/96   Pulse 100   Temp 97.6  F (36.4  C)   Resp 16    "Ht 1.72 m (5' 7.72\")   Wt 82.6 kg (182 lb)   SpO2 97%   BMI 27.90 kg/m     Estimated body mass index is 27.9 kg/m  as calculated from the following:    Height as of this encounter: 1.72 m (5' 7.72\").    Weight as of this encounter: 82.6 kg (182 lb).    Physical Exam  GENERAL: alert and no distress  EYES: Eyes grossly normal to inspection, PERRL and conjunctivae and sclerae normal  HENT: ear canals and TM's normal, nose and mouth without ulcers or lesions  NECK: no adenopathy, no asymmetry, masses, or scars  RESP: lungs clear to auscultation - no rales, rhonchi or wheezes  CV: regular rate and rhythm, normal S1 S2, no S3 or S4, no murmur, click or rub, no peripheral edema  ABDOMEN: soft, nontender, no hepatosplenomegaly, no masses and bowel sounds normal  MS: No peripheral edema   SKIN: no suspicious lesions or rashes  PSYCH: mentation appears normal, affect normal/bright        Signed Electronically by: Humberto Vora PA-C    "

## 2025-03-06 NOTE — PATIENT INSTRUCTIONS
Patient Education   Preventive Care Advice   This is general advice given by our system to help you stay healthy. However, your care team may have specific advice just for you. Please talk to your care team about your preventive care needs.  Nutrition  Eat 5 or more servings of fruits and vegetables each day.  Try wheat bread, brown rice and whole grain pasta (instead of white bread, rice, and pasta).  Get enough calcium and vitamin D. Check the label on foods and aim for 100% of the RDA (recommended daily allowance).  Lifestyle  Exercise at least 150 minutes each week  (30 minutes a day, 5 days a week).  Do muscle strengthening activities 2 days a week. These help control your weight and prevent disease.  No smoking.  Wear sunscreen to prevent skin cancer.  Have a dental exam and cleaning every 6 months.  Yearly exams  See your health care team every year to talk about:  Any changes in your health.  Any medicines your care team has prescribed.  Preventive care, family planning, and ways to prevent chronic diseases.  Shots (vaccines)   HPV shots (up to age 26), if you've never had them before.  Hepatitis B shots (up to age 59), if you've never had them before.  COVID-19 shot: Get this shot when it's due.  Flu shot: Get a flu shot every year.  Tetanus shot: Get a tetanus shot every 10 years.  Pneumococcal, hepatitis A, and RSV shots: Ask your care team if you need these based on your risk.  Shingles shot (for age 50 and up)  General health tests  Diabetes screening:  Starting at age 35, Get screened for diabetes at least every 3 years.  If you are younger than age 35, ask your care team if you should be screened for diabetes.  Cholesterol test: At age 39, start having a cholesterol test every 5 years, or more often if advised.  Bone density scan (DEXA): At age 50, ask your care team if you should have this scan for osteoporosis (brittle bones).  Hepatitis C: Get tested at least once in your life.  STIs (sexually  transmitted infections)  Before age 24: Ask your care team if you should be screened for STIs.  After age 24: Get screened for STIs if you're at risk. You are at risk for STIs (including HIV) if:  You are sexually active with more than one person.  You don't use condoms every time.  You or a partner was diagnosed with a sexually transmitted infection.  If you are at risk for HIV, ask about PrEP medicine to prevent HIV.  Get tested for HIV at least once in your life, whether you are at risk for HIV or not.  Cancer screening tests  Cervical cancer screening: If you have a cervix, begin getting regular cervical cancer screening tests starting at age 21.  Breast cancer scan (mammogram): If you've ever had breasts, begin having regular mammograms starting at age 40. This is a scan to check for breast cancer.  Colon cancer screening: It is important to start screening for colon cancer at age 45.  Have a colonoscopy test every 10 years (or more often if you're at risk) Or, ask your provider about stool tests like a FIT test every year or Cologuard test every 3 years.  To learn more about your testing options, visit:   .  For help making a decision, visit:   https://bit.ly/ai12393.  Prostate cancer screening test: If you have a prostate, ask your care team if a prostate cancer screening test (PSA) at age 55 is right for you.  Lung cancer screening: If you are a current or former smoker ages 50 to 80, ask your care team if ongoing lung cancer screenings are right for you.  For informational purposes only. Not to replace the advice of your health care provider. Copyright   2023 [employer]. All rights reserved. Clinically reviewed by the [facility] Transitions Program. [employer] [id number] - REV 01/24.

## 2025-03-13 ENCOUNTER — TELEPHONE (OUTPATIENT)
Dept: FAMILY MEDICINE | Facility: CLINIC | Age: 65
End: 2025-03-13

## 2025-03-13 ENCOUNTER — LAB (OUTPATIENT)
Dept: LAB | Facility: CLINIC | Age: 65
End: 2025-03-13
Payer: COMMERCIAL

## 2025-03-13 ENCOUNTER — ALLIED HEALTH/NURSE VISIT (OUTPATIENT)
Dept: FAMILY MEDICINE | Facility: CLINIC | Age: 65
End: 2025-03-13
Payer: COMMERCIAL

## 2025-03-13 VITALS
HEART RATE: 74 BPM | OXYGEN SATURATION: 98 % | SYSTOLIC BLOOD PRESSURE: 145 MMHG | DIASTOLIC BLOOD PRESSURE: 92 MMHG | RESPIRATION RATE: 16 BRPM

## 2025-03-13 DIAGNOSIS — K70.30 ALCOHOLIC CIRRHOSIS, UNSPECIFIED WHETHER ASCITES PRESENT (H): ICD-10-CM

## 2025-03-13 DIAGNOSIS — I10 ESSENTIAL HYPERTENSION: Primary | ICD-10-CM

## 2025-03-13 DIAGNOSIS — Z01.30 BP CHECK: Primary | ICD-10-CM

## 2025-03-13 DIAGNOSIS — Z00.00 ROUTINE GENERAL MEDICAL EXAMINATION AT A HEALTH CARE FACILITY: ICD-10-CM

## 2025-03-13 DIAGNOSIS — Z12.5 SCREENING FOR PROSTATE CANCER: ICD-10-CM

## 2025-03-13 LAB
ERYTHROCYTE [DISTWIDTH] IN BLOOD BY AUTOMATED COUNT: 12.6 % (ref 10–15)
HCT VFR BLD AUTO: 47.9 % (ref 40–53)
HGB BLD-MCNC: 16.5 G/DL (ref 13.3–17.7)
MCH RBC QN AUTO: 32 PG (ref 26.5–33)
MCHC RBC AUTO-ENTMCNC: 34.4 G/DL (ref 31.5–36.5)
MCV RBC AUTO: 93 FL (ref 78–100)
PLATELET # BLD AUTO: 99 10E3/UL (ref 150–450)
RBC # BLD AUTO: 5.16 10E6/UL (ref 4.4–5.9)
WBC # BLD AUTO: 4.9 10E3/UL (ref 4–11)

## 2025-03-13 RX ORDER — OLMESARTAN MEDOXOMIL 5 MG/1
5 TABLET ORAL DAILY
Qty: 90 TABLET | Refills: 0 | Status: SHIPPED | OUTPATIENT
Start: 2025-03-13

## 2025-03-13 NOTE — TELEPHONE ENCOUNTER
Yakov Kahn is a 64 year old patient who comes in today for a Blood Pressure check.  Initial BP:  BP (!) 153/89   Pulse 78   Resp 16   SpO2 98%      Second BP: 145/92  Does not take BP at home. Is not on any HTN medication. Was on lisinopril, did not like how tired it made him. Is amendable to starting a different medication.  Disposition: follow-up as previously indicated by provider and provider notified while patient in the clinic.

## 2025-03-13 NOTE — PROGRESS NOTES
Yakov Kahn is a 64 year old patient who comes in today for a Blood Pressure check.  Initial BP:  BP (!) 153/89   Pulse 78   Resp 16   SpO2 98%      Second BP: 145/92  Does not take BP at home. Is not on any HTN medication. Was on lisinopril, did not like how tired it made him.   Disposition: follow-up as previously indicated by provider and provider notified while patient in the clinic.    Tara Spring CMA

## 2025-03-24 ENCOUNTER — ANCILLARY ORDERS (OUTPATIENT)
Dept: ULTRASOUND IMAGING | Facility: CLINIC | Age: 65
End: 2025-03-24
Payer: COMMERCIAL

## 2025-03-24 DIAGNOSIS — K76.9 CHRONIC LIVER DISEASE: Primary | ICD-10-CM

## 2025-04-03 ENCOUNTER — HOSPITAL ENCOUNTER (OUTPATIENT)
Dept: ULTRASOUND IMAGING | Facility: CLINIC | Age: 65
Discharge: HOME | End: 2025-04-03
Attending: INTERNAL MEDICINE
Payer: COMMERCIAL

## 2025-04-03 ENCOUNTER — TELEPHONE (OUTPATIENT)
Dept: FAMILY MEDICINE | Facility: CLINIC | Age: 65
End: 2025-04-03
Payer: COMMERCIAL

## 2025-04-03 DIAGNOSIS — K76.9 CHRONIC LIVER DISEASE: ICD-10-CM

## 2025-04-03 PROCEDURE — 76705 ECHO EXAM OF ABDOMEN: CPT

## 2025-04-03 NOTE — TELEPHONE ENCOUNTER
Patient Quality Outreach    Patient is due for the following:   Hypertension -  BP check    Action(s) Taken:   Schedule a nurse only visit for bp check    Type of outreach:    Sent [facility] message.    Questions for provider review:    None         Maru Spring  Chart routed to None.

## (undated) DEVICE — ENDO SNARE POLYPECTOMY OVAL 15MM LOOP SD-240U-15

## (undated) DEVICE — KIT ENDO TURNOVER/PROCEDURE W/CLEAN A SCOPE LINERS 103888

## (undated) DEVICE — ENDO LIGATOR UNIV 6 BAND G31917 MBL-U-6

## (undated) DEVICE — ENDO BITE BLOCK ADULT OLYMPUS LATEX FREE MAJ-1632

## (undated) DEVICE — ENDO TRAP POLYP QUICK CATCH 710201

## (undated) RX ORDER — FENTANYL CITRATE 50 UG/ML
INJECTION, SOLUTION INTRAMUSCULAR; INTRAVENOUS
Status: DISPENSED
Start: 2021-05-18

## (undated) RX ORDER — DIPHENHYDRAMINE HYDROCHLORIDE 50 MG/ML
INJECTION INTRAMUSCULAR; INTRAVENOUS
Status: DISPENSED
Start: 2022-09-15

## (undated) RX ORDER — FENTANYL CITRATE 0.05 MG/ML
INJECTION, SOLUTION INTRAMUSCULAR; INTRAVENOUS
Status: DISPENSED
Start: 2022-09-19

## (undated) RX ORDER — FENTANYL CITRATE 0.05 MG/ML
INJECTION, SOLUTION INTRAMUSCULAR; INTRAVENOUS
Status: DISPENSED
Start: 2022-09-16

## (undated) RX ORDER — DIPHENHYDRAMINE HYDROCHLORIDE 50 MG/ML
INJECTION INTRAMUSCULAR; INTRAVENOUS
Status: DISPENSED
Start: 2022-09-19

## (undated) RX ORDER — SIMETHICONE 40MG/0.6ML
SUSPENSION, DROPS(FINAL DOSAGE FORM)(ML) ORAL
Status: DISPENSED
Start: 2021-05-18

## (undated) RX ORDER — FENTANYL CITRATE 0.05 MG/ML
INJECTION, SOLUTION INTRAMUSCULAR; INTRAVENOUS
Status: DISPENSED
Start: 2022-09-15

## (undated) RX ORDER — FENTANYL CITRATE 50 UG/ML
INJECTION, SOLUTION INTRAMUSCULAR; INTRAVENOUS
Status: DISPENSED
Start: 2021-05-17